# Patient Record
Sex: FEMALE | Race: BLACK OR AFRICAN AMERICAN | NOT HISPANIC OR LATINO | Employment: OTHER | URBAN - METROPOLITAN AREA
[De-identification: names, ages, dates, MRNs, and addresses within clinical notes are randomized per-mention and may not be internally consistent; named-entity substitution may affect disease eponyms.]

---

## 2017-05-04 ENCOUNTER — HISTORICAL (OUTPATIENT)
Dept: ADMINISTRATIVE | Facility: HOSPITAL | Age: 62
End: 2017-05-04

## 2017-05-04 LAB
ALBUMIN SERPL-MCNC: 3.5 GM/DL (ref 3.4–5)
ALBUMIN/GLOB SERPL: 1 {RATIO}
ALP SERPL-CCNC: 253 UNIT/L (ref 38–126)
ALT SERPL-CCNC: 489 UNIT/L (ref 12–78)
AST SERPL-CCNC: 189 UNIT/L (ref 15–37)
BILIRUB SERPL-MCNC: 0.7 MG/DL (ref 0.2–1)
BILIRUBIN DIRECT+TOT PNL SERPL-MCNC: 0.2 MG/DL (ref 0–0.2)
BILIRUBIN DIRECT+TOT PNL SERPL-MCNC: 0.5 MG/DL (ref 0–0.8)
BUN SERPL-MCNC: 18 MG/DL (ref 7–18)
CALCIUM SERPL-MCNC: 8.5 MG/DL (ref 8.5–10.1)
CHLORIDE SERPL-SCNC: 108 MMOL/L (ref 98–107)
CO2 SERPL-SCNC: 29 MMOL/L (ref 21–32)
CREAT SERPL-MCNC: 0.85 MG/DL (ref 0.55–1.02)
GLOBULIN SER-MCNC: 3.4 GM/DL (ref 2.4–3.5)
GLUCOSE SERPL-MCNC: 124 MG/DL (ref 74–106)
INR PPP: 1.01 (ref 0–1.27)
POTASSIUM SERPL-SCNC: 4.1 MMOL/L (ref 3.5–5.1)
PROT SERPL-MCNC: 6.9 GM/DL (ref 6.4–8.2)
PROTHROMBIN TIME: 13.1 SECOND(S) (ref 12.1–14.2)
SODIUM SERPL-SCNC: 145 MMOL/L (ref 136–145)

## 2017-09-25 ENCOUNTER — HISTORICAL (OUTPATIENT)
Dept: BARIATRICS | Facility: HOSPITAL | Age: 62
End: 2017-09-25

## 2018-06-21 ENCOUNTER — HISTORICAL (OUTPATIENT)
Dept: INTENSIVE CARE | Facility: HOSPITAL | Age: 63
End: 2018-06-21

## 2020-09-23 LAB — CRC RECOMMENDATION EXT: NORMAL

## 2021-02-23 LAB — BCS RECOMMENDATION EXT: NORMAL

## 2021-04-14 ENCOUNTER — HISTORICAL (OUTPATIENT)
Dept: ADMINISTRATIVE | Facility: HOSPITAL | Age: 66
End: 2021-04-14

## 2021-04-14 LAB
ABS NEUT (OLG): 3 X10(3)/MCL (ref 2.1–9.2)
ALBUMIN SERPL-MCNC: 4.2 GM/DL (ref 3.4–4.8)
ALBUMIN/GLOB SERPL: 1.4 RATIO (ref 1.1–2)
ALP SERPL-CCNC: 142 UNIT/L (ref 40–150)
ALT SERPL-CCNC: 199 UNIT/L (ref 0–55)
APPEARANCE, UA: CLEAR
AST SERPL-CCNC: 219 UNIT/L (ref 5–34)
BACTERIA SPEC CULT: ABNORMAL /HPF
BASOPHILS # BLD AUTO: 0 X10(3)/MCL (ref 0–0.2)
BASOPHILS NFR BLD AUTO: 0 %
BILIRUB SERPL-MCNC: 0.8 MG/DL
BILIRUB UR QL STRIP: NEGATIVE
BILIRUBIN DIRECT+TOT PNL SERPL-MCNC: 0.4 MG/DL (ref 0–0.5)
BILIRUBIN DIRECT+TOT PNL SERPL-MCNC: 0.4 MG/DL (ref 0–0.8)
BUN SERPL-MCNC: 17.3 MG/DL (ref 9.8–20.1)
CALCIUM SERPL-MCNC: 9.7 MG/DL (ref 8.4–10.2)
CHLORIDE SERPL-SCNC: 106 MMOL/L (ref 98–107)
CHOLEST SERPL-MCNC: 202 MG/DL
CHOLEST/HDLC SERPL: 2 {RATIO} (ref 0–5)
CK SERPL-CCNC: 152 U/L (ref 29–168)
CO2 SERPL-SCNC: 32 MMOL/L (ref 23–31)
COLOR UR: ABNORMAL
CREAT SERPL-MCNC: 0.97 MG/DL (ref 0.55–1.02)
EOSINOPHIL # BLD AUTO: 0.1 X10(3)/MCL (ref 0–0.9)
EOSINOPHIL NFR BLD AUTO: 2 %
ERYTHROCYTE [DISTWIDTH] IN BLOOD BY AUTOMATED COUNT: 15.1 % (ref 11.5–17)
EST. AVERAGE GLUCOSE BLD GHB EST-MCNC: 116.9 MG/DL
GLOBULIN SER-MCNC: 3.1 GM/DL (ref 2.4–3.5)
GLUCOSE (UA): NEGATIVE
GLUCOSE SERPL-MCNC: 101 MG/DL (ref 82–115)
HAV IGM SERPL QL IA: NONREACTIVE
HBA1C MFR BLD: 5.7 %
HBV CORE IGM SERPL QL IA: REACTIVE
HBV SURFACE AG SERPL QL IA: NONREACTIVE
HCT VFR BLD AUTO: 45.5 % (ref 37–47)
HCV AB SERPL QL IA: NONREACTIVE
HDLC SERPL-MCNC: 96 MG/DL (ref 35–60)
HEPATITIS PANEL INTERP: ABNORMAL
HGB BLD-MCNC: 14.3 GM/DL (ref 12–16)
HGB UR QL STRIP: NEGATIVE
KETONES UR QL STRIP: ABNORMAL
LDLC SERPL CALC-MCNC: 96 MG/DL (ref 50–140)
LEUKOCYTE ESTERASE UR QL STRIP: NEGATIVE
LYMPHOCYTES # BLD AUTO: 2.8 X10(3)/MCL (ref 0.6–4.6)
LYMPHOCYTES NFR BLD AUTO: 43 %
MCH RBC QN AUTO: 29.2 PG (ref 27–31)
MCHC RBC AUTO-ENTMCNC: 31.4 GM/DL (ref 33–36)
MCV RBC AUTO: 93 FL (ref 80–94)
MONOCYTES # BLD AUTO: 0.6 X10(3)/MCL (ref 0.1–1.3)
MONOCYTES NFR BLD AUTO: 9 %
NEUTROPHILS # BLD AUTO: 3 X10(3)/MCL (ref 2.1–9.2)
NEUTROPHILS NFR BLD AUTO: 46 %
NITRITE UR QL STRIP: NEGATIVE
PH UR STRIP: 5.5 [PH] (ref 5–9)
PLATELET # BLD AUTO: 249 X10(3)/MCL (ref 130–400)
PMV BLD AUTO: 10.1 FL (ref 9.4–12.4)
POTASSIUM SERPL-SCNC: 4.5 MMOL/L (ref 3.5–5.1)
PROT SERPL-MCNC: 7.3 GM/DL (ref 5.8–7.6)
PROT UR QL STRIP: NEGATIVE
RBC # BLD AUTO: 4.89 X10(6)/MCL (ref 4.2–5.4)
RBC #/AREA URNS HPF: ABNORMAL /[HPF]
SODIUM SERPL-SCNC: 145 MMOL/L (ref 136–145)
SP GR UR STRIP: 1.03 (ref 1–1.03)
SQUAMOUS EPITHELIAL, UA: ABNORMAL /HPF (ref 0–4)
TRIGL SERPL-MCNC: 52 MG/DL (ref 37–140)
TSH SERPL-ACNC: 1.03 UIU/ML (ref 0.35–4.94)
UROBILINOGEN UR STRIP-ACNC: 1
VLDLC SERPL CALC-MCNC: 10 MG/DL
WBC # SPEC AUTO: 6.5 X10(3)/MCL (ref 4.5–11.5)
WBC #/AREA URNS HPF: ABNORMAL /[HPF]

## 2021-04-21 ENCOUNTER — HISTORICAL (OUTPATIENT)
Dept: ADMINISTRATIVE | Facility: HOSPITAL | Age: 66
End: 2021-04-21

## 2021-04-30 ENCOUNTER — HISTORICAL (OUTPATIENT)
Dept: RADIOLOGY | Facility: HOSPITAL | Age: 66
End: 2021-04-30

## 2021-05-26 ENCOUNTER — NURSE TRIAGE (OUTPATIENT)
Dept: ADMINISTRATIVE | Facility: CLINIC | Age: 66
End: 2021-05-26

## 2021-06-02 ENCOUNTER — HISTORICAL (OUTPATIENT)
Dept: ADMINISTRATIVE | Facility: HOSPITAL | Age: 66
End: 2021-06-02

## 2021-06-02 LAB — SARS-COV-2 RNA RESP QL NAA+PROBE: NOT DETECTED

## 2022-04-09 ENCOUNTER — HISTORICAL (OUTPATIENT)
Dept: ADMINISTRATIVE | Facility: HOSPITAL | Age: 67
End: 2022-04-09

## 2022-04-25 VITALS
HEIGHT: 61 IN | BODY MASS INDEX: 39.63 KG/M2 | DIASTOLIC BLOOD PRESSURE: 76 MMHG | SYSTOLIC BLOOD PRESSURE: 128 MMHG | WEIGHT: 209.88 LBS | OXYGEN SATURATION: 96 %

## 2022-06-02 ENCOUNTER — TELEPHONE (OUTPATIENT)
Dept: FAMILY MEDICINE | Facility: CLINIC | Age: 67
End: 2022-06-02
Payer: MEDICARE

## 2022-06-02 NOTE — TELEPHONE ENCOUNTER
----- Message from Izzy Lu Patient Care Assistant sent at 6/2/2022 12:38 PM CDT -----  Regarding: med refill  Type:  RX Refill Request    Who Called: pt  Refill or New Rx: refill  RX Name and Strength: weight control pill not adapex it came in two strengths a 40 - 60 pt thinks  How is the patient currently taking it? (ex. 1XDay): 1 daily  Is this a 30 day or 90 day RX: 30  Preferred Pharmacy with phone number: [facility] or Mail Order: Local  Ordering Provider: Dr. Ulrich  Would the patient rather a call back or a response via [facility]? C/b  Best Call Back Number: [phone]  Additional Information: pt req refill on her weight control pill please call pt back to discuss

## 2022-06-13 ENCOUNTER — PATIENT MESSAGE (OUTPATIENT)
Dept: FAMILY MEDICINE | Facility: CLINIC | Age: 67
End: 2022-06-13

## 2022-06-23 DIAGNOSIS — F41.9 ANXIETY DISORDER, UNSPECIFIED: ICD-10-CM

## 2022-06-23 RX ORDER — BUSPIRONE HYDROCHLORIDE 10 MG/1
10 TABLET ORAL 2 TIMES DAILY
Qty: 180 TABLET | Refills: 1 | Status: SHIPPED | OUTPATIENT
Start: 2022-06-23 | End: 2022-06-28 | Stop reason: SDUPTHER

## 2022-06-23 RX ORDER — LEVOCETIRIZINE DIHYDROCHLORIDE 5 MG/1
5 TABLET, FILM COATED ORAL DAILY
Qty: 30 TABLET | Refills: 3 | Status: SHIPPED | OUTPATIENT
Start: 2022-06-23 | End: 2022-06-28 | Stop reason: SDUPTHER

## 2022-06-23 RX ORDER — ASPIRIN 325 MG
50000 TABLET, DELAYED RELEASE (ENTERIC COATED) ORAL
Qty: 4 CAPSULE | Refills: 3 | Status: SHIPPED | OUTPATIENT
Start: 2022-06-23 | End: 2022-06-28 | Stop reason: SDUPTHER

## 2022-06-28 DIAGNOSIS — F41.9 ANXIETY DISORDER, UNSPECIFIED: ICD-10-CM

## 2022-06-28 RX ORDER — LEVOCETIRIZINE DIHYDROCHLORIDE 5 MG/1
5 TABLET, FILM COATED ORAL DAILY
Qty: 90 TABLET | Refills: 3 | Status: SHIPPED | OUTPATIENT
Start: 2022-06-28 | End: 2022-10-10 | Stop reason: SDUPTHER

## 2022-06-28 RX ORDER — ASPIRIN 325 MG
50000 TABLET, DELAYED RELEASE (ENTERIC COATED) ORAL
Qty: 4 CAPSULE | Refills: 3 | Status: SHIPPED | OUTPATIENT
Start: 2022-06-28 | End: 2023-07-12 | Stop reason: SDUPTHER

## 2022-06-28 RX ORDER — BUSPIRONE HYDROCHLORIDE 10 MG/1
10 TABLET ORAL 2 TIMES DAILY
Qty: 180 TABLET | Refills: 1 | Status: SHIPPED | OUTPATIENT
Start: 2022-06-28 | End: 2022-07-05

## 2022-10-10 RX ORDER — LEVOCETIRIZINE DIHYDROCHLORIDE 5 MG/1
5 TABLET, FILM COATED ORAL DAILY
Qty: 90 TABLET | Refills: 3 | Status: SHIPPED | OUTPATIENT
Start: 2022-10-10 | End: 2023-06-22 | Stop reason: SDUPTHER

## 2022-10-26 ENCOUNTER — OFFICE VISIT (OUTPATIENT)
Dept: FAMILY MEDICINE | Facility: CLINIC | Age: 67
End: 2022-10-26
Payer: COMMERCIAL

## 2022-10-26 VITALS
DIASTOLIC BLOOD PRESSURE: 73 MMHG | OXYGEN SATURATION: 96 % | HEART RATE: 67 BPM | WEIGHT: 203.69 LBS | BODY MASS INDEX: 38.46 KG/M2 | TEMPERATURE: 98 F | HEIGHT: 61 IN | SYSTOLIC BLOOD PRESSURE: 117 MMHG | RESPIRATION RATE: 16 BRPM

## 2022-10-26 DIAGNOSIS — Z01.818 ENCOUNTER FOR OTHER PREPROCEDURAL EXAMINATION: Primary | ICD-10-CM

## 2022-10-26 PROCEDURE — 99213 OFFICE O/P EST LOW 20 MIN: CPT | Mod: ,,, | Performed by: PHYSICIAN ASSISTANT

## 2022-10-26 PROCEDURE — 99213 PR OFFICE/OUTPT VISIT, EST, LEVL III, 20-29 MIN: ICD-10-PCS | Mod: ,,, | Performed by: PHYSICIAN ASSISTANT

## 2022-10-26 RX ORDER — LIRAGLUTIDE 6 MG/ML
3 INJECTION, SOLUTION SUBCUTANEOUS
COMMUNITY
End: 2022-12-08

## 2022-10-26 NOTE — PROGRESS NOTES
SUBJECTIVE:     History of Present Illness      Chief Complaint: Surgery Clearance    HPI:  Patient is a 67 y.o. year old female who presents to clinic for surgical clearance. Patient will be having Cataracts surgery on 11/8/2022.  Patient has past medical history of generalized anxiety disorder, binge eating disorder, controlled hypertension, obesity,and insomnia.     Review of Systems:  General: Denies fever, chills, fatigue, myalgias, and change in appetite   Eyes: Denies eye redness, eye drainage, eye pain  ENT: Denies ear pain or pressure, rhinorrhea, nasal congestion, sore throat, and trouble swallowing  Resp: Denies wheezing, and shortness of breath   Cardio: Denies chest pain, palpitations, pleuritic pain, and edema   GI: Denies nausea, vomiting, diarrhea, and abdominal pain   : Denies dysuria, hematuria, and discharge   MSK: Denies trauma, joint pain, and trouble ambulating   Neuro: Denies LOC, dizziness, seizure like activity, and focal deficits   Skin: Denies rashes, open lesions and ulcers      Previous History      Review of patient's allergies indicates:  No Known Allergies    Past Medical History:   Diagnosis Date    Binge eating disorder     Generalized anxiety disorder     Hypertensive disorder     Insomnia     Obesity, unspecified     BENIGNO (obstructive sleep apnea)      Current Outpatient Medications   Medication Instructions    atorvastatin (LIPITOR) 20 mg, Oral, Daily    busPIRone (BUSPAR) 10 MG tablet TAKE 1 TABLET BY MOUTH TWICE A DAY AS NEEDED FOR ANXIETY    cholecalciferol (vitamin D3) 50,000 Units, Oral, Every 7 days    levocetirizine (XYZAL) 5 mg, Oral, Daily    liraglutide, weight loss, (SAXENDA) 3 mg/0.5 mL (18 mg/3 mL) PnIj 3 mLs, Subcutaneous    losartan-hydrochlorothiazide 100-25 mg (HYZAAR) 100-25 mg per tablet 1 tablet, Oral, Daily    meloxicam (MOBIC) 15 mg, Oral, Daily    suvorexant (BELSOMRA) 20 mg, Oral, Daily     Past Surgical History:   Procedure Laterality Date    BACK  "SURGERY      CHOLECYSTECTOMY      KNEE ARTHROSCOPY      LUMBAR FUSION      NECK SURGERY       Family History   Problem Relation Age of Onset    Hypertension Mother     Diabetes Mother     Coronary artery disease Mother     Coronary artery disease Brother      Social History     Tobacco Use    Smoking status: Never    Smokeless tobacco: Never   Substance Use Topics    Alcohol use: Never    Drug use: Never      Health Maintenance      Health Maintenance   Topic Date Due    TETANUS VACCINE  Never done    DEXA Scan  Never done    Mammogram  07/09/2016    Lipid Panel  04/14/2026    Hepatitis C Screening  Completed     OBJECTIVE:     Physical Exam      Vital Signs Reviewed   /73 (BP Location: Right arm, Patient Position: Sitting, BP Method: Large (Automatic))   Pulse 67   Temp 98 °F (36.7 °C) (Oral)   Resp 16   Ht 5' 1" (1.549 m)   Wt 92.4 kg (203 lb 11.2 oz)   SpO2 96%   BMI 38.49 kg/m²     Physical Exam:  General: Alert, well nourished, no acute distress, non-toxic appearing.   Eyes: Anicteric sclera, without conjunctival injection, normal lids, no purulent drainage, EOMs fully intact.   Ears: No tragal tenderness. Tympanic membranes intact, pearly grey, without effusion or erythema and with a positive light reflex.   Mouth: Posterior pharynx without erythema. No exudate, ulcerations, or lesion. No tonsillar swelling.   Neck: Supple, full ROM, no rigidity, no cervical adenopathy. Np carotid artery bruits.   Cardio: Normal rate and rhythm without murmurs, gallops, or rubs. No LE edema.   Resp: Respirations even and unlabored, clear to auscultation bilaterally.   Abd: No ecchymosis or distension. Normal bowel sounds in all 4 quadrants. No tenderness to palpation. No rebound tenderness or guarding. No CVA tenderness.   Skin: No rashes or open lesions noted.   MSK: No swelling. No abrasions or signs of trauma. Ambulating without assistance.   Neuro: CN1-12 intact grossly. No focal deficits noted. Facial " expressions even.      Assessment/Plan      1. Encounter for other preprocedural examination   Presents for surgical clearance. Will be having cataracts surgery on 11/8/2022 at [facility].   Compliant with medication. HTN well controlled.   No other cardiac history.   Patient has not had labs performed within the past year.   Will obtain basic labs CBC and CMP.   Patient will be cleared for surgery pending lab results.   Will fax over surgical clearance form if labs look good.   Follow up as planned for Wellness in December with Dr. Ulrich.        Orders Placed This Encounter    CBC Auto Differential    Comprehensive Metabolic Panel      Medication List with Changes/Refills   Current Medications    ATORVASTATIN (LIPITOR) 20 MG TABLET    Take 20 mg by mouth Daily.    BUSPIRONE (BUSPAR) 10 MG TABLET    TAKE 1 TABLET BY MOUTH TWICE A DAY AS NEEDED FOR ANXIETY    CHOLECALCIFEROL, VITAMIN D3, 1,250 MCG (50,000 UNIT) CAPSULE    Take 1 capsule (50,000 Units total) by mouth every 7 days.    LEVOCETIRIZINE (XYZAL) 5 MG TABLET    Take 1 tablet (5 mg total) by mouth Daily.    LIRAGLUTIDE, WEIGHT LOSS, (SAXENDA) 3 MG/0.5 ML (18 MG/3 ML) PNIJ    Inject 3 mLs into the skin.    LOSARTAN-HYDROCHLOROTHIAZIDE 100-25 MG (HYZAAR) 100-25 MG PER TABLET    Take 1 tablet by mouth Daily.    MELOXICAM (MOBIC) 15 MG TABLET    Take 15 mg by mouth Daily.    SUVOREXANT (BELSOMRA) 20 MG TAB    Take 20 mg by mouth Daily.     Follow up As planned with Dr. Ulrich 12/8/2022..    Rayna Riddle PA-C    Future Appointments   Date Time Provider Department Center   12/8/2022  1:30 PM Marianela Ulrich MD [facility]

## 2022-10-26 NOTE — PATIENT INSTRUCTIONS
Please get labs done. Once labs completed will call with results and [facility]     Follow up as planned in December with Dr. Ulrich. Please have labs done proir.

## 2022-11-02 ENCOUNTER — TELEPHONE (OUTPATIENT)
Dept: FAMILY MEDICINE | Facility: CLINIC | Age: 67
End: 2022-11-02

## 2022-11-02 ENCOUNTER — OFFICE VISIT (OUTPATIENT)
Dept: FAMILY MEDICINE | Facility: CLINIC | Age: 67
End: 2022-11-02
Payer: COMMERCIAL

## 2022-11-02 VITALS
TEMPERATURE: 98 F | OXYGEN SATURATION: 97 % | SYSTOLIC BLOOD PRESSURE: 124 MMHG | BODY MASS INDEX: 38.54 KG/M2 | WEIGHT: 204.13 LBS | HEART RATE: 66 BPM | RESPIRATION RATE: 17 BRPM | HEIGHT: 61 IN | DIASTOLIC BLOOD PRESSURE: 81 MMHG

## 2022-11-02 DIAGNOSIS — M76.62 ACHILLES TENDINITIS OF BOTH LOWER EXTREMITIES: Primary | ICD-10-CM

## 2022-11-02 DIAGNOSIS — H26.9 CATARACT OF BOTH EYES, UNSPECIFIED CATARACT TYPE: ICD-10-CM

## 2022-11-02 DIAGNOSIS — E66.9 OBESITY (BMI 35.0-39.9 WITHOUT COMORBIDITY): ICD-10-CM

## 2022-11-02 DIAGNOSIS — M76.61 ACHILLES TENDINITIS OF BOTH LOWER EXTREMITIES: Primary | ICD-10-CM

## 2022-11-02 PROCEDURE — 99214 PR OFFICE/OUTPT VISIT, EST, LEVL IV, 30-39 MIN: ICD-10-PCS | Mod: ,,, | Performed by: PHYSICIAN ASSISTANT

## 2022-11-02 PROCEDURE — 99214 OFFICE O/P EST MOD 30 MIN: CPT | Mod: ,,, | Performed by: PHYSICIAN ASSISTANT

## 2022-11-02 RX ORDER — LIRAGLUTIDE 6 MG/ML
INJECTION, SOLUTION SUBCUTANEOUS
Qty: 1 EACH | Refills: 3 | Status: SHIPPED | OUTPATIENT
Start: 2022-11-02 | End: 2022-12-08

## 2022-11-02 NOTE — TELEPHONE ENCOUNTER
----- Message from Robin Cox sent at 11/2/2022  1:28 PM CDT -----  Regarding: call back  .Type:  Needs Medical Advice    Who Called: galileo  Would the patient rather a call back or a response via [email or web address]? lawrence  Best Call Back Number: [phone]  Additional Information: Pt is requesting a call back from the nurse.

## 2022-11-02 NOTE — PROGRESS NOTES
SUBJECTIVE:     History of Present Illness      Chief Complaint: Ankle Pain (Both)    HPI:  Patient is a 67 year old female with complaints of bilateral pain. Patient has past medical history of generalized anxiety disorder, binge eating disorder, controlled hypertension, obesity, and insomnia.      Ankle pain has been present for months. Denies particular injury. Wearing certain shoes will make symptoms worse. Pain is present in the achilles tendon region bilaterally. Symptoms worse when she starts walking and then get better after a few steps. Symptoms seem worse when she wakes up in the morning. Denies joint swelling, other joint pain, numbness, tingling, and weakness of the feet.     Patient used to be on Saxenda for obesity. Last prescribed 1/2022. Patient states she stopped the medication and just recently restarted it. Asks for new prescription. Reports mild nausea with the medication. Denies abdominal pain.     Patient will be having Cataracts surgery on 11/8/2022.     Review of Systems:  A comprehensive review of systems was performed and was negative except as noted in HPI     Previous History      Review of patient's allergies indicates:  No Known Allergies    Past Medical History:   Diagnosis Date    Binge eating disorder     Generalized anxiety disorder     Hypertensive disorder     Insomnia     Obesity, unspecified     BENIGNO (obstructive sleep apnea)      Current Outpatient Medications   Medication Instructions    atorvastatin (LIPITOR) 20 mg, Oral, Daily    busPIRone (BUSPAR) 10 MG tablet TAKE 1 TABLET BY MOUTH TWICE A DAY AS NEEDED FOR ANXIETY    cholecalciferol (vitamin D3) 50,000 Units, Oral, Every 7 days    levocetirizine (XYZAL) 5 mg, Oral, Daily    liraglutide, weight loss, (SAXENDA) 3 mg/0.5 mL (18 mg/3 mL) PnIj 3 mLs, Subcutaneous    liraglutide, weight loss, (SAXENDA) 3 mg/0.5 mL (18 mg/3 mL) PnIj Start 0.6 mg per day for one week; then increase dose by 0.6 mg in weekly intervals until a max  "dose of 3 mg is reached.    losartan-hydrochlorothiazide 100-25 mg (HYZAAR) 100-25 mg per tablet 1 tablet, Oral, Daily    suvorexant (BELSOMRA) 20 mg, Oral, Daily     Past Surgical History:   Procedure Laterality Date    BACK SURGERY      CHOLECYSTECTOMY      KNEE ARTHROSCOPY      LUMBAR FUSION      NECK SURGERY       Family History   Problem Relation Age of Onset    Hypertension Mother     Diabetes Mother     Coronary artery disease Mother     Coronary artery disease Brother      Social History     Tobacco Use    Smoking status: Former     Types: Cigarettes     Passive exposure: Past    Smokeless tobacco: Never   Substance Use Topics    Alcohol use: Never    Drug use: Never      Health Maintenance      Health Maintenance   Topic Date Due    TETANUS VACCINE  Never done    DEXA Scan  Never done    Mammogram  07/09/2016    Lipid Panel  04/14/2026    Hepatitis C Screening  Completed       OBJECTIVE:     Physical Exam      Vital Signs Reviewed   /81 (BP Location: Right arm, Patient Position: Sitting, BP Method: Large (Automatic))   Pulse 66   Temp 97.8 °F (36.6 °C) (Oral)   Resp 17   Ht 5' 1" (1.549 m)   Wt 92.6 kg (204 lb 1.6 oz)   SpO2 97%   BMI 38.56 kg/m²     Physical Exam:  General: Alert, well nourished, no acute distress, non-toxic appearing.   Eyes: Anicteric sclera, without conjunctival injection, normal lids, no purulent drainage, EOMs grossly intact.   Skin: No rashes or open lesions noted.   MSK: No swelling. No abrasions or signs of trauma. Ambulating without assistance. Tenderness to palpation at the insertion of the achilles tendon. No bony tenderness of the foot/ankle. Full ROM of the ankle. Full strength upon plantar and dorsiflexion of the feet bilaterally. Dorsalis pedis pulses full. No swelling of the foot or ankle. Cap refill immediate.   Neuro: CN1-12 intact grossly. No focal deficits noted. Facial expressions even.      Assessment/Plan      1. Achilles tendinitis of both lower " extremities   Tender to palpation at the insertion of achilles tendon on exam.   Discussed RICE therapy.   Recommended OTC antiinflammatories such as ibuprofen for pain relief.   Discussed steroids with patient although she would like to hold off as she just recently received steroid injection in her back.   Will do trial of PT and NSAID. If symptoms do not resolve will consider steroids and ortho referral.      2. Cataract of both eyes, unspecified cataract type   CBC and CMP reviewed and within normal limits.   Surgery clearance form filled out and faxed to [facility].   Patient to have cataract surgery 11/8/2022.      3. Obesity (BMI 35.0-39.9 without comorbidity)   Restart liraglutide (SAXENDA) 3 mg/0.5 mL (18 mg/3 mL) PnIj  New prescription sent.  Discussed with patient in detail on how she should take the medication starting at 0.6 mg daily and titrating up to 3 mg as tolerated. Discussed if she has severe nausea of vomiting she should call the office for further instruction.        Orders Placed This Encounter    Ambulatory referral/consult to Physical/Occupational Therapy    liraglutide, weight loss, (SAXENDA) 3 mg/0.5 mL (18 mg/3 mL) PnIj      Medication List with Changes/Refills   New Medications    LIRAGLUTIDE, WEIGHT LOSS, (SAXENDA) 3 MG/0.5 ML (18 MG/3 ML) PNIJ    Start 0.6 mg per day for one week; then increase dose by 0.6 mg in weekly intervals until a max dose of 3 mg is reached.   Current Medications    ATORVASTATIN (LIPITOR) 20 MG TABLET    Take 20 mg by mouth Daily.    BUSPIRONE (BUSPAR) 10 MG TABLET    TAKE 1 TABLET BY MOUTH TWICE A DAY AS NEEDED FOR ANXIETY    CHOLECALCIFEROL, VITAMIN D3, 1,250 MCG (50,000 UNIT) CAPSULE    Take 1 capsule (50,000 Units total) by mouth every 7 days.    LEVOCETIRIZINE (XYZAL) 5 MG TABLET    Take 1 tablet (5 mg total) by mouth Daily.    LIRAGLUTIDE, WEIGHT LOSS, (SAXENDA) 3 MG/0.5 ML (18 MG/3 ML) PNIJ    Inject 3 mLs into the skin.     LOSARTAN-HYDROCHLOROTHIAZIDE 100-25 MG (HYZAAR) 100-25 MG PER TABLET    Take 1 tablet by mouth Daily.    SUVOREXANT (BELSOMRA) 20 MG TAB    Take 20 mg by mouth Daily.         Follow up As planned with Dr. Ulrich.    Rayna Riddle PA-C    Future Appointments   Date Time Provider Department Center   12/8/2022  1:30 PM Marianela Ulrich MD [facility]      Addendum 11/14/2022  Patient cataracts surgery rescheduled for 11/29/2022 patient cleared for surgery.

## 2022-11-03 ENCOUNTER — DOCUMENTATION ONLY (OUTPATIENT)
Dept: INTERNAL MEDICINE | Facility: CLINIC | Age: 67
End: 2022-11-03
Payer: COMMERCIAL

## 2022-11-14 ENCOUNTER — TELEPHONE (OUTPATIENT)
Dept: FAMILY MEDICINE | Facility: CLINIC | Age: 67
End: 2022-11-14
Payer: COMMERCIAL

## 2022-11-14 NOTE — TELEPHONE ENCOUNTER
We can extend surgical clearance until then. Let me know if there is anything I need to do further.

## 2022-11-14 NOTE — TELEPHONE ENCOUNTER
----- Message from Lorraine Rodriguez MA sent at 11/11/2022 10:16 AM CST -----    ----- Message -----  From: Dario Laguerre  Sent: 11/10/2022   3:09 PM CST  To: Moj[facility] NICKERSON Staff    .Type:  Needs Medical Advice    Who Called: pt  Would the patient rather a call back or a response via [facility]? Call back  Best Call Back Number: [phone]  Additional Information: pt is calling to see about extending her surgical clearance for an extra week due to having to reschedule her surgery pt surgery got moved to the 29th

## 2022-11-15 ENCOUNTER — TELEPHONE (OUTPATIENT)
Dept: FAMILY MEDICINE | Facility: CLINIC | Age: 67
End: 2022-11-15
Payer: COMMERCIAL

## 2022-11-15 NOTE — TELEPHONE ENCOUNTER
----- Message from FIDE Sanchez sent at 11/14/2022  3:41 PM CST -----  Addendum made.    ----- Message -----  From: Madelaine Regan  Sent: 11/14/2022  10:18 AM CST  To: FIDE Sanchez    Perfect can a addemdum be made to the note stating this? I can fax this over to [facility] for the pt. once complete. Thank you.

## 2022-12-08 ENCOUNTER — OFFICE VISIT (OUTPATIENT)
Dept: FAMILY MEDICINE | Facility: CLINIC | Age: 67
End: 2022-12-08
Payer: COMMERCIAL

## 2022-12-08 VITALS
RESPIRATION RATE: 18 BRPM | SYSTOLIC BLOOD PRESSURE: 139 MMHG | HEART RATE: 73 BPM | BODY MASS INDEX: 37.82 KG/M2 | OXYGEN SATURATION: 95 % | WEIGHT: 200.31 LBS | DIASTOLIC BLOOD PRESSURE: 82 MMHG | TEMPERATURE: 98 F | HEIGHT: 61 IN

## 2022-12-08 DIAGNOSIS — Z78.0 POSTMENOPAUSAL: ICD-10-CM

## 2022-12-08 DIAGNOSIS — M76.62 ACHILLES TENDINITIS OF BOTH LOWER EXTREMITIES: Primary | ICD-10-CM

## 2022-12-08 DIAGNOSIS — E66.01 CLASS 2 SEVERE OBESITY DUE TO EXCESS CALORIES WITH SERIOUS COMORBIDITY AND BODY MASS INDEX (BMI) OF 37.0 TO 37.9 IN ADULT: ICD-10-CM

## 2022-12-08 DIAGNOSIS — Z12.31 VISIT FOR SCREENING MAMMOGRAM: ICD-10-CM

## 2022-12-08 DIAGNOSIS — Z13.820 SCREENING FOR OSTEOPOROSIS: ICD-10-CM

## 2022-12-08 DIAGNOSIS — M76.61 ACHILLES TENDINITIS OF BOTH LOWER EXTREMITIES: Primary | ICD-10-CM

## 2022-12-08 PROCEDURE — 99214 OFFICE O/P EST MOD 30 MIN: CPT | Mod: ,,, | Performed by: FAMILY MEDICINE

## 2022-12-08 PROCEDURE — 99214 PR OFFICE/OUTPT VISIT, EST, LEVL IV, 30-39 MIN: ICD-10-PCS | Mod: ,,, | Performed by: FAMILY MEDICINE

## 2022-12-08 RX ORDER — DILTIAZEM HYDROCHLORIDE 240 MG/1
240 CAPSULE, EXTENDED RELEASE ORAL
COMMUNITY
Start: 2022-11-21 | End: 2023-10-09 | Stop reason: SDUPTHER

## 2022-12-08 NOTE — PROGRESS NOTES
Patient ID: [id number]     Chief Complaint: Annual Exam        HPI:     Wen Otoole is a 67 y.o. female here today for a Medicare Wellness. No other complaints today.         A separate E/M code has been provided to evaluate additional complaints that the patient would like addressed during the dedicated Medicare Wellness Exam.        ----------------------------  Binge eating disorder  Generalized anxiety disorder  Hypertensive disorder  Insomnia  Obesity, unspecified  BENIGNO (obstructive sleep apnea)  Personal history of colonic polyps     Past Surgical History:   Procedure Laterality Date    BACK SURGERY      CHOLECYSTECTOMY      COLONOSCOPY  09/23/2020    KNEE ARTHROSCOPY      LUMBAR FUSION      NECK SURGERY         Review of patient's allergies indicates:  No Known Allergies    Outpatient Medications Marked as Taking for the 12/8/22 encounter (Office Visit) with Marianela Ulrich MD   Medication Sig Dispense Refill    atorvastatin (LIPITOR) 20 MG tablet Take 20 mg by mouth Daily.      busPIRone (BUSPAR) 10 MG tablet TAKE 1 TABLET BY MOUTH TWICE A DAY AS NEEDED FOR ANXIETY 180 tablet 1    cholecalciferol, vitamin D3, 1,250 mcg (50,000 unit) capsule Take 1 capsule (50,000 Units total) by mouth every 7 days. 4 capsule 3    diltiaZEM (TIAZAC) 240 MG Cs24 Take 240 mg by mouth.      levocetirizine (XYZAL) 5 MG tablet Take 1 tablet (5 mg total) by mouth Daily. 90 tablet 3    liraglutide, weight loss, (SAXENDA) 3 mg/0.5 mL (18 mg/3 mL) PnIj Inject 3 mLs into the skin.      liraglutide, weight loss, (SAXENDA) 3 mg/0.5 mL (18 mg/3 mL) PnIj Start 0.6 mg per day for one week; then increase dose by 0.6 mg in weekly intervals until a max dose of 3 mg is reached. 1 each 3    losartan-hydrochlorothiazide 100-25 mg (HYZAAR) 100-25 mg per tablet Take 1 tablet by mouth Daily.      suvorexant (BELSOMRA) 20 mg Tab Take 20 mg by mouth Daily.         Social History     Socioeconomic History    Marital status: Single   Tobacco  "Use    Smoking status: Former     Types: Cigarettes     Passive exposure: Past    Smokeless tobacco: Never   Substance and Sexual Activity    Alcohol use: Never    Drug use: Never    Sexual activity: Not Currently     Partners: Female     Birth control/protection: None        Family History   Problem Relation Age of Onset    Hypertension Mother     Diabetes Mother     Coronary artery disease Mother     Coronary artery disease Brother         Subjective:     ROS      See HPI for details    Constitutional: Denies Change in appetite. Denies Chills. Denies Fever. Denies Night sweats.  Eye: Denies Blurred vision. Denies Discharge. Denies Eye pain.  ENT: Denies Decreased hearing. Denies Sore throat. Denies Swollen glands.  Respiratory: Denies Cough. Denies Shortness of breath. Denies Shortness of breath with exertion. Denies Wheezing.  Cardiovascular: Denies Chest pain at rest. Denies Chest pain with exertion. Denies Irregular heartbeat. Denies Palpitations.  Gastrointestinal: Denies Abdominal pain. Denies Diarrhea. Denies Nausea. Denies Vomiting. Denies Hematemesis or Hematochezia.  Genitourinary: Denies Dysuria. Denies Urinary frequency. Denies Urinary urgency. Denies Blood in urine.  Endocrine: Denies Cold intolerance. Denies Excessive thirst. Denies Heat intolerance. Denies Weight loss. Denies Weight gain.  Musculoskeletal: Denies Painful joints. Denies Weakness.  Integumentary: Denies Rash. Denies Itching. Denies Dry skin.  Neurologic: Denies Dizziness. Denies Fainting. Denies Headache.  Psychiatric: Denies Depression. Denies Anxiety. Denies Suicidal/Homicidal ideations.    All Other ROS: Negative except as stated in HPI.       Objective:     /82 (BP Location: Left arm, Patient Position: Sitting, BP Method: Medium (Automatic))   Pulse 73   Temp 97.7 °F (36.5 °C)   Resp 18   Ht 5' 1" (1.549 m)   Wt 90.9 kg (200 lb 4.8 oz)   SpO2 95%   BMI 37.85 kg/m²     Physical Exam    General: Alert and oriented, No " acute distress.  Head: Normocephalic, Atraumatic.  Eye: Pupils are equal, round and reactive to light, Extraocular movements are intact, Sclera non-icteric.  Ears/Nose/Throat: Normal, Mucosa moist,Clear.  Neck/Thyroid: Supple, Non-tender, No carotid bruit, No palpable thyromegaly or thyroid nodule, No lymphadenopathy, No JVD, Full range of motion.  Respiratory: Clear to auscultation bilaterally; No wheezes, rales or rhonchi,Non-labored respirations, Symmetrical chest wall expansion.  Cardiovascular: Regular rate and rhythm, S1/S2 normal, No murmurs, rubs or gallops.  Gastrointestinal: Soft, Non-tender, Non-distended, Normal bowel sounds, No palpable organomegaly.  Musculoskeletal: Normal range of motion.  Integumentary: Warm, Dry, Intact, No suspicious lesions or rashes.  Extremities: No clubbing, cyanosis or edema  Neurologic: No focal deficits, Cranial Nerves II-XII are grossly intact, Motor strength normal upper and lower extremities, Sensory exam intact.  Psychiatric: Normal interaction, Coherent speech, Euthymic mood, Appropriate affect       Assessment:     No diagnosis found.     Plan:       Medicare Annual Wellness and Personalized Prevention Plan:     Fall Risk + Home Safety + Hearing Impairment + Depression Screen + Cognitive Impairment Screen + Health Risk Assessment all reviewed.     No flowsheet data found.  Depression Screeening PHQ2 12/8/2022 11/2/2022 10/26/2022   Over the last two weeks how often have you been bothered by little interest or pleasure in doing things 0 0 0   Over the last two weeks how often have you been bothered by feeling down, depressed or hopeless 0 0 0   PHQ-2 Total Score 0 0 0     Fall Risk Assessment - Outpatient 12/8/2022 11/2/2022 10/26/2022   Mobility Status Ambulatory Ambulatory Ambulatory   Number of falls 0 0 0   Identified as fall risk 0 0 0                               Alcohol/Tobacco Use - Stressed importance of smoking cessation and limiting alcohol intake.  CVD  Risk Factors - Reviewed  Obesity/Physical Activity - Normal BMI. Encouraged daily 30 minute physical activity x 5 days per week.      Health Maintenance Topics with due status: Not Due       Topic Last Completion Date    Colorectal Cancer Screening 09/23/2020    Lipid Panel 04/14/2021        AAA Screening - No prior screening. Will order US AAA for Welcome to Medicare.  Prostate Cancer Screening - No prior screening. Will order PSA today. 1/2018. Follow up annually.  Cervical Cancer Screening - Last Pap on - 1/2018. NIL. Follow up with GYN Clinic for Pap/Pelvic.   Breast Cancer Screening - Last Mammogram on 1/2018. Normal. Follow up in 1 year    Colon Cancer Screening - Colonoscopy on 1/2018. Follow up in 1 year . FIT Negative on 1/2018.  Osteoporosis Screening - Last DEXA on 1/2018. Results show Normal Bone Density. Follow up in 1 year.  Eye Exam - Last Eye Exam - 1/2018.  Dental Exam - Recommend biannually  Vaccinations -   Immunization History   Administered Date(s) Administered    COVID-19, MRNA, LN-S, PF (Pfizer) (Purple Cap) 01/20/2022    COVID-19, vector-nr, rS-Ad26, PF ([employer]) 03/05/2021          There are no diagnoses linked to this encounter.      Medication List with Changes/Refills   Current Medications    ATORVASTATIN (LIPITOR) 20 MG TABLET    Take 20 mg by mouth Daily.       Start Date: 12/15/2021End Date: --    BUSPIRONE (BUSPAR) 10 MG TABLET    TAKE 1 TABLET BY MOUTH TWICE A DAY AS NEEDED FOR ANXIETY       Start Date: 7/5/2022  End Date: --    CHOLECALCIFEROL, VITAMIN D3, 1,250 MCG (50,000 UNIT) CAPSULE    Take 1 capsule (50,000 Units total) by mouth every 7 days.       Start Date: 6/28/2022 End Date: --    DILTIAZEM (TIAZAC) 240 MG CS24    Take 240 mg by mouth.       Start Date: 11/21/2022End Date: --    LEVOCETIRIZINE (XYZAL) 5 MG TABLET    Take 1 tablet (5 mg total) by mouth Daily.       Start Date: 10/10/2022End Date: 10/10/2023    LIRAGLUTIDE, WEIGHT LOSS, (SAXENDA) 3 MG/0.5 ML (18 MG/3 ML)  PNIJ    Inject 3 mLs into the skin.       Start Date: --        End Date: --    LIRAGLUTIDE, WEIGHT LOSS, (SAXENDA) 3 MG/0.5 ML (18 MG/3 ML) PNIJ    Start 0.6 mg per day for one week; then increase dose by 0.6 mg in weekly intervals until a max dose of 3 mg is reached.       Start Date: 11/2/2022 End Date: --    LOSARTAN-HYDROCHLOROTHIAZIDE 100-25 MG (HYZAAR) 100-25 MG PER TABLET    Take 1 tablet by mouth Daily.       Start Date: 8/19/2021 End Date: --    SUVOREXANT (BELSOMRA) 20 MG TAB    Take 20 mg by mouth Daily.       Start Date: 1/11/2022 End Date: --          The patient's Health Maintenance was reviewed and the following appears to be due at this time:   Health Maintenance Due   Topic Date Due    TETANUS VACCINE  Never done    DEXA Scan  Never done    Shingles Vaccine (1 of 2) Never done    Pneumococcal Vaccines (Age 65+) (1 - PCV) Never done    Mammogram  02/23/2022    COVID-19 Vaccine (3 - Booster for Ana Maria series) 03/17/2022         No follow-ups on file.

## 2022-12-08 NOTE — PROGRESS NOTES
Patient ID: [id number]     Chief Complaint: Obesity followup        HPI:     Wen Otoole is a 67 y.o. female here today for a follow up obesity.  - She has lost 3 pounds since last visit with Saxenda, no side effects, she needs Rx refill today.   - She needs MMG and DEXA scan ordered at [facility].  - HTN and HLD are controlled and followed by her Cardiology (Dr. Vidal).   - She was seen by PA for achilles tendinitis in both ankles, cannot afford PT, needs Ortho referral because pain is still present.   - Patient is without any other complaints today.     ----------------------------  Binge eating disorder  Generalized anxiety disorder  Hypertensive disorder  Insomnia  Obesity, unspecified  BENIGNO (obstructive sleep apnea)  Personal history of colonic polyps     Past Surgical History:   Procedure Laterality Date    BACK SURGERY      CHOLECYSTECTOMY      COLONOSCOPY  09/23/2020    KNEE ARTHROSCOPY      LUMBAR FUSION      NECK SURGERY         Review of patient's allergies indicates:  No Known Allergies    Outpatient Medications Marked as Taking for the 12/8/22 encounter (Office Visit) with Marianela Ulrich MD   Medication Sig Dispense Refill    atorvastatin (LIPITOR) 20 MG tablet Take 20 mg by mouth Daily.      busPIRone (BUSPAR) 10 MG tablet TAKE 1 TABLET BY MOUTH TWICE A DAY AS NEEDED FOR ANXIETY 180 tablet 1    cholecalciferol, vitamin D3, 1,250 mcg (50,000 unit) capsule Take 1 capsule (50,000 Units total) by mouth every 7 days. 4 capsule 3    diltiaZEM (TIAZAC) 240 MG Cs24 Take 240 mg by mouth.      levocetirizine (XYZAL) 5 MG tablet Take 1 tablet (5 mg total) by mouth Daily. 90 tablet 3    losartan-hydrochlorothiazide 100-25 mg (HYZAAR) 100-25 mg per tablet Take 1 tablet by mouth Daily.      suvorexant (BELSOMRA) 20 mg Tab Take 20 mg by mouth Daily.      [DISCONTINUED] liraglutide, weight loss, (SAXENDA) 3 mg/0.5 mL (18 mg/3 mL) PnIj Inject 3 mLs into the skin.      [DISCONTINUED]  liraglutide, weight loss, (SAXENDA) 3 mg/0.5 mL (18 mg/3 mL) PnIj Start 0.6 mg per day for one week; then increase dose by 0.6 mg in weekly intervals until a max dose of 3 mg is reached. 1 each 3       Social History     Socioeconomic History    Marital status: Single   Tobacco Use    Smoking status: Former     Types: Cigarettes     Passive exposure: Past    Smokeless tobacco: Never   Substance and Sexual Activity    Alcohol use: Never    Drug use: Never    Sexual activity: Not Currently     Partners: Female     Birth control/protection: None        Family History   Problem Relation Age of Onset    Hypertension Mother     Diabetes Mother     Coronary artery disease Mother     Coronary artery disease Brother         Subjective:       Review of Systems:    See HPI for details    Constitutional: Denies Change in appetite. Denies Chills. Denies Fever. Denies Night sweats.  Eye: Denies Blurred vision. Denies Discharge. Denies Eye pain.  ENT: Denies Decreased hearing. Denies Sore throat. Denies Swollen glands.  Respiratory: Denies Cough. Denies Shortness of breath. Denies Shortness of breath with exertion. Denies Wheezing.  Cardiovascular: Denies Chest pain at rest. Denies Chest pain with exertion. Denies Irregular heartbeat. Denies Palpitations.  Gastrointestinal: Denies Abdominal pain. Denies Diarrhea. Denies Nausea. Denies Vomiting. Denies Hematemesis or Hematochezia.  Genitourinary: Denies Dysuria. Denies Urinary frequency. Denies Urinary urgency. Denies Blood in urine.  Endocrine: Denies Cold intolerance. Denies Excessive thirst. Denies Heat intolerance. Denies Weight loss. Denies Weight gain.  Musculoskeletal: Painful joints. Denies Weakness.  Integumentary: Denies Rash. Denies Itching. Denies Dry skin.  Neurologic: Denies Dizziness. Denies Fainting. Denies Headache.  Psychiatric: Denies Depression. Denies Anxiety. Denies Suicidal/Homicidal ideations.    All Other ROS: Negative except as stated in HPI.  "      Objective:     /82 (BP Location: Left arm, Patient Position: Sitting, BP Method: Medium (Automatic))   Pulse 73   Temp 97.7 °F (36.5 °C)   Resp 18   Ht 5' 1" (1.549 m)   Wt 90.9 kg (200 lb 4.8 oz)   SpO2 95%   BMI 37.85 kg/m²     Physical Exam    General: Alert and oriented, No acute distress. Obese.   Head: Normocephalic, Atraumatic.  Eye: Pupils are equal, round and reactive to light, Extraocular movements are intact, Sclera non-icteric.  Ears/Nose/Throat: Normal, Mucosa moist,Clear.  Neck/Thyroid: Supple, Non-tender, No carotid bruit, No palpable thyromegaly or thyroid nodule, No lymphadenopathy, No JVD, Full range of motion.  Respiratory: Clear to auscultation bilaterally; No wheezes, rales or rhonchi,Non-labored respirations, Symmetrical chest wall expansion.  Cardiovascular: Regular rate and rhythm, S1/S2 normal, No murmurs, rubs or gallops.  Gastrointestinal: Soft, Non-tender, Non-distended, Normal bowel sounds, No palpable organomegaly.  Musculoskeletal: Normal range of motion.  Integumentary: Warm, Dry, Intact, No suspicious lesions or rashes.  Extremities: No clubbing, cyanosis or edema  Neurologic: No focal deficits, Cranial Nerves II-XII are grossly intact, Motor strength normal upper and lower extremities, Sensory exam intact.  Psychiatric: Normal interaction, Coherent speech, Euthymic mood, Appropriate affect         Assessment:       ICD-10-CM ICD-9-CM   1. Achilles tendinitis of both lower extremities  M76.61 726.71    M76.62    2. Class 2 severe obesity due to excess calories with serious comorbidity and body mass index (BMI) of 37.0 to 37.9 in adult  E66.01 278.01    Z68.37 V85.37   3. Visit for screening mammogram  Z12.31 V76.12   4. Postmenopausal  Z78.0 V49.81   5. Screening for osteoporosis  Z13.820 V82.81        Plan:     Problem List Items Addressed This Visit    None  Visit Diagnoses       Achilles tendinitis of both lower extremities    -  Primary    Relevant Orders    " Ambulatory referral/consult to Orthopedics    Class 2 severe obesity due to excess calories with serious comorbidity and body mass index (BMI) of 37.0 to 37.9 in adult        Relevant Medications    liraglutide, weight loss, (SAXENDA) 3 mg/0.5 mL (18 mg/3 mL) PnIj    Visit for screening mammogram        Relevant Orders    Mammo Digital Screening Bilat w/ Dileep    Postmenopausal        Relevant Orders    DXA Bone Density Spine And Hip    Screening for osteoporosis        Relevant Orders    DXA Bone Density Spine And Hip         1. Achilles tendinitis of both lower extremities  - Ambulatory referral/consult to Orthopedics; Future for evaluation and treatment.    2. Class 2 severe obesity due to excess calories with serious comorbidity and body mass index (BMI) of 37.0 to 37.9 in adult  - Rx liraglutide, weight loss, (SAXENDA) 3 mg/0.5 mL (18 mg/3 mL) PnIj; Inject 3 mg into the skin once daily.  Dispense: 15 mL; Refill: 11 refilled today.   Body mass index is 37.85 kg/m².  Goal BMI <30.  Exercise 5 times a week for 30 minutes per day.  Avoid soda, simple sugars, excessive rice, potatoes or bread. Limit fast foods and fried foods.  Choose complex carbs in moderation (example: green vegetables, beans, oatmeal). Eat plenty of fresh fruits and vegetables with lean meats daily.  Do not skip meals. Eat a balanced portion size.  Avoid fad diets. Consider permanent healthy life style changes.      3. Visit for screening mammogram  - Mammo Digital Screening Bilat w/ Dileep; Future  - Mammo Digital Screening Bilat w/ Dileep    4. Postmenopausal  - DXA Bone Density Spine And Hip; Future  - DXA Bone Density Spine And Hip    5. Screening for osteoporosis  - DXA Bone Density Spine And Hip; Future  - DXA Bone Density Spine And Hip      Wen was seen today for obesity followup.    Diagnoses and all orders for this visit:    Achilles tendinitis of both lower extremities  -     Ambulatory referral/consult to Orthopedics; Future    Class 2  severe obesity due to excess calories with serious comorbidity and body mass index (BMI) of 37.0 to 37.9 in adult  -     liraglutide, weight loss, (SAXENDA) 3 mg/0.5 mL (18 mg/3 mL) PnIj; Inject 3 mg into the skin once daily.    Visit for screening mammogram  -     Mammo Digital Screening Bilat w/ Dileep; Future  -     Mammo Digital Screening Bilat w/ Dileep    Postmenopausal  -     DXA Bone Density Spine And Hip; Future  -     DXA Bone Density Spine And Hip    Screening for osteoporosis  -     DXA Bone Density Spine And Hip; Future  -     DXA Bone Density Spine And Hip        Medication List with Changes/Refills   New Medications    LIRAGLUTIDE, WEIGHT LOSS, (SAXENDA) 3 MG/0.5 ML (18 MG/3 ML) PNIJ    Inject 3 mg into the skin once daily.       Start Date: 12/8/2022 End Date: 12/8/2023   Current Medications    ATORVASTATIN (LIPITOR) 20 MG TABLET    Take 20 mg by mouth Daily.       Start Date: 12/15/2021End Date: --    BUSPIRONE (BUSPAR) 10 MG TABLET    TAKE 1 TABLET BY MOUTH TWICE A DAY AS NEEDED FOR ANXIETY       Start Date: 7/5/2022  End Date: --    CHOLECALCIFEROL, VITAMIN D3, 1,250 MCG (50,000 UNIT) CAPSULE    Take 1 capsule (50,000 Units total) by mouth every 7 days.       Start Date: 6/28/2022 End Date: --    DILTIAZEM (TIAZAC) 240 MG CS24    Take 240 mg by mouth.       Start Date: 11/21/2022End Date: --    LEVOCETIRIZINE (XYZAL) 5 MG TABLET    Take 1 tablet (5 mg total) by mouth Daily.       Start Date: 10/10/2022End Date: 10/10/2023    LOSARTAN-HYDROCHLOROTHIAZIDE 100-25 MG (HYZAAR) 100-25 MG PER TABLET    Take 1 tablet by mouth Daily.       Start Date: 8/19/2021 End Date: --    SUVOREXANT (BELSOMRA) 20 MG TAB    Take 20 mg by mouth Daily.       Start Date: 1/11/2022 End Date: --   Discontinued Medications    LIRAGLUTIDE, WEIGHT LOSS, (SAXENDA) 3 MG/0.5 ML (18 MG/3 ML) PNIJ    Inject 3 mLs into the skin.       Start Date: --        End Date: 12/8/2022    LIRAGLUTIDE, WEIGHT LOSS, (SAXENDA) 3 MG/0.5 ML (18 MG/3  ML) PNIJ    Start 0.6 mg per day for one week; then increase dose by 0.6 mg in weekly intervals until a max dose of 3 mg is reached.       Start Date: 11/2/2022 End Date: 12/8/2022          Follow up in about 3 months (around 3/8/2023) for Wellness.

## 2022-12-12 ENCOUNTER — TELEPHONE (OUTPATIENT)
Dept: FAMILY MEDICINE | Facility: CLINIC | Age: 67
End: 2022-12-12
Payer: COMMERCIAL

## 2022-12-12 NOTE — TELEPHONE ENCOUNTER
Called [facility] in [place]. Spoke to [facility] whom had called. Told her PA was in Covermymeds, but not completed yet. Told her will try to get PA done today. Voiced understanding.

## 2022-12-12 NOTE — TELEPHONE ENCOUNTER
----- Message from Theresa Nagel sent at 12/12/2022  9:59 AM CST -----  Regarding: PA for script  Type:  Pharmacy Calling to Clarify an RX    Name of Caller:Ana  Pharmacy Name:[id number] - [place], [place] - [address]  Prescription Name:liraglutide, weight loss, (SAXENDA) 3 mg/0.5 mL (18 mg/3   What do they need to clarify?:Call about a PA for script.  Best Call Back Number:[phone]  Additional Information: Ana call from the pharmacy to find out if a PA was done for this script.  Please call with a response.

## 2022-12-28 ENCOUNTER — TELEPHONE (OUTPATIENT)
Dept: PRIMARY CARE CLINIC | Facility: CLINIC | Age: 67
End: 2022-12-28
Payer: COMMERCIAL

## 2022-12-28 NOTE — TELEPHONE ENCOUNTER
----- Message from Molly Grider sent at 12/28/2022  3:46 PM CST -----  Regarding: advice  Pt called aboutliraglutide, weight loss, (SAXENDA) 3 mg/0.5 mL (18 mg/3 mL) PnIj not being covered through her insurance now but she did get mor insurance and it will cover it after the 1st of month call pt for claification  [phone]

## 2023-01-03 ENCOUNTER — TELEPHONE (OUTPATIENT)
Dept: FAMILY MEDICINE | Facility: CLINIC | Age: 68
End: 2023-01-03
Payer: MEDICARE

## 2023-01-03 NOTE — TELEPHONE ENCOUNTER
----- Message from Alise Montana sent at 1/3/2023 10:18 AM CST -----  Regarding: Med Advice  .Type:  Needs Medical Advice    Who Called: pt  Symptoms (please be specific): weight loss    Pharmacy name and phone #:  [id number] - [place], [place] - [address]  Would the patient rather a call back or a response via [facility]? Call back   Best Call Back Number: [phone]  Additional Information: pt is requesting an coupon for liraglutide, weight loss, (SAXENDA) 3 mg/0.5 mL (18 mg/3 mL) PnIj, she stated with her insurance the coupons that are available is private insurance and would like to know if the office has any coupons that could help, because she can't afford to pay the co-pay. Please advise and give patient a call.

## 2023-01-04 ENCOUNTER — TELEPHONE (OUTPATIENT)
Dept: FAMILY MEDICINE | Facility: CLINIC | Age: 68
End: 2023-01-04
Payer: MEDICARE

## 2023-01-04 NOTE — TELEPHONE ENCOUNTER
----- Message from Rosa Bills MA sent at 1/4/2023 11:01 AM CST -----  Regarding: pen for weight loss  .Type:  Needs Medical Advice    Who Called: pt     Pharm[facility] and phone #:  [facility] [place]    [place] Call Back Number:  [phone]    Additional Information:  pt found pen for weight loss she would like to see if we can call her back now because her insurance covers this type of pen it is called Tremaine 18mm 3 pen she is going out of town

## 2023-01-06 DIAGNOSIS — F41.9 ANXIETY DISORDER, UNSPECIFIED: ICD-10-CM

## 2023-01-06 RX ORDER — BUSPIRONE HYDROCHLORIDE 10 MG/1
TABLET ORAL
Qty: 180 TABLET | Refills: 1 | Status: SHIPPED | OUTPATIENT
Start: 2023-01-06 | End: 2023-04-18

## 2023-01-19 ENCOUNTER — TELEPHONE (OUTPATIENT)
Dept: FAMILY MEDICINE | Facility: CLINIC | Age: 68
End: 2023-01-19
Payer: MEDICARE

## 2023-01-19 NOTE — TELEPHONE ENCOUNTER
----- Message from Ayde Hood sent at 1/19/2023  4:09 PM CST -----  .Type:  RX Refill Request    Who Called: pt  Refill or New Rx:new    Preferred Pharmacy with phone number:[id number] - [address]  Local or Mail Order:local  Ordering Provider:Mojgan  Would the patient rather a call back or a response via [facility]? Call back  Best Call Back Number:[phone]  Additional Information: pt is requesting cough syrup she's been coughing for about  4 or 5 days , it's a dry cough and worst at night ,

## 2023-01-19 NOTE — TELEPHONE ENCOUNTER
Call pt and informed her PCP is out until 01/24/2023. If she needed to get cough meds sooner she could be seen at the urgent care

## 2023-02-08 ENCOUNTER — PATIENT MESSAGE (OUTPATIENT)
Dept: RESEARCH | Facility: HOSPITAL | Age: 68
End: 2023-02-08
Payer: MEDICARE

## 2023-03-08 ENCOUNTER — TELEPHONE (OUTPATIENT)
Dept: FAMILY MEDICINE | Facility: CLINIC | Age: 68
End: 2023-03-08
Payer: MEDICARE

## 2023-03-08 ENCOUNTER — LAB VISIT (OUTPATIENT)
Dept: LAB | Facility: HOSPITAL | Age: 68
End: 2023-03-08
Attending: FAMILY MEDICINE
Payer: MEDICARE

## 2023-03-08 DIAGNOSIS — R79.9 ABNORMAL FINDING OF BLOOD CHEMISTRY, UNSPECIFIED: ICD-10-CM

## 2023-03-08 DIAGNOSIS — I10 HYPERTENSION, UNSPECIFIED TYPE: ICD-10-CM

## 2023-03-08 DIAGNOSIS — E66.9 OBESITY (BMI 35.0-39.9 WITHOUT COMORBIDITY): ICD-10-CM

## 2023-03-08 DIAGNOSIS — Z00.00 WELL ADULT EXAM: Primary | ICD-10-CM

## 2023-03-08 DIAGNOSIS — R94.120 ABNORMAL AUDITORY FUNCTION STUDY: ICD-10-CM

## 2023-03-08 DIAGNOSIS — Z00.00 WELL ADULT EXAM: ICD-10-CM

## 2023-03-08 DIAGNOSIS — E66.9 OBESITY, UNSPECIFIED CLASSIFICATION, UNSPECIFIED OBESITY TYPE, UNSPECIFIED WHETHER SERIOUS COMORBIDITY PRESENT: ICD-10-CM

## 2023-03-08 LAB
ALBUMIN SERPL-MCNC: 3.9 G/DL (ref 3.4–4.8)
ALBUMIN/GLOB SERPL: 1.1 RATIO (ref 1.1–2)
ALP SERPL-CCNC: 82 UNIT/L (ref 40–150)
ALT SERPL-CCNC: 174 UNIT/L (ref 0–55)
AST SERPL-CCNC: 20 UNIT/L (ref 5–34)
BASOPHILS # BLD AUTO: 0.03 X10(3)/MCL (ref 0–0.2)
BASOPHILS NFR BLD AUTO: 0.5 %
BILIRUBIN DIRECT+TOT PNL SERPL-MCNC: 0.8 MG/DL
BUN SERPL-MCNC: 26.9 MG/DL (ref 9.8–20.1)
CALCIUM SERPL-MCNC: 9.8 MG/DL (ref 8.4–10.2)
CHLORIDE SERPL-SCNC: 106 MMOL/L (ref 98–107)
CHOLEST SERPL-MCNC: 250 MG/DL
CHOLEST/HDLC SERPL: 3 {RATIO} (ref 0–5)
CO2 SERPL-SCNC: 25 MMOL/L (ref 23–31)
CREAT SERPL-MCNC: 1.02 MG/DL (ref 0.55–1.02)
EOSINOPHIL # BLD AUTO: 0.08 X10(3)/MCL (ref 0–0.9)
EOSINOPHIL NFR BLD AUTO: 1.2 %
ERYTHROCYTE [DISTWIDTH] IN BLOOD BY AUTOMATED COUNT: 16.1 % (ref 11.5–17)
EST. AVERAGE GLUCOSE BLD GHB EST-MCNC: 116.9 MG/DL
GFR SERPLBLD CREATININE-BSD FMLA CKD-EPI: 60 MLS/MIN/1.73/M2
GLOBULIN SER-MCNC: 3.7 GM/DL (ref 2.4–3.5)
GLUCOSE SERPL-MCNC: 100 MG/DL (ref 82–115)
HAV IGM SERPL QL IA: NONREACTIVE
HBA1C MFR BLD: 5.7 %
HBV CORE IGM SERPL QL IA: REACTIVE
HBV SURFACE AG SERPL QL IA: NONREACTIVE
HCT VFR BLD AUTO: 44.6 % (ref 37–47)
HCV AB SERPL QL IA: NONREACTIVE
HDLC SERPL-MCNC: 94 MG/DL (ref 35–60)
HGB BLD-MCNC: 14.5 G/DL (ref 12–16)
IMM GRANULOCYTES # BLD AUTO: 0.01 X10(3)/MCL (ref 0–0.04)
IMM GRANULOCYTES NFR BLD AUTO: 0.2 %
LDLC SERPL CALC-MCNC: 140 MG/DL (ref 50–140)
LYMPHOCYTES # BLD AUTO: 3.15 X10(3)/MCL (ref 0.6–4.6)
LYMPHOCYTES NFR BLD AUTO: 48.2 %
MCH RBC QN AUTO: 29.7 PG
MCHC RBC AUTO-ENTMCNC: 32.5 G/DL (ref 33–36)
MCV RBC AUTO: 91.4 FL (ref 80–94)
MONOCYTES # BLD AUTO: 0.49 X10(3)/MCL (ref 0.1–1.3)
MONOCYTES NFR BLD AUTO: 7.5 %
NEUTROPHILS # BLD AUTO: 2.78 X10(3)/MCL (ref 2.1–9.2)
NEUTROPHILS NFR BLD AUTO: 42.4 %
NRBC BLD AUTO-RTO: 0 %
PLATELET # BLD AUTO: 205 X10(3)/MCL (ref 130–400)
PMV BLD AUTO: 9.2 FL (ref 7.4–10.4)
POTASSIUM SERPL-SCNC: 3.7 MMOL/L (ref 3.5–5.1)
PROT SERPL-MCNC: 7.6 GM/DL (ref 5.8–7.6)
RBC # BLD AUTO: 4.88 X10(6)/MCL (ref 4.2–5.4)
SODIUM SERPL-SCNC: 141 MMOL/L (ref 136–145)
TRIGL SERPL-MCNC: 80 MG/DL (ref 37–140)
TSH SERPL-ACNC: 1.89 UIU/ML (ref 0.35–4.94)
VLDLC SERPL CALC-MCNC: 16 MG/DL
WBC # SPEC AUTO: 6.5 X10(3)/MCL (ref 4.5–11.5)

## 2023-03-08 PROCEDURE — 84443 ASSAY THYROID STIM HORMONE: CPT

## 2023-03-08 PROCEDURE — 80074 ACUTE HEPATITIS PANEL: CPT

## 2023-03-08 PROCEDURE — 80061 LIPID PANEL: CPT

## 2023-03-08 PROCEDURE — 36415 COLL VENOUS BLD VENIPUNCTURE: CPT

## 2023-03-08 PROCEDURE — 83036 HEMOGLOBIN GLYCOSYLATED A1C: CPT

## 2023-03-08 PROCEDURE — 85025 COMPLETE CBC W/AUTO DIFF WBC: CPT

## 2023-03-08 PROCEDURE — 80053 COMPREHEN METABOLIC PANEL: CPT

## 2023-03-09 LAB
BCS RECOMMENDATION EXT: NORMAL
BMD RECOMMENDATION EXT: NORMAL
PATH REV: NORMAL

## 2023-03-10 ENCOUNTER — DOCUMENTATION ONLY (OUTPATIENT)
Dept: FAMILY MEDICINE | Facility: CLINIC | Age: 68
End: 2023-03-10
Payer: MEDICARE

## 2023-03-14 ENCOUNTER — OFFICE VISIT (OUTPATIENT)
Dept: FAMILY MEDICINE | Facility: CLINIC | Age: 68
End: 2023-03-14
Payer: MEDICARE

## 2023-03-14 VITALS
RESPIRATION RATE: 16 BRPM | DIASTOLIC BLOOD PRESSURE: 81 MMHG | OXYGEN SATURATION: 97 % | BODY MASS INDEX: 37.43 KG/M2 | HEART RATE: 69 BPM | SYSTOLIC BLOOD PRESSURE: 134 MMHG | WEIGHT: 198.13 LBS

## 2023-03-14 DIAGNOSIS — Z00.00 MEDICARE ANNUAL WELLNESS VISIT, SUBSEQUENT: Primary | ICD-10-CM

## 2023-03-14 DIAGNOSIS — R73.03 PREDIABETES: ICD-10-CM

## 2023-03-14 DIAGNOSIS — E78.49 ESSENTIAL FAMILIAL HYPERLIPIDEMIA: ICD-10-CM

## 2023-03-14 DIAGNOSIS — K76.0 FATTY LIVER: ICD-10-CM

## 2023-03-14 DIAGNOSIS — R74.01 ELEVATED ALT MEASUREMENT: ICD-10-CM

## 2023-03-14 DIAGNOSIS — E66.01 CLASS 2 SEVERE OBESITY DUE TO EXCESS CALORIES WITH SERIOUS COMORBIDITY AND BODY MASS INDEX (BMI) OF 37.0 TO 37.9 IN ADULT: ICD-10-CM

## 2023-03-14 DIAGNOSIS — R76.8 HEPATITIS B ANTIBODY POSITIVE: ICD-10-CM

## 2023-03-14 DIAGNOSIS — I10 HYPERTENSION, UNSPECIFIED TYPE: ICD-10-CM

## 2023-03-14 PROCEDURE — G0439 PR MEDICARE ANNUAL WELLNESS SUBSEQUENT VISIT: ICD-10-PCS | Mod: ,,, | Performed by: FAMILY MEDICINE

## 2023-03-14 PROCEDURE — G0439 PPPS, SUBSEQ VISIT: HCPCS | Mod: ,,, | Performed by: FAMILY MEDICINE

## 2023-03-14 PROCEDURE — 99213 OFFICE O/P EST LOW 20 MIN: CPT | Mod: 25,,, | Performed by: FAMILY MEDICINE

## 2023-03-14 PROCEDURE — 99213 PR OFFICE/OUTPT VISIT, EST, LEVL III, 20-29 MIN: ICD-10-PCS | Mod: 25,,, | Performed by: FAMILY MEDICINE

## 2023-03-14 NOTE — PROGRESS NOTES
Patient ID: [id number]     Chief Complaint: Medicare AWV        HPI:     Wen Otoole is a 67 y.o. female here today for a Medicare Wellness.   Well Adult History   The patient presents for well adult exam. The patient's general health status is described as good. The patient's diet is described as balanced. Exercise: occasional. Associated symptoms consist of denies weight loss, denies weight gain, denies fatigue, denies headache, denies hearing loss and denies vision changes. Last menstrual period: Postmenopausal, she is UTD on DEXA scan at [facility] in 2023. followed by GYN. She is UTD on MMG in 03/2023 with GYN at [facility] or [facility], WNL. Additional pertinent history: last dental exam: 03/2023, last eye exam: 01/2023 (wears contacts), last pap smear : 12/23/2021 (WNL with GYN- Dr. Mckenzie), last Colonoscopy: 2020 with [facility], seat belt use, daily caffeine use (coffee), tobacco use none, quit 16 years ago, occasional alcohol use (wine) and She had labs done on 03/08/2023, here to discuss the results. She would not like STD screening. She refuses Prevnar 13, Pneumovax, Tdap, or Shingrix today, she will consider vaccines at this time. HTN/cholesterol is stable and asymptomatic with current Rx and followed by Cardiology (Dr. Vidal). Insomnia is controlled with Rx Belsomra, no side effects. She has a history of fatty liver, asymptomatic, non-compliant with diet, plans to get  back on track.  She is obese, lost 2 pounds since last visit, but would like lose more weight. She has tried diet and exercise without long term success. She has tried Saxenda, but it was too expensive. She has pre-diabetes, would like to try Mounjaro. She denies family history of medullary thyroid cancer, or MEN II, or personal history of pancreatitis. She is not interested in surgical weight loss or seeing a dietician.   - Patient is without any other complaints today.    denies Urinary leakage.  denies  Recent falls or balance difficulty.   admits to Daily exercise or physical activity.  denies Depression, stress, anxiety, or emotional lability.   denies The need for healthcare treatment including a cane/walker, blood pressure monitoring, or regular vision/hearing tests.     A separate E/M code has been provided to evaluate additional complaints that the patient would like addressed during the dedicated Medicare Wellness Exam.    Patient Care Team:  Marianela Ulrich MD as PCP - General (Family Medicine)  Cesar Tariq MD as Consulting Physician (Gastroenterology)  [facility]     Opioid Screening: Patient medication list reviewed, patient is not taking prescription opioids. Patient is not using additional opioids than prescribed. Patient is at low risk of substance abuse based on this opioid use history.      Advance Care Planning     Date: 03/14/2023  Patient did not wish or was not able to name a surrogate decision maker or provide an Advance Care Plan.        ----------------------------  Binge eating disorder  Generalized anxiety disorder  Hypertensive disorder  Insomnia  Obesity, unspecified  BENIGNO (obstructive sleep apnea)  Personal history of colonic polyps     Past Surgical History:   Procedure Laterality Date    BACK SURGERY      CHOLECYSTECTOMY      COLONOSCOPY  09/23/2020    KNEE ARTHROSCOPY      LUMBAR FUSION      NECK SURGERY         Review of patient's allergies indicates:  No Known Allergies    Outpatient Medications Marked as Taking for the 3/14/23 encounter (Office Visit) with Marianela Ulrich MD   Medication Sig Dispense Refill    atorvastatin (LIPITOR) 20 MG tablet Take 20 mg by mouth Daily.      busPIRone (BUSPAR) 10 MG tablet TAKE 1 TABLET BY MOUTH TWICE A DAY AS NEEDED FOR ANXIETY 180 tablet 1    cholecalciferol, vitamin D3, 1,250 mcg (50,000 unit) capsule Take 1 capsule (50,000 Units total) by mouth every 7 days. 4 capsule 3    diltiaZEM (TIAZAC) 240  MG Cs24 Take 240 mg by mouth.      levocetirizine (XYZAL) 5 MG tablet Take 1 tablet (5 mg total) by mouth Daily. 90 tablet 3    losartan-hydrochlorothiazide 100-25 mg (HYZAAR) 100-25 mg per tablet Take 1 tablet by mouth Daily.      suvorexant (BELSOMRA) 20 mg Tab Take 20 mg by mouth Daily.      [DISCONTINUED] liraglutide, weight loss, (SAXENDA) 3 mg/0.5 mL (18 mg/3 mL) PnIj Inject 3 mg into the skin once daily. 15 mL 11       Social History     Socioeconomic History    Marital status: Single   Tobacco Use    Smoking status: Former     Types: Cigarettes     Passive exposure: Past    Smokeless tobacco: Never   Substance and Sexual Activity    Alcohol use: Never    Drug use: Never    Sexual activity: Not Currently     Partners: Female     Birth control/protection: None        Family History   Problem Relation Age of Onset    Hypertension Mother     Diabetes Mother     Coronary artery disease Mother     Coronary artery disease Brother         Subjective:     ROS      See HPI for details    Constitutional: Weight loss, No fever, No chills, No fatigue.   Eye: No blurring, No visual disturbances.   Ear/Nose/Mouth/Throat: No decreased hearing, No ear pain, No nasal congestion, No sore throat.   Respiratory: No shortness of breath, No cough, No wheezing.   Cardiovascular: No chest pain, No palpitations, No peripheral edema.   Breast: Both breasts, No lump/ mass, No pain.   Nipple discharge: None.   Gastrointestinal: No nausea, No vomiting, No diarrhea, No constipation, No abdominal pain.   Genitourinary: No dysuria, No hematuria.   Gynecologic: Negative except as documented in history of present illness.   Hematology/Lymphatics: No bruising tendency, No bleeding tendency, No swollen lymph glands.   Endocrine: No excessive thirst, No polyuria, No excessive hunger.   Musculoskeletal: No joint pain, No muscle pain, No decreased range of motion.   Integumentary: No rash, No pruritus.   Neurologic: No abnormal balance, No  confusion, No headache.   Psychiatric: No anxiety, No depression, Not suicidal, No hallucinations.     All Other ROS: Negative except as stated in HPI.       Objective:     /81 (BP Location: Left arm, Patient Position: Sitting, BP Method: Large (Automatic))   Pulse 69   Resp 16   Wt 89.9 kg (198 lb 1.6 oz)   SpO2 97%   BMI 37.43 kg/m²     Physical Exam    General: Alert and oriented, No acute distress, Obese.   Eye: Pupils are equal, round and reactive to light, Extraocular movements are intact, Normal conjunctiva.   HENT: Normocephalic, Tympanic membranes are clear, Normal hearing, Oral mucosa is moist, No pharyngeal erythema.   Throat: Pharynx ( Not edematous, No exudate ).   Neck: Supple, Non-tender, No carotid bruit, No lymphadenopathy, No thyromegaly.   Respiratory: Lungs are clear to auscultation, Respirations are non-labored, Breath sounds are equal, Symmetrical chest wall expansion, No chest wall tenderness.   Cardiovascular: Normal rate, Regular rhythm, No murmur, Good pulses equal in all extremities, No edema.   Gastrointestinal: Soft, Non-tender, Non-distended, Normal bowel sounds, No organomegaly.   Genitourinary: No costovertebral angle tenderness.   Musculoskeletal: Normal range of motion, No tenderness, Normal gait.   Neurologic: No focal deficits, Cranial Nerves II-XII are grossly intact.   Psychiatric: Cooperative, Appropriate mood & affect, Normal judgment, Non-suicidal.   Mood and affect: Calm.   Behavior: Relaxed.     *Lab results from 03/08/2023 were reviewed and discussed with patient and patient voices understanding.*  FIB-4 =   Age (years)  67   x   AST Level (U/L)  20  Platelet Count (109/L)  205   x ?   ALT (U/L)  174  = 0.50    Assessment:       ICD-10-CM ICD-9-CM   1. Medicare annual wellness visit, subsequent  Z00.00 V70.0   2. Hypertension, unspecified type  I10 401.9   3. Essential familial hyperlipidemia  E78.49 272.2   4. Hepatitis B antibody positive  R76.8 795.79   5.  Fatty liver  K76.0 571.8   6. Elevated ALT measurement  R74.01 790.4   7. Prediabetes  R73.03 790.29   8. Class 2 severe obesity due to excess calories with serious comorbidity and body mass index (BMI) of 37.0 to 37.9 in adult  E66.01 278.01    Z68.37 V85.37        Plan:       Medicare Annual Wellness and Personalized Prevention Plan:     Fall Risk + Home Safety + Hearing Impairment + Depression Screen + Cognitive Impairment Screen + Health Risk Assessment all reviewed.     No flowsheet data found.  Depression Screeening PHQ2 3/14/2023 12/8/2022 11/2/2022 10/26/2022   Over the last two weeks how often have you been bothered by little interest or pleasure in doing things 0 0 0 0   Over the last two weeks how often have you been bothered by feeling down, depressed or hopeless 0 0 0 0   PHQ-2 Total Score 0 0 0 0     Fall Risk Assessment - Outpatient 3/14/2023 12/8/2022 11/2/2022 10/26/2022   Mobility Status Ambulatory Ambulatory Ambulatory Ambulatory   Number of falls 0 0 0 0   Identified as fall risk 0 0 0 0             What [age] your [age] 65-69  Are you male or female?: Female  During the past four weeks, how much have you been bothered by emotional problems such as feeling anxious, depressed, irritable, sad, or downhearted and blue?: Not at all  During the past five weeks, has your physical and/or emotional health limited your social activities with family, friends, neighbors, or groups?: Not at all  During the past four weeks, how much bodily pain have you generally had?: No pain  During the past four weeks, was someone available to help if you needed and wanted help?: Yes, some  During the past four weeks, what was the hardest physical activity you could do for at least two minutes?: Moderate  Can you get to places out of walking distance without help?  (For example, can you travel alone on buses or taxis, or drive your own car?): Yes  Can you go shopping for groceries or clothes without someone's help?: Yes  Can  you prepare your own meals?: Yes  Can you do your own housework without help?: Yes  Because of any health problems, do you need the help of another person with your personal care needs such as eating, bathing, dressing, or getting around the house?: No  Can you handle your own money without help?: Yes  During the past four weeks, how would you rate your health in general?: Very good  How have things been going for you during the past four weeks?: Pretty well  Are you having difficulties driving your car?: No  Do you always fasten your seat belt when you are in a car?: Yes, usually  How often in the past four weeks have you been bothered by falling or dizzy when standing up?: Seldom  How often in the past four weeks have you been bothered by sexual problems?: Never  How often in the past four weeks have you been bothered by trouble eating well?: Never  How often in the past four weeks have you been bothered by teeth or denture problems?: Never  How often in the past four weeks have you been bothered with problems using the telephone?: Never  How often in the past four weeks have you been bothered by tiredness or fatigue?: Seldom  Have you fallen two or more times in the past year?: No  Are you afraid of falling?: Yes  Are you a smoker?: No  During the past four weeks, how many drinks of wine, beer, or other alcoholic beverages did you have?: No alcohol at all  Do you exercise for about 20 minutes three or more days a week?: Yes, some of the time  Have you been given any information to help you with hazards in your house that might hurt you?: Yes  Have you been given any information to help you with keeping track of your medications?: Yes  How often do you have trouble taking medicines the way you've been told to take them?: I always take them as prescribed  How confident are you that you can control and manage most of your health problems?: Very confident  What is your race? (Check all that apply.):                   Alcohol/Tobacco Use - Stressed importance of smoking cessation and limiting alcohol intake.  CVD Risk Factors - Reviewed  Obesity/Physical Activity - Normal BMI. Encouraged daily 30 minute physical activity x 5 days per week.      Health Maintenance Topics with due status: Not Due       Topic Last Completion Date    Colorectal Cancer Screening 09/23/2020    Hemoglobin A1c (Prediabetes) 03/08/2023    Lipid Panel 03/08/2023          Vaccinations -   Immunization History   Administered Date(s) Administered    COVID-19, MRNA, LN-S, PF (Pfizer) (Purple Cap) 01/20/2022    COVID-19, vector-nr, rS-Ad26, PF ([employer]) 03/05/2021          1. Medicare annual wellness visit, subsequent  - Monthly breast self exam encouraged. Diet, exercise, and 10% weight loss encouraged. Keep appointment for dental exams x q6 months as scheduled. Keep appointment for annual eye exam as scheduled. Keep appointment with specialists as scheduled. Notify M.D. or ER if temp greater than 100.4, or any acute illness.      2. Hypertension, unspecified type  - BP is well controlled. Continue BP Rx as prescribed. Continue followup with Cardiology as scheduled. Keep daily BP log. Notify M.D. or ER if BP >170/100 or <90/60, chest pain, palpitations, headache, SOB, temp greater than 100.4, or any acute illness.   Continue  Low Sodium Diet (DASH Diet - Less than 2 grams of sodium per day).  Monitor blood pressure daily and log. Report consistent numbers greater than 140/90.  Smoking cessation encouraged to aid in BP reduction.  Maintain healthy weight with goal BMI <30. Exercise 30 minutes per day, 5 days per week.      3. Essential familial hyperlipidemia  - Cholesterol is well controlled, continue cholesterol Rx as prescribed, recheck CMP, FLP, CPK in 09/2023.  Continue  Stressed importance of dietary modifications. Follow a low cholesterol, low saturated fat diet with less that 200mg of cholesterol a day.  Avoid fried foods and high  saturated fats (high saturated fats less than 7% of calories).  Add Flax Seed/Fish Oil supplements to diet. Increase dietary fiber.  Regular exercise can reduce LDL and raise HDL. Stressed importance of physical activity 5 times per week for 30 minutes per day.      4. Hepatitis B antibody positive  - Hepatitis B DNA, Quant; Future  - Asymptomatic. Will followup results of Hep B viral load to determine if patient has active infection and will proceed with GI referral if she has active infection for evaluation and treatment.     5. Fatty liver  - Asymptomatic, fib-4 score: 0.5, needs low fat diet, and OTC Vitamin E as directed.     6. Elevated ALT measurement  - Asymptomatic. Will followup results of Hep B viral load for further recommendations. Needs to follow low fat diet, and avoid alcohol/Tylenol use if applicable. Notify M.D. or ER if symptoms persist or worsen, abdominal pain, vomiting, jaundice, temp >100.4, or any acute illness.      7. Prediabetes  - tirzepatide 2.5 mg/0.5 mL PnIj; Inject 2.5 mg into the skin every 7 days.  Dispense: 4 pen; Refill: 1  - Diet. exercise, and 10% weight loss encouraged. Rx trial of Mounjaro with close monitoring to help with pre-diabetes/insulin resistance and obesity. Will titrate Rx Mounjaro as needed/tolerated until max dose achieved. Recheck CMP, HgA1C in 09/2023. Notify M.D. or ER if symptoms persist or worsen, adverse Rx side effects, temp greater than 100.4, or any acute illness.    Lab Results   Component Value Date    HGBA1C 5.7 03/08/2023      Continue   Follow ADA Diet. Avoid soda, simple sweets, and limit rice/pasta/breads/starches.  Maintain healthy weight with goal BMI <30.  Exercise 5 times per week for 30 minutes per day.    8. Class 2 severe obesity due to excess calories with serious comorbidity and body mass index (BMI) of 37.0 to 37.9 in adult  - Same #7.  Body mass index is 37.43 kg/m².  Goal BMI <30.  Exercise 5 times a week for 30 minutes per day.  Avoid  soda, simple sugars, excessive rice, potatoes or bread. Limit fast foods and fried foods.  Choose complex carbs in moderation (example: green vegetables, beans, oatmeal). Eat plenty of fresh fruits and vegetables with lean meats daily.  Do not skip meals. Eat a balanced portion size.  Avoid fad diets. Consider permanent healthy life style changes.        Medication List with Changes/Refills   New Medications    TIRZEPATIDE 2.5 MG/0.5 ML PNIJ    Inject 2.5 mg into the skin every 7 days.       Start Date: 3/14/2023 End Date: 5/13/2023   Current Medications    ATORVASTATIN (LIPITOR) 20 MG TABLET    Take 20 mg by mouth Daily.       Start Date: 12/15/2021End Date: --    BUSPIRONE (BUSPAR) 10 MG TABLET    TAKE 1 TABLET BY MOUTH TWICE A DAY AS NEEDED FOR ANXIETY       Start Date: 1/6/2023  End Date: --    CHOLECALCIFEROL, VITAMIN D3, 1,250 MCG (50,000 UNIT) CAPSULE    Take 1 capsule (50,000 Units total) by mouth every 7 days.       Start Date: 6/28/2022 End Date: --    DILTIAZEM (TIAZAC) 240 MG CS24    Take 240 mg by mouth.       Start Date: 11/21/2022End Date: --    LEVOCETIRIZINE (XYZAL) 5 MG TABLET    Take 1 tablet (5 mg total) by mouth Daily.       Start Date: 10/10/2022End Date: 10/10/2023    LOSARTAN-HYDROCHLOROTHIAZIDE 100-25 MG (HYZAAR) 100-25 MG PER TABLET    Take 1 tablet by mouth Daily.       Start Date: 8/19/2021 End Date: --    SUVOREXANT (BELSOMRA) 20 MG TAB    Take 20 mg by mouth Daily.       Start Date: 1/11/2022 End Date: --   Discontinued Medications    LIRAGLUTIDE, WEIGHT LOSS, (SAXENDA) 3 MG/0.5 ML (18 MG/3 ML) PNIJ    Inject 3 mg into the skin once daily.       Start Date: 12/8/2022 End Date: 3/14/2023          The patient's Health Maintenance was reviewed and the following appears to be due at this time:   Health Maintenance Due   Topic Date Due    DEXA Scan  Never done    Mammogram  02/23/2022         Follow up in about 4 weeks (around 4/11/2023) for Prediabetes Followup, Obesity Followup; *[facility]A  [facility].

## 2023-03-17 ENCOUNTER — LAB VISIT (OUTPATIENT)
Dept: LAB | Facility: HOSPITAL | Age: 68
End: 2023-03-17
Attending: FAMILY MEDICINE
Payer: MEDICARE

## 2023-03-17 DIAGNOSIS — E66.9 OBESITY (BMI 35.0-39.9 WITHOUT COMORBIDITY): ICD-10-CM

## 2023-03-17 DIAGNOSIS — Z00.00 WELL ADULT EXAM: ICD-10-CM

## 2023-03-17 DIAGNOSIS — R79.9 ABNORMAL FINDING OF BLOOD CHEMISTRY, UNSPECIFIED: ICD-10-CM

## 2023-03-17 DIAGNOSIS — R76.8 HEPATITIS B ANTIBODY POSITIVE: ICD-10-CM

## 2023-03-17 LAB
ALBUMIN SERPL-MCNC: 4.2 G/DL (ref 3.4–4.8)
ALBUMIN/GLOB SERPL: 1.2 RATIO (ref 1.1–2)
ALP SERPL-CCNC: 70 UNIT/L (ref 40–150)
ALT SERPL-CCNC: 31 UNIT/L (ref 0–55)
AST SERPL-CCNC: 16 UNIT/L (ref 5–34)
BASOPHILS # BLD AUTO: 0.03 X10(3)/MCL (ref 0–0.2)
BASOPHILS NFR BLD AUTO: 0.4 %
BILIRUBIN DIRECT+TOT PNL SERPL-MCNC: 1 MG/DL
BUN SERPL-MCNC: 15.5 MG/DL (ref 9.8–20.1)
CALCIUM SERPL-MCNC: 10.7 MG/DL (ref 8.4–10.2)
CHLORIDE SERPL-SCNC: 105 MMOL/L (ref 98–107)
CHOLEST SERPL-MCNC: 223 MG/DL
CHOLEST/HDLC SERPL: 3 {RATIO} (ref 0–5)
CO2 SERPL-SCNC: 27 MMOL/L (ref 23–31)
CREAT SERPL-MCNC: 0.94 MG/DL (ref 0.55–1.02)
DEPRECATED CALCIDIOL+CALCIFEROL SERPL-MC: 48.1 NG/ML (ref 30–80)
EOSINOPHIL # BLD AUTO: 0.08 X10(3)/MCL (ref 0–0.9)
EOSINOPHIL NFR BLD AUTO: 1.1 %
ERYTHROCYTE [DISTWIDTH] IN BLOOD BY AUTOMATED COUNT: 15.5 % (ref 11.5–17)
EST. AVERAGE GLUCOSE BLD GHB EST-MCNC: 111.2 MG/DL
GFR SERPLBLD CREATININE-BSD FMLA CKD-EPI: >60 MLS/MIN/1.73/M2
GLOBULIN SER-MCNC: 3.6 GM/DL (ref 2.4–3.5)
GLUCOSE SERPL-MCNC: 93 MG/DL (ref 82–115)
HBA1C MFR BLD: 5.5 %
HCT VFR BLD AUTO: 43.6 % (ref 37–47)
HDLC SERPL-MCNC: 87 MG/DL (ref 35–60)
HGB BLD-MCNC: 14.2 G/DL (ref 12–16)
IMM GRANULOCYTES # BLD AUTO: 0.03 X10(3)/MCL (ref 0–0.04)
IMM GRANULOCYTES NFR BLD AUTO: 0.4 %
LDLC SERPL CALC-MCNC: 122 MG/DL (ref 50–140)
LYMPHOCYTES # BLD AUTO: 2.66 X10(3)/MCL (ref 0.6–4.6)
LYMPHOCYTES NFR BLD AUTO: 37.2 %
MCH RBC QN AUTO: 30 PG
MCHC RBC AUTO-ENTMCNC: 32.6 G/DL (ref 33–36)
MCV RBC AUTO: 92.2 FL (ref 80–94)
MONOCYTES # BLD AUTO: 0.66 X10(3)/MCL (ref 0.1–1.3)
MONOCYTES NFR BLD AUTO: 9.2 %
NEUTROPHILS # BLD AUTO: 3.7 X10(3)/MCL (ref 2.1–9.2)
NEUTROPHILS NFR BLD AUTO: 51.7 %
NRBC BLD AUTO-RTO: 0 %
PLATELET # BLD AUTO: 226 X10(3)/MCL (ref 130–400)
PMV BLD AUTO: 9.5 FL (ref 7.4–10.4)
POTASSIUM SERPL-SCNC: 3.5 MMOL/L (ref 3.5–5.1)
PROT SERPL-MCNC: 7.8 GM/DL (ref 5.8–7.6)
RBC # BLD AUTO: 4.73 X10(6)/MCL (ref 4.2–5.4)
SODIUM SERPL-SCNC: 144 MMOL/L (ref 136–145)
TRIGL SERPL-MCNC: 71 MG/DL (ref 37–140)
TSH SERPL-ACNC: 1.44 UIU/ML (ref 0.35–4.94)
VLDLC SERPL CALC-MCNC: 14 MG/DL
WBC # SPEC AUTO: 7.2 X10(3)/MCL (ref 4.5–11.5)

## 2023-03-17 PROCEDURE — 87517 HEPATITIS B DNA QUANT: CPT | Mod: 90

## 2023-03-17 PROCEDURE — 80053 COMPREHEN METABOLIC PANEL: CPT

## 2023-03-17 PROCEDURE — 84443 ASSAY THYROID STIM HORMONE: CPT

## 2023-03-17 PROCEDURE — 36415 COLL VENOUS BLD VENIPUNCTURE: CPT

## 2023-03-17 PROCEDURE — 83036 HEMOGLOBIN GLYCOSYLATED A1C: CPT

## 2023-03-17 PROCEDURE — 80061 LIPID PANEL: CPT

## 2023-03-17 PROCEDURE — 85025 COMPLETE CBC W/AUTO DIFF WBC: CPT

## 2023-03-17 PROCEDURE — 82306 VITAMIN D 25 HYDROXY: CPT

## 2023-03-18 LAB — HBV DNA SERPL NAA+PROBE-ACNC: NORMAL IU/ML

## 2023-03-20 DIAGNOSIS — E83.52 HYPERCALCEMIA: Primary | ICD-10-CM

## 2023-03-20 DIAGNOSIS — R77.1 ELEVATED SERUM GLOBULIN LEVEL: ICD-10-CM

## 2023-03-20 DIAGNOSIS — R77.8 ELEVATED TOTAL PROTEIN: ICD-10-CM

## 2023-03-20 DIAGNOSIS — R82.71 BACTERIA IN URINE: Primary | ICD-10-CM

## 2023-03-21 ENCOUNTER — TELEPHONE (OUTPATIENT)
Dept: FAMILY MEDICINE | Facility: CLINIC | Age: 68
End: 2023-03-21
Payer: MEDICARE

## 2023-03-21 NOTE — TELEPHONE ENCOUNTER
----- Message from Marianela Ulrich MD sent at 3/20/2023 12:52 PM CDT -----  UA shows trace blood and bacteria in urine, but also many squamous epithelial cells, suggestive of contaminated specimen, needs to repeat clean catch UA next available, order is in file.

## 2023-03-22 ENCOUNTER — LAB VISIT (OUTPATIENT)
Dept: LAB | Facility: HOSPITAL | Age: 68
End: 2023-03-22
Attending: FAMILY MEDICINE
Payer: MEDICARE

## 2023-03-22 DIAGNOSIS — R77.8 ELEVATED TOTAL PROTEIN: ICD-10-CM

## 2023-03-22 DIAGNOSIS — E21.3 HYPERPARATHYROIDISM: Primary | ICD-10-CM

## 2023-03-22 DIAGNOSIS — E83.52 HYPERCALCEMIA: ICD-10-CM

## 2023-03-22 DIAGNOSIS — R82.71 BACTERIA IN URINE: Primary | ICD-10-CM

## 2023-03-22 DIAGNOSIS — R77.1 ELEVATED SERUM GLOBULIN LEVEL: ICD-10-CM

## 2023-03-22 LAB — PTH-INTACT SERPL-MCNC: 84.2 PG/ML (ref 8.7–77)

## 2023-03-22 PROCEDURE — 84165 PROTEIN E-PHORESIS SERUM: CPT

## 2023-03-22 PROCEDURE — 86334 IMMUNOFIX E-PHORESIS SERUM: CPT

## 2023-03-22 PROCEDURE — 36415 COLL VENOUS BLD VENIPUNCTURE: CPT

## 2023-03-22 PROCEDURE — 83970 ASSAY OF PARATHORMONE: CPT

## 2023-03-23 ENCOUNTER — TELEPHONE (OUTPATIENT)
Dept: FAMILY MEDICINE | Facility: CLINIC | Age: 68
End: 2023-03-23
Payer: MEDICARE

## 2023-03-23 NOTE — TELEPHONE ENCOUNTER
----- Message from [facility] sent at 3/23/2023 11:03 AM CDT -----  Regarding: test results  .Type:  Test Results    Who Called:  pt    Name of Test (Lab/Mammo/Etc):  urine    Date of Test:  3-22-23    Ordering Provider:  Mojgan    Where the test was performed:  another site    Would the patient rather a call back? Yes    Best Call Back Number:  [phone]    Additional Information:   pt asking for results

## 2023-03-23 NOTE — TELEPHONE ENCOUNTER
----- Message from Simone Bucio sent at 3/22/2023  1:56 PM CDT -----  .Type:  Needs Medical Advice    Who Called: Wen   Symptoms (please be specific):    How long has patient had these symptoms:    Pharmacy name and phone #:    Would the patient rather a call back or a response via [facility]?   Best Call Back Number:   Additional Information: She is looking for lab results she hung up before I could get her information.

## 2023-03-24 LAB
ALBUMIN % SPEP (OHS): 51.45 (ref 48.1–59.5)
ALBUMIN SERPL BCP-MCNC: 3.7 G/DL
ALBUMIN/GLOB SERPL: 1.1 RATIO
ALPHA 1 GLOB (OHS): 0.3 GM/DL (ref 0–0.4)
ALPHA 1 GLOB% (OHS): 4.16 (ref 2.3–4.9)
ALPHA 2 GLOB % (OHS): 12.14 (ref 6.9–13)
ALPHA 2 GLOB (OHS): 0.86 GM/DL (ref 0.4–1)
BETA GLOB (OHS): 1.2 GM/DL (ref 0.7–1.3)
BETA GLOB% (OHS): 16.85 (ref 13.8–19.7)
GAMMA GLOBULIN % (OHS): 15.4 (ref 10.1–21.9)
GAMMA GLOBULIN (OHS): 1.09 GM/DL (ref 0.4–1.8)
GLOBULIN SER-MCNC: 3.4 GM/DL
M SPIKE % (OHS): NORMAL
M SPIKE (OHS): NORMAL
PATH REV: NORMAL
PROT SERPL-MCNC: 7.1 GM/DL (ref 5.8–7.6)

## 2023-03-27 ENCOUNTER — PATIENT MESSAGE (OUTPATIENT)
Dept: FAMILY MEDICINE | Facility: CLINIC | Age: 68
End: 2023-03-27
Payer: MEDICARE

## 2023-03-27 ENCOUNTER — TELEPHONE (OUTPATIENT)
Dept: FAMILY MEDICINE | Facility: CLINIC | Age: 68
End: 2023-03-27
Payer: MEDICARE

## 2023-03-29 ENCOUNTER — TELEPHONE (OUTPATIENT)
Dept: FAMILY MEDICINE | Facility: CLINIC | Age: 68
End: 2023-03-29
Payer: MEDICARE

## 2023-03-29 DIAGNOSIS — R39.9 UTI SYMPTOMS: Primary | ICD-10-CM

## 2023-03-29 NOTE — TELEPHONE ENCOUNTER
----- Message from Kelly Ortiz sent at 3/29/2023  2:31 PM CDT -----  .Type:  Patient Requesting Referral    Who Called:pt  Does the patient already have the specialty appointment scheduled?:no  If yes, what is the date of that appointment?:  Referral to What Specialty:ENDOCRINOLOGY  Reason for Referral:  Does the patient want the referral with a specific physician?:  Is the specialist an [facility]?:  Patient Requesting a Response?:yes  Would the patient rather a call back or a response via [facility]?   Best Call Back Number:[phone]  Additional Information: please call when appt is set

## 2023-03-29 NOTE — TELEPHONE ENCOUNTER
Pt has an appointment for endo.  Pt will bring a urine sample tomorrow to drop off and test if she has a UTI.

## 2023-03-31 ENCOUNTER — DOCUMENTATION ONLY (OUTPATIENT)
Dept: FAMILY MEDICINE | Facility: CLINIC | Age: 68
End: 2023-03-31
Payer: MEDICARE

## 2023-03-31 ENCOUNTER — TELEPHONE (OUTPATIENT)
Dept: FAMILY MEDICINE | Facility: CLINIC | Age: 68
End: 2023-03-31
Payer: MEDICARE

## 2023-03-31 ENCOUNTER — PATIENT MESSAGE (OUTPATIENT)
Dept: FAMILY MEDICINE | Facility: CLINIC | Age: 68
End: 2023-03-31
Payer: MEDICARE

## 2023-03-31 NOTE — TELEPHONE ENCOUNTER
----- Message from Marianela Ulrich MD sent at 3/31/2023  8:15 AM CDT -----  Urinalysis is clear.

## 2023-03-31 NOTE — TELEPHONE ENCOUNTER
----- Message from Marianela Ulrich MD sent at 3/31/2023 10:22 AM CDT -----  US thyroid done by her Endocrinologist shows plaques in her carotid arteries, please fax result to her Cardiologist (Dr. Vidal) for review/treatment.

## 2023-04-01 LAB
ALP SERPL-CCNC: 137 U/L
ALT SERPL-CCNC: 126 U/L
AST SERPL-CCNC: 45 U/L
CALCIUM SERPL-MCNC: 10.5 MG/DL
CO2 SERPL-SCNC: 33 MMOL/L

## 2023-04-03 ENCOUNTER — PATIENT MESSAGE (OUTPATIENT)
Dept: ADMINISTRATIVE | Facility: HOSPITAL | Age: 68
End: 2023-04-03
Payer: MEDICARE

## 2023-04-03 ENCOUNTER — TELEPHONE (OUTPATIENT)
Dept: FAMILY MEDICINE | Facility: CLINIC | Age: 68
End: 2023-04-03
Payer: MEDICARE

## 2023-04-03 NOTE — TELEPHONE ENCOUNTER
----- Message from Robin Cox sent at 4/3/2023 10:39 AM CDT -----  Regarding: call back  .Type:  Needs Medical Advice    Who Called: elena  Would the patient rather a call back or a response via [facility]? lawrence  Best Call Back Number: [phone]  Additional Information: pt said it was her first time using  tirzepatide 2.5 mg/0.5 mL and she broke one of her pens now she is a pen short for the month she said the insurance isn't going to pay caus eit's early pls call back pt .

## 2023-04-06 ENCOUNTER — DOCUMENTATION ONLY (OUTPATIENT)
Dept: FAMILY MEDICINE | Facility: CLINIC | Age: 68
End: 2023-04-06
Payer: MEDICARE

## 2023-04-08 DIAGNOSIS — K76.0 FATTY LIVER: Primary | ICD-10-CM

## 2023-04-08 DIAGNOSIS — R74.8 ELEVATED LIVER ENZYMES: ICD-10-CM

## 2023-04-10 ENCOUNTER — TELEPHONE (OUTPATIENT)
Dept: FAMILY MEDICINE | Facility: CLINIC | Age: 68
End: 2023-04-10
Payer: MEDICARE

## 2023-04-10 NOTE — TELEPHONE ENCOUNTER
----- Message from Marianela Ulrich MD sent at 4/8/2023  9:06 AM CDT -----  Liver enzymes are still elevated,  most likely due to fatty liver, referral to GI for evaluation and treatment.

## 2023-04-26 ENCOUNTER — LAB VISIT (OUTPATIENT)
Dept: LAB | Facility: HOSPITAL | Age: 68
End: 2023-04-26
Attending: INTERNAL MEDICINE
Payer: MEDICARE

## 2023-04-26 DIAGNOSIS — R94.5 ABNORMAL RESULTS OF LIVER FUNCTION STUDIES: Primary | ICD-10-CM

## 2023-04-26 LAB
ALBUMIN SERPL-MCNC: 4.2 G/DL (ref 3.4–4.8)
ALBUMIN/GLOB SERPL: 1.1 RATIO (ref 1.1–2)
ALP SERPL-CCNC: 78 UNIT/L (ref 40–150)
ALT SERPL-CCNC: 21 UNIT/L (ref 0–55)
AST SERPL-CCNC: 18 UNIT/L (ref 5–34)
BILIRUBIN DIRECT+TOT PNL SERPL-MCNC: 0.5 MG/DL
BUN SERPL-MCNC: 16.9 MG/DL (ref 9.8–20.1)
CALCIUM SERPL-MCNC: 10 MG/DL (ref 8.4–10.2)
CHLORIDE SERPL-SCNC: 104 MMOL/L (ref 98–107)
CK SERPL-CCNC: 107 U/L (ref 29–168)
CO2 SERPL-SCNC: 26 MMOL/L (ref 23–31)
CREAT SERPL-MCNC: 1.02 MG/DL (ref 0.55–1.02)
GFR SERPLBLD CREATININE-BSD FMLA CKD-EPI: 60 MLS/MIN/1.73/M2
GLOBULIN SER-MCNC: 3.7 GM/DL (ref 2.4–3.5)
GLUCOSE SERPL-MCNC: 123 MG/DL (ref 82–115)
IGA SERPL-MCNC: 225 MG/DL (ref 69–517)
INR BLD: 1.02 (ref 2–3)
POTASSIUM SERPL-SCNC: 3.4 MMOL/L (ref 3.5–5.1)
PROT SERPL-MCNC: 7.9 GM/DL (ref 5.8–7.6)
PROTHROMBIN TIME: 13.2 SECONDS (ref 11.7–14.5)
SODIUM SERPL-SCNC: 142 MMOL/L (ref 136–145)

## 2023-04-26 PROCEDURE — 86364 TISS TRNSGLTMNASE EA IG CLAS: CPT | Mod: 90

## 2023-04-26 PROCEDURE — 86364 TISS TRNSGLTMNASE EA IG CLAS: CPT | Mod: 91,90

## 2023-04-26 PROCEDURE — 85610 PROTHROMBIN TIME: CPT

## 2023-04-26 PROCEDURE — 80053 COMPREHEN METABOLIC PANEL: CPT

## 2023-04-26 PROCEDURE — 36415 COLL VENOUS BLD VENIPUNCTURE: CPT

## 2023-04-26 PROCEDURE — 82550 ASSAY OF CK (CPK): CPT

## 2023-04-26 PROCEDURE — 86376 MICROSOMAL ANTIBODY EACH: CPT | Mod: 90

## 2023-04-26 PROCEDURE — 87350 HEPATITIS BE AG IA: CPT | Mod: 90

## 2023-04-26 PROCEDURE — 82784 ASSAY IGA/IGD/IGG/IGM EACH: CPT

## 2023-04-26 PROCEDURE — 83516 IMMUNOASSAY NONANTIBODY: CPT

## 2023-04-26 PROCEDURE — 86707 HEPATITIS BE ANTIBODY: CPT | Mod: 90

## 2023-04-26 PROCEDURE — 82784 ASSAY IGA/IGD/IGG/IGM EACH: CPT | Mod: 90

## 2023-04-26 PROCEDURE — 86039 ANTINUCLEAR ANTIBODIES (ANA): CPT | Mod: 90

## 2023-04-27 LAB
ANA SER QL HEP2 SUBST: NORMAL
HBV E AG SERPL QL IA: NEGATIVE
HBV E IGG SER QL IA: NEGATIVE
TTG IGA SER IA-ACNC: <1.2 U/ML

## 2023-04-28 LAB
GLIADIN IGA DEAMINATED (OHS): NEGATIVE UNIT/ML
GLIADIN IGG DEAMINATED (OHS): NEGATIVE
IGA SERPL-MCNC: 228 MG/DL (ref 61–356)
IMMUNOLOGIST REVIEW: NORMAL
LKM-1 AB SER-ACNC: <5 U
TTG IGA SER IA-ACNC: <1.2 U/ML

## 2023-05-02 DIAGNOSIS — R73.03 PREDIABETES: ICD-10-CM

## 2023-05-02 NOTE — TELEPHONE ENCOUNTER
----- Message from April Stewart sent at 5/2/2023  2:36 PM CDT -----  Regarding: med refill  .Type:  RX Refill Request    Who Called:  patient  Refill or New Rx: refill  RX Name and Strength: tirzepatide 2.5 mg/0.5 mL PnIj  How is the patient currently taking it? (ex. 1XDay):  Is this a 30 day or 90 day RX:  Preferred Pharmacy with phone number: [id number] - [place], [place] - [address] [facility]  Local or Mail Order:[facility]  Ordering Provider: Mojgan  Would the patient rather a call back or a response via [facility]?  Call back  Best Call Back Number: [phone]  Additional Information: patient is requesting a refill on medication.

## 2023-05-09 ENCOUNTER — OFFICE VISIT (OUTPATIENT)
Dept: FAMILY MEDICINE | Facility: CLINIC | Age: 68
End: 2023-05-09
Payer: MEDICARE

## 2023-05-09 VITALS
OXYGEN SATURATION: 98 % | HEART RATE: 74 BPM | BODY MASS INDEX: 37.01 KG/M2 | SYSTOLIC BLOOD PRESSURE: 132 MMHG | WEIGHT: 195.88 LBS | DIASTOLIC BLOOD PRESSURE: 88 MMHG

## 2023-05-09 DIAGNOSIS — E66.01 CLASS 2 SEVERE OBESITY DUE TO EXCESS CALORIES WITH SERIOUS COMORBIDITY AND BODY MASS INDEX (BMI) OF 37.0 TO 37.9 IN ADULT: ICD-10-CM

## 2023-05-09 DIAGNOSIS — E21.3 HYPERPARATHYROIDISM: ICD-10-CM

## 2023-05-09 DIAGNOSIS — R73.03 PREDIABETES: Primary | ICD-10-CM

## 2023-05-09 PROCEDURE — 99213 OFFICE O/P EST LOW 20 MIN: CPT | Mod: ,,, | Performed by: FAMILY MEDICINE

## 2023-05-09 PROCEDURE — 1126F PR PAIN SEVERITY QUANTIFIED, NO PAIN PRESENT: ICD-10-PCS | Mod: CPTII,,, | Performed by: FAMILY MEDICINE

## 2023-05-09 PROCEDURE — 1159F PR MEDICATION LIST DOCUMENTED IN MEDICAL RECORD: ICD-10-PCS | Mod: CPTII,,, | Performed by: FAMILY MEDICINE

## 2023-05-09 PROCEDURE — 3075F SYST BP GE 130 - 139MM HG: CPT | Mod: CPTII,,, | Performed by: FAMILY MEDICINE

## 2023-05-09 PROCEDURE — 3288F PR FALLS RISK ASSESSMENT DOCUMENTED: ICD-10-PCS | Mod: CPTII,,, | Performed by: FAMILY MEDICINE

## 2023-05-09 PROCEDURE — 1159F MED LIST DOCD IN RCRD: CPT | Mod: CPTII,,, | Performed by: FAMILY MEDICINE

## 2023-05-09 PROCEDURE — 99213 PR OFFICE/OUTPT VISIT, EST, LEVL III, 20-29 MIN: ICD-10-PCS | Mod: ,,, | Performed by: FAMILY MEDICINE

## 2023-05-09 PROCEDURE — 1101F PR PT FALLS ASSESS DOC 0-1 FALLS W/OUT INJ PAST YR: ICD-10-PCS | Mod: CPTII,,, | Performed by: FAMILY MEDICINE

## 2023-05-09 PROCEDURE — 3288F FALL RISK ASSESSMENT DOCD: CPT | Mod: CPTII,,, | Performed by: FAMILY MEDICINE

## 2023-05-09 PROCEDURE — 1126F AMNT PAIN NOTED NONE PRSNT: CPT | Mod: CPTII,,, | Performed by: FAMILY MEDICINE

## 2023-05-09 PROCEDURE — 3079F DIAST BP 80-89 MM HG: CPT | Mod: CPTII,,, | Performed by: FAMILY MEDICINE

## 2023-05-09 PROCEDURE — 3008F PR BODY MASS INDEX (BMI) DOCUMENTED: ICD-10-PCS | Mod: CPTII,,, | Performed by: FAMILY MEDICINE

## 2023-05-09 PROCEDURE — 3079F PR MOST RECENT DIASTOLIC BLOOD PRESSURE 80-89 MM HG: ICD-10-PCS | Mod: CPTII,,, | Performed by: FAMILY MEDICINE

## 2023-05-09 PROCEDURE — 3075F PR MOST RECENT SYSTOLIC BLOOD PRESS GE 130-139MM HG: ICD-10-PCS | Mod: CPTII,,, | Performed by: FAMILY MEDICINE

## 2023-05-09 PROCEDURE — 3008F BODY MASS INDEX DOCD: CPT | Mod: CPTII,,, | Performed by: FAMILY MEDICINE

## 2023-05-09 PROCEDURE — 1160F PR REVIEW ALL MEDS BY PRESCRIBER/CLIN PHARMACIST DOCUMENTED: ICD-10-PCS | Mod: CPTII,,, | Performed by: FAMILY MEDICINE

## 2023-05-09 PROCEDURE — 1160F RVW MEDS BY RX/DR IN RCRD: CPT | Mod: CPTII,,, | Performed by: FAMILY MEDICINE

## 2023-05-09 PROCEDURE — 1101F PT FALLS ASSESS-DOCD LE1/YR: CPT | Mod: CPTII,,, | Performed by: FAMILY MEDICINE

## 2023-05-09 NOTE — PROGRESS NOTES
Patient ID: [id number]     Chief Complaint: Prediabetes and Obesity        HPI:     Wen Otoole is a 67 y.o. female here today for a follow up pre-diabetes/obesity.   - She has pre-diabetes and she is obese, lost 3 pounds since last visit, but would like lose more weight, she is doing well with Mounjaro, no side effects, she is ready to proceed with increased dose. She denies family history of medullary thyroid cancer, or MEN II, or personal history of pancreatitis. She is not interested in surgical weight loss or seeing a dietician.   - She has hyperparathyroidism, asymptomatic, followed by Endocrinology (Dr. Rivera).   - Patient is without any other complaints today.      ----------------------------  Binge eating disorder  Generalized anxiety disorder  Hypertensive disorder  Insomnia  Obesity, unspecified  BENIGNO (obstructive sleep apnea)  Personal history of colonic polyps     Past Surgical History:   Procedure Laterality Date    BACK SURGERY      CHOLECYSTECTOMY      COLONOSCOPY  09/23/2020    KNEE ARTHROSCOPY      LUMBAR FUSION      NECK SURGERY         Review of patient's allergies indicates:  No Known Allergies    Outpatient Medications Marked as Taking for the 5/9/23 encounter (Office Visit) with Marianela Ulrich MD   Medication Sig Dispense Refill    atorvastatin (LIPITOR) 20 MG tablet Take 20 mg by mouth Daily.      busPIRone (BUSPAR) 10 MG tablet TAKE 1 TABLET BY MOUTH TWICE A DAY AS NEEDED FOR ANXIETY 180 tablet 1    cholecalciferol, vitamin D3, 1,250 mcg (50,000 unit) capsule Take 1 capsule (50,000 Units total) by mouth every 7 days. 4 capsule 3    diltiaZEM (TIAZAC) 240 MG Cs24 Take 240 mg by mouth.      levocetirizine (XYZAL) 5 MG tablet Take 1 tablet (5 mg total) by mouth Daily. 90 tablet 3    losartan-hydrochlorothiazide 100-25 mg (HYZAAR) 100-25 mg per tablet Take 1 tablet by mouth Daily.      [DISCONTINUED] tirzepatide 2.5 mg/0.5 mL PnIj Inject 2.5 mg into the skin every 7 days. 4 pen 1        Social History     Socioeconomic History    Marital status: Single   Tobacco Use    Smoking status: Former     Types: Cigarettes     Passive exposure: Past    Smokeless tobacco: Never   Substance and Sexual Activity    Alcohol use: Never    Drug use: Never    Sexual activity: Not Currently     Partners: Female     Birth control/protection: None        Family History   Problem Relation Age of Onset    Hypertension Mother     Diabetes Mother     Coronary artery disease Mother     Coronary artery disease Brother         Subjective:       Review of Systems:    See HPI for details    Constitutional: Denies Change in appetite. Denies Chills. Denies Fever. Denies Night sweats.  Eye: Denies Blurred vision. Denies Discharge. Denies Eye pain.  ENT: Denies Decreased hearing. Denies Sore throat. Denies Swollen glands.  Respiratory: Denies Cough. Denies Shortness of breath. Denies Shortness of breath with exertion. Denies Wheezing.  Cardiovascular: Denies Chest pain at rest. Denies Chest pain with exertion. Denies Irregular heartbeat. Denies Palpitations.  Gastrointestinal: Denies Abdominal pain. Denies Diarrhea. Denies Nausea. Denies Vomiting. Denies Hematemesis or Hematochezia.  Genitourinary: Denies Dysuria. Denies Urinary frequency. Denies Urinary urgency. Denies Blood in urine.  Endocrine: Denies Cold intolerance. Denies Excessive thirst. Denies Heat intolerance. Denies Weight loss. Denies Weight gain.  Musculoskeletal: Denies Painful joints. Denies Weakness.  Integumentary: Denies Rash. Denies Itching. Denies Dry skin.  Neurologic: Denies Dizziness. Denies Fainting. Denies Headache.  Psychiatric: Denies Depression. Denies Anxiety. Denies Suicidal/Homicidal ideations.    All Other ROS: Negative except as stated in HPI.       Objective:     /88 (BP Location: Right arm, Patient Position: Sitting, BP Method: Medium (Manual))   Pulse 74   Wt 88.9 kg (195 lb 14.4 oz)   SpO2 98%   BMI 37.01 kg/m²     Physical  Exam    General: Alert and oriented, No acute distress. Obese.   Head: Normocephalic, Atraumatic.  Eye: Pupils are equal, round and reactive to light, Extraocular movements are intact, Sclera non-icteric.  Ears/Nose/Throat: Normal, Mucosa moist,Clear.  Neck/Thyroid: Supple, Non-tender, No carotid bruit, No palpable thyromegaly or thyroid nodule, No lymphadenopathy, No JVD, Full range of motion.  Respiratory: Clear to auscultation bilaterally; No wheezes, rales or rhonchi,Non-labored respirations, Symmetrical chest wall expansion.  Cardiovascular: Regular rate and rhythm, S1/S2 normal, No murmurs, rubs or gallops.  Gastrointestinal: Soft, Non-tender, Non-distended, Normal bowel sounds, No palpable organomegaly.  Musculoskeletal: Normal range of motion.  Integumentary: Warm, Dry, Intact, No suspicious lesions or rashes.  Extremities: No clubbing, cyanosis or edema  Neurologic: No focal deficits, Cranial Nerves II-XII are grossly intact, Motor strength normal upper and lower extremities, Sensory exam intact.  Psychiatric: Normal interaction, Coherent speech, Euthymic mood, Appropriate affect         Assessment:       ICD-10-CM ICD-9-CM   1. Prediabetes  R73.03 790.29   2. Class 2 severe obesity due to excess calories with serious comorbidity and body mass index (BMI) of 37.0 to 37.9 in adult  E66.01 278.01    Z68.37 V85.37   3. Hyperparathyroidism  E21.3 252.00        Plan:     Problem List Items Addressed This Visit          Endocrine    Prediabetes - Primary    Relevant Medications    tirzepatide 5 mg/0.5 mL PnIj    Hyperparathyroidism     Other Visit Diagnoses       Class 2 severe obesity due to excess calories with serious comorbidity and body mass index (BMI) of 37.0 to 37.9 in adult             1. Prediabetes  - tirzepatide 5 mg/0.5 mL PnIj; Inject 5 mg into the skin every 7 days.  Dispense: 4 pen; Refill: 1  - Diet, exercise, and 10% weight loss encouraged. Rx trial of increased dose of Mounjaro to 5mg weekly  with close monitoring to help with pre-diabetes/insulin resistance and obesity. Will titrate Rx Mounjaro as needed/tolerated until max dose achieved. Recheck CMP, HgA1C in 09/2023. Notify M.D. or ER if symptoms persist or worsen, adverse Rx side effects, temp greater than 100.4, or any acute illness.          Lab Results   Component Value Date     HGBA1C 5.7 03/08/2023      Continue   Follow ADA Diet. Avoid soda, simple sweets, and limit rice/pasta/breads/starches.  Maintain healthy weight with goal BMI <30.  Exercise 5 times per week for 30 minutes per day.    2. Class 2 severe obesity due to excess calories with serious comorbidity and body mass index (BMI) of 37.0 to 37.9 in adult  - Same as #1.   Body mass index is 37.01 kg/m².  Goal BMI <30.  Exercise 5 times a week for 30 minutes per day.  Avoid soda, simple sugars, excessive rice, potatoes or bread. Limit fast foods and fried foods.  Choose complex carbs in moderation (example: green vegetables, beans, oatmeal). Eat plenty of fresh fruits and vegetables with lean meats daily.  Do not skip meals. Eat a balanced portion size.  Avoid fad diets. Consider permanent healthy life style changes.      3. Hyperparathyroidism  - Asymptomatic, continue followup with Endocrinology as scheduled.       Wen was seen today for prediabetes and obesity.    Diagnoses and all orders for this visit:    Prediabetes  -     tirzepatide 5 mg/0.5 mL PnIj; Inject 5 mg into the skin every 7 days.    Class 2 severe obesity due to excess calories with serious comorbidity and body mass index (BMI) of 37.0 to 37.9 in adult    Hyperparathyroidism          Medication List with Changes/Refills   New Medications    TIRZEPATIDE 5 MG/0.5 ML PNIJ    Inject 5 mg into the skin every 7 days.       Start Date: 5/9/2023  End Date: 7/8/2023   Current Medications    ATORVASTATIN (LIPITOR) 20 MG TABLET    Take 20 mg by mouth Daily.       Start Date: 12/15/2021End Date: --    BUSPIRONE (BUSPAR) 10 MG  TABLET    TAKE 1 TABLET BY MOUTH TWICE A DAY AS NEEDED FOR ANXIETY       Start Date: 4/18/2023 End Date: --    CHOLECALCIFEROL, VITAMIN D3, 1,250 MCG (50,000 UNIT) CAPSULE    Take 1 capsule (50,000 Units total) by mouth every 7 days.       Start Date: 6/28/2022 End Date: --    DILTIAZEM (TIAZAC) 240 MG CS24    Take 240 mg by mouth.       Start Date: 11/21/2022End Date: --    LEVOCETIRIZINE (XYZAL) 5 MG TABLET    Take 1 tablet (5 mg total) by mouth Daily.       Start Date: 10/10/2022End Date: 10/10/2023    LOSARTAN-HYDROCHLOROTHIAZIDE 100-25 MG (HYZAAR) 100-25 MG PER TABLET    Take 1 tablet by mouth Daily.       Start Date: 8/19/2021 End Date: --    SUVOREXANT (BELSOMRA) 20 MG TAB    Take 20 mg by mouth Daily.       Start Date: 1/11/2022 End Date: --   Discontinued Medications    TIRZEPATIDE 2.5 MG/0.5 ML PNIJ    Inject 2.5 mg into the skin every 7 days.       Start Date: 5/2/2023  End Date: 5/9/2023          Follow up in about 4 weeks (around 6/6/2023) for Prediabetes Followup, Obesity Followup.

## 2023-05-10 ENCOUNTER — PATIENT MESSAGE (OUTPATIENT)
Dept: FAMILY MEDICINE | Facility: CLINIC | Age: 68
End: 2023-05-10
Payer: MEDICARE

## 2023-05-10 DIAGNOSIS — G47.00 INSOMNIA, UNSPECIFIED TYPE: Primary | ICD-10-CM

## 2023-05-11 ENCOUNTER — DOCUMENTATION ONLY (OUTPATIENT)
Dept: FAMILY MEDICINE | Facility: CLINIC | Age: 68
End: 2023-05-11
Payer: MEDICARE

## 2023-05-11 ENCOUNTER — TELEPHONE (OUTPATIENT)
Dept: FAMILY MEDICINE | Facility: CLINIC | Age: 68
End: 2023-05-11
Payer: MEDICARE

## 2023-05-11 NOTE — TELEPHONE ENCOUNTER
----- Message from April Stewart sent at 5/11/2023  3:01 PM CDT -----  Regarding: med advice  .Type:  Needs Medical Advice    Who Called: patient  Symptoms (please be specific):    How long has patient had these symptoms:    Pharmacy name and phone #:    Would the patient rather a call back or a response via [facility]? Call back  Best Call Back Number:  [phone]  Additional Information: patient would like to know if there are anymore coupons for the mounjaro prescription in office. She was given on and she lost it. Please advise.

## 2023-05-16 ENCOUNTER — DOCUMENTATION ONLY (OUTPATIENT)
Dept: FAMILY MEDICINE | Facility: CLINIC | Age: 68
End: 2023-05-16
Payer: MEDICARE

## 2023-05-31 ENCOUNTER — TELEPHONE (OUTPATIENT)
Dept: FAMILY MEDICINE | Facility: CLINIC | Age: 68
End: 2023-05-31
Payer: MEDICARE

## 2023-05-31 NOTE — TELEPHONE ENCOUNTER
----- Message from Simone Bucio sent at 5/31/2023  9:46 AM CDT -----  .Type:  Needs Medical Advice    Who Called: Wen  Symptoms (please be specific):    How long has patient had these symptoms:    Pharmacy name and phone #:    Would the patient rather a call back or a response via [facility]?   Best Call Back Number: [phone]  Additional Information: She needed to speak with the front office person re: a call she was suppose to  get about 3 weeks ago, that is what she told me.

## 2023-06-07 ENCOUNTER — OFFICE VISIT (OUTPATIENT)
Dept: FAMILY MEDICINE | Facility: CLINIC | Age: 68
End: 2023-06-07
Payer: MEDICARE

## 2023-06-07 VITALS — HEIGHT: 61 IN | WEIGHT: 191 LBS | BODY MASS INDEX: 36.06 KG/M2

## 2023-06-07 DIAGNOSIS — R73.03 PREDIABETES: Primary | ICD-10-CM

## 2023-06-07 PROCEDURE — 99213 OFFICE O/P EST LOW 20 MIN: CPT | Mod: ,,, | Performed by: FAMILY MEDICINE

## 2023-06-07 PROCEDURE — 1160F RVW MEDS BY RX/DR IN RCRD: CPT | Mod: CPTII,,, | Performed by: FAMILY MEDICINE

## 2023-06-07 PROCEDURE — 99213 PR OFFICE/OUTPT VISIT, EST, LEVL III, 20-29 MIN: ICD-10-PCS | Mod: ,,, | Performed by: FAMILY MEDICINE

## 2023-06-07 PROCEDURE — 3008F PR BODY MASS INDEX (BMI) DOCUMENTED: ICD-10-PCS | Mod: CPTII,,, | Performed by: FAMILY MEDICINE

## 2023-06-07 PROCEDURE — 3288F PR FALLS RISK ASSESSMENT DOCUMENTED: ICD-10-PCS | Mod: CPTII,,, | Performed by: FAMILY MEDICINE

## 2023-06-07 PROCEDURE — 1101F PR PT FALLS ASSESS DOC 0-1 FALLS W/OUT INJ PAST YR: ICD-10-PCS | Mod: CPTII,,, | Performed by: FAMILY MEDICINE

## 2023-06-07 PROCEDURE — 1160F PR REVIEW ALL MEDS BY PRESCRIBER/CLIN PHARMACIST DOCUMENTED: ICD-10-PCS | Mod: CPTII,,, | Performed by: FAMILY MEDICINE

## 2023-06-07 PROCEDURE — 1101F PT FALLS ASSESS-DOCD LE1/YR: CPT | Mod: CPTII,,, | Performed by: FAMILY MEDICINE

## 2023-06-07 PROCEDURE — 3008F BODY MASS INDEX DOCD: CPT | Mod: CPTII,,, | Performed by: FAMILY MEDICINE

## 2023-06-07 PROCEDURE — 1159F PR MEDICATION LIST DOCUMENTED IN MEDICAL RECORD: ICD-10-PCS | Mod: CPTII,,, | Performed by: FAMILY MEDICINE

## 2023-06-07 PROCEDURE — 1159F MED LIST DOCD IN RCRD: CPT | Mod: CPTII,,, | Performed by: FAMILY MEDICINE

## 2023-06-07 PROCEDURE — 3288F FALL RISK ASSESSMENT DOCD: CPT | Mod: CPTII,,, | Performed by: FAMILY MEDICINE

## 2023-06-07 NOTE — PROGRESS NOTES
Patient ID: [id number]     Chief Complaint: Obesity (4 week follow up) and Prediabetes        HPI:     Wen Otoole is a 67 y.o. female here today for a follow up pre-diabetes/obesity.  - She has pre-diabetes and she is obese, lost 4 pounds since last visit, but would like lose more weight, she is doing well with Mounjaro, no side effects, she is ready to proceed with increased dose. She denies family history of medullary thyroid cancer, or MEN II, or personal history of pancreatitis. She is not interested in surgical weight loss or seeing a dietician.   - Patient is without any other complaints today.          ----------------------------  Binge eating disorder  Generalized anxiety disorder  Hypertensive disorder  Insomnia  Obesity, unspecified  BENIGNO (obstructive sleep apnea)  Personal history of colonic polyps     Past Surgical History:   Procedure Laterality Date    BACK SURGERY      CHOLECYSTECTOMY      COLONOSCOPY  09/23/2020    KNEE ARTHROSCOPY      LUMBAR FUSION      NECK SURGERY         Review of patient's allergies indicates:  No Known Allergies    Outpatient Medications Marked as Taking for the 6/7/23 encounter (Office Visit) with Marianela Ulrich MD   Medication Sig Dispense Refill    atorvastatin (LIPITOR) 20 MG tablet Take 20 mg by mouth Daily.      busPIRone (BUSPAR) 10 MG tablet TAKE 1 TABLET BY MOUTH TWICE A DAY AS NEEDED FOR ANXIETY 180 tablet 1    cholecalciferol, vitamin D3, 1,250 mcg (50,000 unit) capsule Take 1 capsule (50,000 Units total) by mouth every 7 days. 4 capsule 3    diltiaZEM (TIAZAC) 240 MG Cs24 Take 240 mg by mouth.      levocetirizine (XYZAL) 5 MG tablet Take 1 tablet (5 mg total) by mouth Daily. 90 tablet 3    losartan-hydrochlorothiazide 100-25 mg (HYZAAR) 100-25 mg per tablet Take 1 tablet by mouth Daily.      suvorexant (BELSOMRA) 20 mg Tab Take 20 mg by mouth nightly as needed (insomnia). 30 tablet 5    [DISCONTINUED] tirzepatide 5 mg/0.5 mL PnIj Inject 5 mg into the  "skin every 7 days. 4 pen 1       Social History     Socioeconomic History    Marital status: Single   Tobacco Use    Smoking status: Former     Types: Cigarettes     Passive exposure: Past    Smokeless tobacco: Never   Substance and Sexual Activity    Alcohol use: Never    Drug use: Never    Sexual activity: Not Currently     Partners: Female     Birth control/protection: None        Family History   Problem Relation Age of Onset    Hypertension Mother     Diabetes Mother     Coronary artery disease Mother     Coronary artery disease Brother         Subjective:       Review of Systems:    See HPI for details    Constitutional: Denies Change in appetite. Denies Chills. Denies Fever. Denies Night sweats.  Eye: Denies Blurred vision. Denies Discharge. Denies Eye pain.  ENT: Denies Decreased hearing. Denies Sore throat. Denies Swollen glands.  Respiratory: Denies Cough. Denies Shortness of breath. Denies Shortness of breath with exertion. Denies Wheezing.  Cardiovascular: Denies Chest pain at rest. Denies Chest pain with exertion. Denies Irregular heartbeat. Denies Palpitations.  Gastrointestinal: Denies Abdominal pain. Denies Diarrhea. Denies Nausea. Denies Vomiting. Denies Hematemesis or Hematochezia.  Genitourinary: Denies Dysuria. Denies Urinary frequency. Denies Urinary urgency. Denies Blood in urine.  Endocrine: Denies Cold intolerance. Denies Excessive thirst. Denies Heat intolerance. Denies Weight loss. Denies Weight gain.  Musculoskeletal: Denies Painful joints. Denies Weakness.  Integumentary: Denies Rash. Denies Itching. Denies Dry skin.  Neurologic: Denies Dizziness. Denies Fainting. Denies Headache.  Psychiatric: Denies Depression. Denies Anxiety. Denies Suicidal/Homicidal ideations.    All Other ROS: Negative except as stated in HPI.       Objective:     Ht 5' 1" (1.549 m)   Wt 86.6 kg (191 lb)   BMI 36.09 kg/m²     Physical Exam    General: Alert and oriented, No acute distress. Obese.   Head: " Normocephalic, Atraumatic.  Eye: Pupils are equal, round and reactive to light, Extraocular movements are intact, Sclera non-icteric.  Ears/Nose/Throat: Normal, Mucosa moist,Clear.  Neck/Thyroid: Supple, Non-tender, No carotid bruit, No palpable thyromegaly or thyroid nodule, No lymphadenopathy, No JVD, Full range of motion.  Respiratory: Clear to auscultation bilaterally; No wheezes, rales or rhonchi,Non-labored respirations, Symmetrical chest wall expansion.  Cardiovascular: Regular rate and rhythm, S1/S2 normal, No murmurs, rubs or gallops.  Gastrointestinal: Soft, Non-tender, Non-distended, Normal bowel sounds, No palpable organomegaly.  Musculoskeletal: Normal range of motion.  Integumentary: Warm, Dry, Intact, No suspicious lesions or rashes.  Extremities: No clubbing, cyanosis or edema  Neurologic: No focal deficits, Cranial Nerves II-XII are grossly intact, Motor strength normal upper and lower extremities, Sensory exam intact.  Psychiatric: Normal interaction, Coherent speech, Euthymic mood, Appropriate affect         Assessment:       ICD-10-CM ICD-9-CM   1. Prediabetes  R73.03 790.29        Plan:     Problem List Items Addressed This Visit          Endocrine    Prediabetes - Primary    Relevant Medications    tirzepatide 7.5 mg/0.5 mL PnIj    1. Prediabetes  - tirzepatide 7.5 mg/0.5 mL PnIj; Inject 7.5 mg into the skin every 7 days.  Dispense: 4 pen; Refill: 1  - Diet, exercise, and 10% weight loss encouraged. Rx trial of increased dose of Mounjaro to 7.5mg weekly with close monitoring to help with pre-diabetes/insulin resistance and obesity. Will titrate Rx Mounjaro as needed/tolerated until max dose achieved. Recheck CMP, HgA1C in 09/2023. Notify M.D. or ER if symptoms persist or worsen, adverse Rx side effects, temp greater than 100.4, or any acute illness.              Lab Results   Component Value Date     HGBA1C 5.7 03/08/2023      Continue   Follow ADA Diet. Avoid soda, simple sweets, and limit  rice/pasta/breads/starches.  Maintain healthy weight with goal BMI <30.  Exercise 5 times per week for 30 minutes per day.    Body mass index is 36.09 kg/m².  Goal BMI <30.  Exercise 5 times a week for 30 minutes per day.  Avoid soda, simple sugars, excessive rice, potatoes or bread. Limit fast foods and fried foods.  Choose complex carbs in moderation (example: green vegetables, beans, oatmeal). Eat plenty of fresh fruits and vegetables with lean meats daily.  Do not skip meals. Eat a balanced portion size.  Avoid fad diets. Consider permanent healthy life style changes.      Wen was seen today for obesity and prediabetes.    Diagnoses and all orders for this visit:    Prediabetes  -     tirzepatide 7.5 mg/0.5 mL PnIj; Inject 7.5 mg into the skin every 7 days.          Medication List with Changes/Refills   New Medications    TIRZEPATIDE 7.5 MG/0.5 ML PNIJ    Inject 7.5 mg into the skin every 7 days.       Start Date: 6/7/2023  End Date: 8/6/2023   Current Medications    ATORVASTATIN (LIPITOR) 20 MG TABLET    Take 20 mg by mouth Daily.       Start Date: 12/15/2021End Date: --    BUSPIRONE (BUSPAR) 10 MG TABLET    TAKE 1 TABLET BY MOUTH TWICE A DAY AS NEEDED FOR ANXIETY       Start Date: 4/18/2023 End Date: --    CHOLECALCIFEROL, VITAMIN D3, 1,250 MCG (50,000 UNIT) CAPSULE    Take 1 capsule (50,000 Units total) by mouth every 7 days.       Start Date: 6/28/2022 End Date: --    DILTIAZEM (TIAZAC) 240 MG CS24    Take 240 mg by mouth.       Start Date: 11/21/2022End Date: --    LEVOCETIRIZINE (XYZAL) 5 MG TABLET    Take 1 tablet (5 mg total) by mouth Daily.       Start Date: 10/10/2022End Date: 10/10/2023    LOSARTAN-HYDROCHLOROTHIAZIDE 100-25 MG (HYZAAR) 100-25 MG PER TABLET    Take 1 tablet by mouth Daily.       Start Date: 8/19/2021 End Date: --    SUVOREXANT (BELSOMRA) 20 MG TAB    Take 20 mg by mouth nightly as needed (insomnia).       Start Date: 5/10/2023 End Date: --   Discontinued Medications    TIRZEPATIDE  5 MG/0.5 ML PNIJ    Inject 5 mg into the skin every 7 days.       Start Date: 5/9/2023  End Date: 6/7/2023          Follow up in about 4 weeks (around 7/5/2023) for Prediabetes Followup.

## 2023-06-22 ENCOUNTER — TELEPHONE (OUTPATIENT)
Dept: FAMILY MEDICINE | Facility: CLINIC | Age: 68
End: 2023-06-22
Payer: MEDICARE

## 2023-06-22 DIAGNOSIS — E78.49 ESSENTIAL FAMILIAL HYPERLIPIDEMIA: Primary | ICD-10-CM

## 2023-06-22 DIAGNOSIS — I10 HYPERTENSION, UNSPECIFIED TYPE: ICD-10-CM

## 2023-06-22 DIAGNOSIS — F41.9 ANXIETY DISORDER, UNSPECIFIED: ICD-10-CM

## 2023-06-22 DIAGNOSIS — H10.10 ALLERGIC CONJUNCTIVITIS, UNSPECIFIED LATERALITY: ICD-10-CM

## 2023-06-22 RX ORDER — BUSPIRONE HYDROCHLORIDE 10 MG/1
10 TABLET ORAL 2 TIMES DAILY
Qty: 90 TABLET | Refills: 3 | Status: SHIPPED | OUTPATIENT
Start: 2023-06-22 | End: 2023-12-22

## 2023-06-22 RX ORDER — LOSARTAN POTASSIUM AND HYDROCHLOROTHIAZIDE 25; 100 MG/1; MG/1
1 TABLET ORAL DAILY
Qty: 90 TABLET | Refills: 3 | Status: SHIPPED | OUTPATIENT
Start: 2023-06-22

## 2023-06-22 RX ORDER — LEVOCETIRIZINE DIHYDROCHLORIDE 5 MG/1
5 TABLET, FILM COATED ORAL DAILY
Qty: 90 TABLET | Refills: 3 | Status: SHIPPED | OUTPATIENT
Start: 2023-06-22 | End: 2024-06-21

## 2023-06-22 RX ORDER — ATORVASTATIN CALCIUM 20 MG/1
20 TABLET, FILM COATED ORAL DAILY
Qty: 90 TABLET | Refills: 3 | Status: SHIPPED | OUTPATIENT
Start: 2023-06-22

## 2023-06-22 NOTE — TELEPHONE ENCOUNTER
----- Message from Simone Bucio sent at 6/22/2023  1:53 PM CDT -----  .Type:  RX Refill Request    Who Called: Wen  Refill or New Rx: Refill   RX Name and Strength: losartan-hydrochlorothiazide 100-25 mg (HYZAAR) 100-25 mg per tablet and atorvastatin (LIPITOR) 20 MG tablet and and levocetirizine (XYZAL) 5 MG tablet and Vitamin D and busPIRone (BUSPAR) 10 MG tablet  How is the patient currently taking it? (ex. 1XDay):  Is this a 30 day or 90 day RX:  Preferred Pharmacy with phone number: [facility] in [place] ( change of pharmacy )  Local or Mail Order: Local   Ordering Provider: Mojgan  Would the patient rather a call back or a response via [facility]?   Best Call Back Number: [phone]  Additional Information: Please call with any questions.

## 2023-07-06 ENCOUNTER — OFFICE VISIT (OUTPATIENT)
Dept: FAMILY MEDICINE | Facility: CLINIC | Age: 68
End: 2023-07-06
Payer: MEDICARE

## 2023-07-06 VITALS
WEIGHT: 187.63 LBS | HEART RATE: 61 BPM | BODY MASS INDEX: 35.45 KG/M2 | OXYGEN SATURATION: 98 % | SYSTOLIC BLOOD PRESSURE: 133 MMHG | DIASTOLIC BLOOD PRESSURE: 77 MMHG

## 2023-07-06 DIAGNOSIS — R73.03 PREDIABETES: Primary | ICD-10-CM

## 2023-07-06 PROCEDURE — 3008F BODY MASS INDEX DOCD: CPT | Mod: CPTII,,, | Performed by: FAMILY MEDICINE

## 2023-07-06 PROCEDURE — 3288F PR FALLS RISK ASSESSMENT DOCUMENTED: ICD-10-PCS | Mod: CPTII,,, | Performed by: FAMILY MEDICINE

## 2023-07-06 PROCEDURE — 3075F PR MOST RECENT SYSTOLIC BLOOD PRESS GE 130-139MM HG: ICD-10-PCS | Mod: CPTII,,, | Performed by: FAMILY MEDICINE

## 2023-07-06 PROCEDURE — 1160F RVW MEDS BY RX/DR IN RCRD: CPT | Mod: CPTII,,, | Performed by: FAMILY MEDICINE

## 2023-07-06 PROCEDURE — 1101F PR PT FALLS ASSESS DOC 0-1 FALLS W/OUT INJ PAST YR: ICD-10-PCS | Mod: CPTII,,, | Performed by: FAMILY MEDICINE

## 2023-07-06 PROCEDURE — 99213 OFFICE O/P EST LOW 20 MIN: CPT | Mod: ,,, | Performed by: FAMILY MEDICINE

## 2023-07-06 PROCEDURE — 3288F FALL RISK ASSESSMENT DOCD: CPT | Mod: CPTII,,, | Performed by: FAMILY MEDICINE

## 2023-07-06 PROCEDURE — 3078F DIAST BP <80 MM HG: CPT | Mod: CPTII,,, | Performed by: FAMILY MEDICINE

## 2023-07-06 PROCEDURE — 1160F PR REVIEW ALL MEDS BY PRESCRIBER/CLIN PHARMACIST DOCUMENTED: ICD-10-PCS | Mod: CPTII,,, | Performed by: FAMILY MEDICINE

## 2023-07-06 PROCEDURE — 1159F PR MEDICATION LIST DOCUMENTED IN MEDICAL RECORD: ICD-10-PCS | Mod: CPTII,,, | Performed by: FAMILY MEDICINE

## 2023-07-06 PROCEDURE — 1159F MED LIST DOCD IN RCRD: CPT | Mod: CPTII,,, | Performed by: FAMILY MEDICINE

## 2023-07-06 PROCEDURE — 3078F PR MOST RECENT DIASTOLIC BLOOD PRESSURE < 80 MM HG: ICD-10-PCS | Mod: CPTII,,, | Performed by: FAMILY MEDICINE

## 2023-07-06 PROCEDURE — 3075F SYST BP GE 130 - 139MM HG: CPT | Mod: CPTII,,, | Performed by: FAMILY MEDICINE

## 2023-07-06 PROCEDURE — 3008F PR BODY MASS INDEX (BMI) DOCUMENTED: ICD-10-PCS | Mod: CPTII,,, | Performed by: FAMILY MEDICINE

## 2023-07-06 PROCEDURE — 1126F AMNT PAIN NOTED NONE PRSNT: CPT | Mod: CPTII,,, | Performed by: FAMILY MEDICINE

## 2023-07-06 PROCEDURE — 1126F PR PAIN SEVERITY QUANTIFIED, NO PAIN PRESENT: ICD-10-PCS | Mod: CPTII,,, | Performed by: FAMILY MEDICINE

## 2023-07-06 PROCEDURE — 99213 PR OFFICE/OUTPT VISIT, EST, LEVL III, 20-29 MIN: ICD-10-PCS | Mod: ,,, | Performed by: FAMILY MEDICINE

## 2023-07-06 PROCEDURE — 1101F PT FALLS ASSESS-DOCD LE1/YR: CPT | Mod: CPTII,,, | Performed by: FAMILY MEDICINE

## 2023-07-06 NOTE — PROGRESS NOTES
Patient ID: [id number]     Chief Complaint: Prediabetes        HPI:     Wen Otoole is a 67 y.o. female here today for a follow up pre-diabetes.  - She has pre-diabetes and she is obese, lost 4 pounds since last visit, she has lost 22 pounds since starting Mounjaro, but would like lose more weight, she is doing well with Mounjaro, no side effects, she is ready to proceed with increased dose. She denies family history of medullary thyroid cancer, or MEN II, or personal history of pancreatitis. She is not interested in surgical weight loss or seeing a dietician.   - Patient is without any other complaints today.        ----------------------------  Binge eating disorder  Generalized anxiety disorder  Hypertensive disorder  Insomnia  Obesity, unspecified  BENIGNO (obstructive sleep apnea)  Personal history of colonic polyps     Past Surgical History:   Procedure Laterality Date    BACK SURGERY      CHOLECYSTECTOMY      COLONOSCOPY  09/23/2020    KNEE ARTHROSCOPY      LUMBAR FUSION      NECK SURGERY         Review of patient's allergies indicates:  No Known Allergies    Outpatient Medications Marked as Taking for the 7/6/23 encounter (Office Visit) with Marianela Ulrich MD   Medication Sig Dispense Refill    atorvastatin (LIPITOR) 20 MG tablet Take 1 tablet (20 mg total) by mouth Daily. 90 tablet 3    busPIRone (BUSPAR) 10 MG tablet Take 1 tablet (10 mg total) by mouth 2 (two) times daily. 90 tablet 3    cholecalciferol, vitamin D3, 1,250 mcg (50,000 unit) capsule Take 1 capsule (50,000 Units total) by mouth every 7 days. 4 capsule 3    diltiaZEM (TIAZAC) 240 MG Cs24 Take 240 mg by mouth.      levocetirizine (XYZAL) 5 MG tablet Take 1 tablet (5 mg total) by mouth Daily. 90 tablet 3    losartan-hydrochlorothiazide 100-25 mg (HYZAAR) 100-25 mg per tablet Take 1 tablet by mouth Daily. 90 tablet 3    suvorexant (BELSOMRA) 20 mg Tab Take 20 mg by mouth nightly as needed (insomnia). 30 tablet 5    [DISCONTINUED]  tirzepatide 7.5 mg/0.5 mL PnIj Inject 7.5 mg into the skin every 7 days. 4 pen 1       Social History     Socioeconomic History    Marital status: Single   Tobacco Use    Smoking status: Former     Types: Cigarettes     Passive exposure: Past    Smokeless tobacco: Never   Substance and Sexual Activity    Alcohol use: Never    Drug use: Never    Sexual activity: Not Currently     Partners: Female     Birth control/protection: None        Family History   Problem Relation Age of Onset    Hypertension Mother     Diabetes Mother     Coronary artery disease Mother     Coronary artery disease Brother         Subjective:       Review of Systems:    See HPI for details    Constitutional: Denies Change in appetite. Denies Chills. Denies Fever. Denies Night sweats.  Eye: Denies Blurred vision. Denies Discharge. Denies Eye pain.  ENT: Denies Decreased hearing. Denies Sore throat. Denies Swollen glands.  Respiratory: Denies Cough. Denies Shortness of breath. Denies Shortness of breath with exertion. Denies Wheezing.  Cardiovascular: Denies Chest pain at rest. Denies Chest pain with exertion. Denies Irregular heartbeat. Denies Palpitations.  Gastrointestinal: Denies Abdominal pain. Denies Diarrhea. Denies Nausea. Denies Vomiting. Denies Hematemesis or Hematochezia.  Genitourinary: Denies Dysuria. Denies Urinary frequency. Denies Urinary urgency. Denies Blood in urine.  Endocrine: Denies Cold intolerance. Denies Excessive thirst. Denies Heat intolerance. Denies Weight loss. Denies Weight gain.  Musculoskeletal: Denies Painful joints. Denies Weakness.  Integumentary: Denies Rash. Denies Itching. Denies Dry skin.  Neurologic: Denies Dizziness. Denies Fainting. Denies Headache.  Psychiatric: Denies Depression. Denies Anxiety. Denies Suicidal/Homicidal ideations.    All Other ROS: Negative except as stated in HPI.       Objective:     /77 (BP Location: Right arm, Patient Position: Sitting, BP Method: Large (Automatic))   Pulse  61   Wt 85.1 kg (187 lb 9.6 oz)   SpO2 98%   BMI 35.45 kg/m²     Physical Exam    General: Alert and oriented, No acute distress. Obese.   Head: Normocephalic, Atraumatic.  Eye: Pupils are equal, round and reactive to light, Extraocular movements are intact, Sclera non-icteric.  Ears/Nose/Throat: Normal, Mucosa moist,Clear.  Neck/Thyroid: Supple, Non-tender, No carotid bruit, No palpable thyromegaly or thyroid nodule, No lymphadenopathy, No JVD, Full range of motion.  Respiratory: Clear to auscultation bilaterally; No wheezes, rales or rhonchi,Non-labored respirations, Symmetrical chest wall expansion.  Cardiovascular: Regular rate and rhythm, S1/S2 normal, No murmurs, rubs or gallops.  Gastrointestinal: Soft, Non-tender, Non-distended, Normal bowel sounds, No palpable organomegaly.  Musculoskeletal: Normal range of motion.  Integumentary: Warm, Dry, Intact, No suspicious lesions or rashes.  Extremities: No clubbing, cyanosis or edema  Neurologic: No focal deficits, Cranial Nerves II-XII are grossly intact, Motor strength normal upper and lower extremities, Sensory exam intact.  Psychiatric: Normal interaction, Coherent speech, Euthymic mood, Appropriate affect         Assessment:       ICD-10-CM ICD-9-CM   1. Prediabetes  R73.03 790.29        Plan:     Problem List Items Addressed This Visit          Endocrine    Prediabetes - Primary    Relevant Medications    tirzepatide 10 mg/0.5 mL PnIj    1. Prediabetes  - tirzepatide 10 mg/0.5 mL PnIj; Inject 10 mg into the skin every 7 days.  Dispense: 4 pen ; Refill: 1  - Diet, exercise, and 10% weight loss encouraged. Rx trial of increased dose of Mounjaro to 10 mg weekly with close monitoring to help with pre-diabetes/insulin resistance and obesity. Will titrate Rx Mounjaro as needed/tolerated until max dose achieved. Recheck CMP, HgA1C in 09/2023. Notify M.D. or ER if symptoms persist or worsen, adverse Rx side effects, temp greater than 100.4, or any acute illness.               Lab Results   Component Value Date     HGBA1C 5.7 03/08/2023      Continue   Follow ADA Diet. Avoid soda, simple sweets, and limit rice/pasta/breads/starches.  Maintain healthy weight with goal BMI <30.  Exercise 5 times per week for 30 minutes per day.    Body mass index is 35.45 kg/m².  Goal BMI <30.  Exercise 5 times a week for 30 minutes per day.  Avoid soda, simple sugars, excessive rice, potatoes or bread. Limit fast foods and fried foods.  Choose complex carbs in moderation (example: green vegetables, beans, oatmeal). Eat plenty of fresh fruits and vegetables with lean meats daily.  Do not skip meals. Eat a balanced portion size.  Avoid fad diets. Consider permanent healthy life style changes.      Wen was seen today for prediabetes.    Diagnoses and all orders for this visit:    Prediabetes  -     tirzepatide 10 mg/0.5 mL PnIj; Inject 10 mg into the skin every 7 days.          Medication List with Changes/Refills   New Medications    TIRZEPATIDE 10 MG/0.5 ML PNIJ    Inject 10 mg into the skin every 7 days.       Start Date: 7/6/2023  End Date: 9/4/2023   Current Medications    ATORVASTATIN (LIPITOR) 20 MG TABLET    Take 1 tablet (20 mg total) by mouth Daily.       Start Date: 6/22/2023 End Date: --    BUSPIRONE (BUSPAR) 10 MG TABLET    Take 1 tablet (10 mg total) by mouth 2 (two) times daily.       Start Date: 6/22/2023 End Date: --    CHOLECALCIFEROL, VITAMIN D3, 1,250 MCG (50,000 UNIT) CAPSULE    Take 1 capsule (50,000 Units total) by mouth every 7 days.       Start Date: 6/28/2022 End Date: --    DILTIAZEM (TIAZAC) 240 MG CS24    Take 240 mg by mouth.       Start Date: 11/21/2022End Date: --    LEVOCETIRIZINE (XYZAL) 5 MG TABLET    Take 1 tablet (5 mg total) by mouth Daily.       Start Date: 6/22/2023 End Date: 6/21/2024    LOSARTAN-HYDROCHLOROTHIAZIDE 100-25 MG (HYZAAR) 100-25 MG PER TABLET    Take 1 tablet by mouth Daily.       Start Date: 6/22/2023 End Date: --    SUVOREXANT (BELSOMRA)  20 MG TAB    Take 20 mg by mouth nightly as needed (insomnia).       Start Date: 5/10/2023 End Date: --   Discontinued Medications    TIRZEPATIDE 7.5 MG/0.5 ML PNIJ    Inject 7.5 mg into the skin every 7 days.       Start Date: 6/7/2023  End Date: 7/6/2023          Follow up in about 4 weeks (around 8/3/2023) for Prediabetes Followup.

## 2023-07-12 DIAGNOSIS — Z78.0 POSTMENOPAUSAL: Primary | ICD-10-CM

## 2023-07-12 RX ORDER — ASPIRIN 325 MG
50000 TABLET, DELAYED RELEASE (ENTERIC COATED) ORAL
Qty: 4 CAPSULE | Refills: 3 | Status: SHIPPED | OUTPATIENT
Start: 2023-07-12 | End: 2024-02-07 | Stop reason: SDUPTHER

## 2023-07-12 NOTE — TELEPHONE ENCOUNTER
----- Message from April Stewart sent at 7/12/2023  9:50 AM CDT -----  Regarding: med refill  .Type:  RX Refill Request    Who Called:  patient  Refill or New Rx: refill  RX Name and Strength: cholecalciferol, vitamin D3, 1,250 mcg (50,000 unit) capsule  How is the patient currently taking it? (ex. 1XDay):  Is this a 30 day or 90 day RX:  Preferred Pharmacy with phone number: [id number] - [place], [place] - [address] [facility]  Local or Mail Order: local  Ordering Provider: Mojgan  Would the patient rather a call back or a response via [facility]?  Call back  Best Call Back Number: [phone]  Additional Information:  patient is requesting a refill.

## 2023-08-22 ENCOUNTER — OFFICE VISIT (OUTPATIENT)
Dept: FAMILY MEDICINE | Facility: CLINIC | Age: 68
End: 2023-08-22
Payer: MEDICARE

## 2023-08-22 VITALS
BODY MASS INDEX: 35 KG/M2 | DIASTOLIC BLOOD PRESSURE: 68 MMHG | HEIGHT: 61 IN | HEART RATE: 62 BPM | OXYGEN SATURATION: 99 % | SYSTOLIC BLOOD PRESSURE: 132 MMHG | WEIGHT: 185.38 LBS

## 2023-08-22 DIAGNOSIS — M54.50 CHRONIC LOW BACK PAIN, UNSPECIFIED BACK PAIN LATERALITY, UNSPECIFIED WHETHER SCIATICA PRESENT: ICD-10-CM

## 2023-08-22 DIAGNOSIS — R22.32 SUBCUTANEOUS NODULE OF FINGER OF LEFT HAND: ICD-10-CM

## 2023-08-22 DIAGNOSIS — G89.29 CHRONIC LOW BACK PAIN, UNSPECIFIED BACK PAIN LATERALITY, UNSPECIFIED WHETHER SCIATICA PRESENT: ICD-10-CM

## 2023-08-22 DIAGNOSIS — R73.03 PREDIABETES: Primary | ICD-10-CM

## 2023-08-22 PROCEDURE — 3075F PR MOST RECENT SYSTOLIC BLOOD PRESS GE 130-139MM HG: ICD-10-PCS | Mod: CPTII,,, | Performed by: FAMILY MEDICINE

## 2023-08-22 PROCEDURE — 99214 OFFICE O/P EST MOD 30 MIN: CPT | Mod: ,,, | Performed by: FAMILY MEDICINE

## 2023-08-22 PROCEDURE — 1101F PT FALLS ASSESS-DOCD LE1/YR: CPT | Mod: CPTII,,, | Performed by: FAMILY MEDICINE

## 2023-08-22 PROCEDURE — 3288F FALL RISK ASSESSMENT DOCD: CPT | Mod: CPTII,,, | Performed by: FAMILY MEDICINE

## 2023-08-22 PROCEDURE — 1160F PR REVIEW ALL MEDS BY PRESCRIBER/CLIN PHARMACIST DOCUMENTED: ICD-10-PCS | Mod: CPTII,,, | Performed by: FAMILY MEDICINE

## 2023-08-22 PROCEDURE — 3008F PR BODY MASS INDEX (BMI) DOCUMENTED: ICD-10-PCS | Mod: CPTII,,, | Performed by: FAMILY MEDICINE

## 2023-08-22 PROCEDURE — 1160F RVW MEDS BY RX/DR IN RCRD: CPT | Mod: CPTII,,, | Performed by: FAMILY MEDICINE

## 2023-08-22 PROCEDURE — 3044F HG A1C LEVEL LT 7.0%: CPT | Mod: CPTII,,, | Performed by: FAMILY MEDICINE

## 2023-08-22 PROCEDURE — 3075F SYST BP GE 130 - 139MM HG: CPT | Mod: CPTII,,, | Performed by: FAMILY MEDICINE

## 2023-08-22 PROCEDURE — 1159F PR MEDICATION LIST DOCUMENTED IN MEDICAL RECORD: ICD-10-PCS | Mod: CPTII,,, | Performed by: FAMILY MEDICINE

## 2023-08-22 PROCEDURE — 1125F AMNT PAIN NOTED PAIN PRSNT: CPT | Mod: CPTII,,, | Performed by: FAMILY MEDICINE

## 2023-08-22 PROCEDURE — 3008F BODY MASS INDEX DOCD: CPT | Mod: CPTII,,, | Performed by: FAMILY MEDICINE

## 2023-08-22 PROCEDURE — 3044F PR MOST RECENT HEMOGLOBIN A1C LEVEL <7.0%: ICD-10-PCS | Mod: CPTII,,, | Performed by: FAMILY MEDICINE

## 2023-08-22 PROCEDURE — 99214 PR OFFICE/OUTPT VISIT, EST, LEVL IV, 30-39 MIN: ICD-10-PCS | Mod: ,,, | Performed by: FAMILY MEDICINE

## 2023-08-22 PROCEDURE — 1125F PR PAIN SEVERITY QUANTIFIED, PAIN PRESENT: ICD-10-PCS | Mod: CPTII,,, | Performed by: FAMILY MEDICINE

## 2023-08-22 PROCEDURE — 1101F PR PT FALLS ASSESS DOC 0-1 FALLS W/OUT INJ PAST YR: ICD-10-PCS | Mod: CPTII,,, | Performed by: FAMILY MEDICINE

## 2023-08-22 PROCEDURE — 3078F PR MOST RECENT DIASTOLIC BLOOD PRESSURE < 80 MM HG: ICD-10-PCS | Mod: CPTII,,, | Performed by: FAMILY MEDICINE

## 2023-08-22 PROCEDURE — 3078F DIAST BP <80 MM HG: CPT | Mod: CPTII,,, | Performed by: FAMILY MEDICINE

## 2023-08-22 PROCEDURE — 1159F MED LIST DOCD IN RCRD: CPT | Mod: CPTII,,, | Performed by: FAMILY MEDICINE

## 2023-08-22 PROCEDURE — 3288F PR FALLS RISK ASSESSMENT DOCUMENTED: ICD-10-PCS | Mod: CPTII,,, | Performed by: FAMILY MEDICINE

## 2023-08-22 NOTE — PROGRESS NOTES
Patient ID: [id number]     Chief Complaint: Pre-diabetes and Obesity        HPI:     Wen Otoole is a 67 y.o. female here today for a follow up pre-diabetes/obesity.   - She has pre-diabetes and she is obese, lost 2 pounds since last visit, she has lost 24 pounds since starting Mounjaro, but would like lose more weight, she is doing well with Mounjaro, no side effects, she is ready to proceed with increased dose. She denies family history of medullary thyroid cancer, or MEN II, or personal history of pancreatitis. She is not interested in surgical weight loss or seeing a dietician.   - She has nodule on middle finger of left hand x 2 months, hasn't grown, she would like referral for excision.   - She has chronic LBP followed by pain management (Dr. Denise Gutierrez), she has issues with mobility and would like handicap decal.   - Patient is without any other complaints today.         ----------------------------  Binge eating disorder  Generalized anxiety disorder  Hypertensive disorder  Insomnia  Obesity, unspecified  BENIGNO (obstructive sleep apnea)  Personal history of colonic polyps     Past Surgical History:   Procedure Laterality Date    BACK SURGERY      CHOLECYSTECTOMY      COLONOSCOPY  09/23/2020    KNEE ARTHROSCOPY      LUMBAR FUSION      NECK SURGERY         Review of patient's allergies indicates:  No Known Allergies    Outpatient Medications Marked as Taking for the 8/22/23 encounter (Office Visit) with Marianela Ulrich MD   Medication Sig Dispense Refill    atorvastatin (LIPITOR) 20 MG tablet Take 1 tablet (20 mg total) by mouth Daily. 90 tablet 3    busPIRone (BUSPAR) 10 MG tablet Take 1 tablet (10 mg total) by mouth 2 (two) times daily. 90 tablet 3    cholecalciferol, vitamin D3, 1,250 mcg (50,000 unit) capsule Take 1 capsule (50,000 Units total) by mouth every 7 days. 4 capsule 3    diltiaZEM (TIAZAC) 240 MG Cs24 Take 240 mg by mouth.      levocetirizine (XYZAL) 5 MG tablet Take 1 tablet (5 mg  total) by mouth Daily. 90 tablet 3    losartan-hydrochlorothiazide 100-25 mg (HYZAAR) 100-25 mg per tablet Take 1 tablet by mouth Daily. 90 tablet 3    suvorexant (BELSOMRA) 20 mg Tab Take 20 mg by mouth nightly as needed (insomnia). 30 tablet 5    [DISCONTINUED] tirzepatide 10 mg/0.5 mL PnIj Inject 10 mg into the skin every 7 days. 4 pen 1       Social History     Socioeconomic History    Marital status: Single   Tobacco Use    Smoking status: Former     Current packs/day: 0.00     Types: Cigarettes     Passive exposure: Past    Smokeless tobacco: Never   Substance and Sexual Activity    Alcohol use: Never    Drug use: Never    Sexual activity: Not Currently     Partners: Female     Birth control/protection: None        Family History   Problem Relation Age of Onset    Hypertension Mother     Diabetes Mother     Coronary artery disease Mother     Coronary artery disease Brother         Subjective:       Review of Systems:    See HPI for details    Constitutional: Denies Change in appetite. Denies Chills. Denies Fever. Denies Night sweats.  Eye: Denies Blurred vision. Denies Discharge. Denies Eye pain.  ENT: Denies Decreased hearing. Denies Sore throat. Denies Swollen glands.  Respiratory: Denies Cough. Denies Shortness of breath. Denies Shortness of breath with exertion. Denies Wheezing.  Cardiovascular: Denies Chest pain at rest. Denies Chest pain with exertion. Denies Irregular heartbeat. Denies Palpitations.  Gastrointestinal: Denies Abdominal pain. Denies Diarrhea. Denies Nausea. Denies Vomiting. Denies Hematemesis or Hematochezia.  Genitourinary: Denies Dysuria. Denies Urinary frequency. Denies Urinary urgency. Denies Blood in urine.  Endocrine: Denies Cold intolerance. Denies Excessive thirst. Denies Heat intolerance. Denies Weight loss. Denies Weight gain.  Musculoskeletal: Reports Painful joints. Denies Weakness.  Integumentary: As per HPI. Denies Rash. Denies Itching. Denies Dry skin.  Neurologic: Denies  "Dizziness. Denies Fainting. Denies Headache.  Psychiatric: Denies Depression. Denies Anxiety. Denies Suicidal/Homicidal ideations.    All Other ROS: Negative except as stated in HPI.       Objective:     /68   Pulse 62   Ht 5' 1" (1.549 m)   Wt 84.1 kg (185 lb 6.4 oz)   SpO2 99%   BMI 35.03 kg/m²     Physical Exam    General: Alert and oriented, No acute distress. Obese.   Head: Normocephalic, Atraumatic.  Eye: Pupils are equal, round and reactive to light, Extraocular movements are intact, Sclera non-icteric.  Ears/Nose/Throat: Normal, Mucosa moist,Clear.  Neck/Thyroid: Supple, Non-tender, No carotid bruit, No palpable thyromegaly or thyroid nodule, No lymphadenopathy, No JVD, Full range of motion.  Respiratory: Clear to auscultation bilaterally; No wheezes, rales or rhonchi,Non-labored respirations, Symmetrical chest wall expansion.  Cardiovascular: Regular rate and rhythm, S1/S2 normal, No murmurs, rubs or gallops.  Gastrointestinal: Soft, Non-tender, Non-distended, Normal bowel sounds, No palpable organomegaly.  Musculoskeletal: Normal range of motion. Low back with FROM, mild TTP, no erythema, no effusion, no deformity.   Integumentary: Warm, Dry, Intact, No suspicious lesions or rashes. Left middle finger with subcutaneous nodule on PIP joint, no erythema, no warmth, no effusion.   Extremities: No clubbing, cyanosis or edema.  Neurologic: No focal deficits, Cranial Nerves II-XII are grossly intact, Motor strength normal upper and lower extremities, Sensory exam intact.  Psychiatric: Normal interaction, Coherent speech, Euthymic mood, Appropriate affect         Assessment:       ICD-10-CM ICD-9-CM   1. Prediabetes  R73.03 790.29   2. Subcutaneous nodule of finger of left hand  R22.32 782.2   3. Chronic low back pain, unspecified back pain laterality, unspecified whether sciatica present  M54.50 724.2    G89.29 338.29        Plan:     Problem List Items Addressed This Visit          Endocrine    " Prediabetes - Primary    Relevant Medications    tirzepatide 12.5 mg/0.5 mL PnIj     Other Visit Diagnoses       Subcutaneous nodule of finger of left hand        Relevant Orders    Ambulatory referral/consult to Orthopedics    Chronic low back pain, unspecified back pain laterality, unspecified whether sciatica present             1. Prediabetes  - tirzepatide 12.5 mg/0.5 mL PnIj; Inject 12.5 mg into the skin every 7 days.  Dispense: 4 pen ; Refill: 1  - Diet, exercise, and 10% weight loss encouraged. Rx trial of increased dose of Mounjaro to 12.5 mg weekly with close monitoring to help with pre-diabetes/insulin resistance and obesity. Will titrate Rx Mounjaro as needed/tolerated until max dose achieved. Recheck CMP, HgA1C in 09/2023. Notify M.D. or ER if symptoms persist or worsen, adverse Rx side effects, temp greater than 100.4, or any acute illness.              Lab Results   Component Value Date     HGBA1C 5.7 03/08/2023      Continue   Follow ADA Diet. Avoid soda, simple sweets, and limit rice/pasta/breads/starches.  Maintain healthy weight with goal BMI <30.  Exercise 5 times per week for 30 minutes per day.    2. Subcutaneous nodule of finger of left hand  - Ambulatory referral/consult to Orthopedics; Future for evaluation and treatment    3. Chronic low back pain, unspecified back pain laterality, unspecified whether sciatica present  - Handicap decal filled out for patient to bring to [facility]. Continue followup with pain management as scheduled. Notify M.D. or ER if symptoms persist or worsen, temp >100.4, or any acute illness.        Wen was seen today for pre-diabetes and obesity.    Diagnoses and all orders for this visit:    Prediabetes  -     tirzepatide 12.5 mg/0.5 mL PnIj; Inject 12.5 mg into the skin every 7 days.    Subcutaneous nodule of finger of left hand  -     Ambulatory referral/consult to Orthopedics; Future    Chronic low back pain, unspecified back pain laterality, unspecified whether  sciatica present          Medication List with Changes/Refills   New Medications    TIRZEPATIDE 12.5 MG/0.5 ML PNIJ    Inject 12.5 mg into the skin every 7 days.       Start Date: 8/22/2023 End Date: 10/21/2023   Current Medications    ATORVASTATIN (LIPITOR) 20 MG TABLET    Take 1 tablet (20 mg total) by mouth Daily.       Start Date: 6/22/2023 End Date: --    BUSPIRONE (BUSPAR) 10 MG TABLET    Take 1 tablet (10 mg total) by mouth 2 (two) times daily.       Start Date: 6/22/2023 End Date: --    CHOLECALCIFEROL, VITAMIN D3, 1,250 MCG (50,000 UNIT) CAPSULE    Take 1 capsule (50,000 Units total) by mouth every 7 days.       Start Date: 7/12/2023 End Date: --    DILTIAZEM (TIAZAC) 240 MG CS24    Take 240 mg by mouth.       Start Date: 11/21/2022End Date: --    LEVOCETIRIZINE (XYZAL) 5 MG TABLET    Take 1 tablet (5 mg total) by mouth Daily.       Start Date: 6/22/2023 End Date: 6/21/2024    LOSARTAN-HYDROCHLOROTHIAZIDE 100-25 MG (HYZAAR) 100-25 MG PER TABLET    Take 1 tablet by mouth Daily.       Start Date: 6/22/2023 End Date: --    SUVOREXANT (BELSOMRA) 20 MG TAB    Take 20 mg by mouth nightly as needed (insomnia).       Start Date: 5/10/2023 End Date: --   Discontinued Medications    TIRZEPATIDE 10 MG/0.5 ML PNIJ    Inject 10 mg into the skin every 7 days.       Start Date: 7/6/2023  End Date: 8/22/2023          Follow up in about 4 weeks (around 9/19/2023) for Cholesterol Followup, HTN Followup, Prediabetes Followup- 30 minute appointment.

## 2023-09-07 ENCOUNTER — PATIENT MESSAGE (OUTPATIENT)
Dept: RESEARCH | Facility: HOSPITAL | Age: 68
End: 2023-09-07
Payer: MEDICARE

## 2023-09-27 ENCOUNTER — OFFICE VISIT (OUTPATIENT)
Dept: FAMILY MEDICINE | Facility: CLINIC | Age: 68
End: 2023-09-27
Payer: MEDICARE

## 2023-09-27 VITALS
WEIGHT: 183.5 LBS | SYSTOLIC BLOOD PRESSURE: 122 MMHG | HEIGHT: 61 IN | HEART RATE: 69 BPM | DIASTOLIC BLOOD PRESSURE: 72 MMHG | BODY MASS INDEX: 34.64 KG/M2 | OXYGEN SATURATION: 98 %

## 2023-09-27 DIAGNOSIS — R73.03 PREDIABETES: Primary | ICD-10-CM

## 2023-09-27 DIAGNOSIS — E78.49 ESSENTIAL FAMILIAL HYPERLIPIDEMIA: ICD-10-CM

## 2023-09-27 PROCEDURE — 3078F PR MOST RECENT DIASTOLIC BLOOD PRESSURE < 80 MM HG: ICD-10-PCS | Mod: CPTII,,, | Performed by: FAMILY MEDICINE

## 2023-09-27 PROCEDURE — 1101F PR PT FALLS ASSESS DOC 0-1 FALLS W/OUT INJ PAST YR: ICD-10-PCS | Mod: CPTII,,, | Performed by: FAMILY MEDICINE

## 2023-09-27 PROCEDURE — 99214 PR OFFICE/OUTPT VISIT, EST, LEVL IV, 30-39 MIN: ICD-10-PCS | Mod: ,,, | Performed by: FAMILY MEDICINE

## 2023-09-27 PROCEDURE — 3008F BODY MASS INDEX DOCD: CPT | Mod: CPTII,,, | Performed by: FAMILY MEDICINE

## 2023-09-27 PROCEDURE — 3288F FALL RISK ASSESSMENT DOCD: CPT | Mod: CPTII,,, | Performed by: FAMILY MEDICINE

## 2023-09-27 PROCEDURE — 1126F PR PAIN SEVERITY QUANTIFIED, NO PAIN PRESENT: ICD-10-PCS | Mod: CPTII,,, | Performed by: FAMILY MEDICINE

## 2023-09-27 PROCEDURE — 1101F PT FALLS ASSESS-DOCD LE1/YR: CPT | Mod: CPTII,,, | Performed by: FAMILY MEDICINE

## 2023-09-27 PROCEDURE — 3008F PR BODY MASS INDEX (BMI) DOCUMENTED: ICD-10-PCS | Mod: CPTII,,, | Performed by: FAMILY MEDICINE

## 2023-09-27 PROCEDURE — 99214 OFFICE O/P EST MOD 30 MIN: CPT | Mod: ,,, | Performed by: FAMILY MEDICINE

## 2023-09-27 PROCEDURE — 1159F MED LIST DOCD IN RCRD: CPT | Mod: CPTII,,, | Performed by: FAMILY MEDICINE

## 2023-09-27 PROCEDURE — 1160F RVW MEDS BY RX/DR IN RCRD: CPT | Mod: CPTII,,, | Performed by: FAMILY MEDICINE

## 2023-09-27 PROCEDURE — 3044F PR MOST RECENT HEMOGLOBIN A1C LEVEL <7.0%: ICD-10-PCS | Mod: CPTII,,, | Performed by: FAMILY MEDICINE

## 2023-09-27 PROCEDURE — 3288F PR FALLS RISK ASSESSMENT DOCUMENTED: ICD-10-PCS | Mod: CPTII,,, | Performed by: FAMILY MEDICINE

## 2023-09-27 PROCEDURE — 1160F PR REVIEW ALL MEDS BY PRESCRIBER/CLIN PHARMACIST DOCUMENTED: ICD-10-PCS | Mod: CPTII,,, | Performed by: FAMILY MEDICINE

## 2023-09-27 PROCEDURE — 3074F PR MOST RECENT SYSTOLIC BLOOD PRESSURE < 130 MM HG: ICD-10-PCS | Mod: CPTII,,, | Performed by: FAMILY MEDICINE

## 2023-09-27 PROCEDURE — 1159F PR MEDICATION LIST DOCUMENTED IN MEDICAL RECORD: ICD-10-PCS | Mod: CPTII,,, | Performed by: FAMILY MEDICINE

## 2023-09-27 PROCEDURE — 3044F HG A1C LEVEL LT 7.0%: CPT | Mod: CPTII,,, | Performed by: FAMILY MEDICINE

## 2023-09-27 PROCEDURE — 3074F SYST BP LT 130 MM HG: CPT | Mod: CPTII,,, | Performed by: FAMILY MEDICINE

## 2023-09-27 PROCEDURE — 3078F DIAST BP <80 MM HG: CPT | Mod: CPTII,,, | Performed by: FAMILY MEDICINE

## 2023-09-27 PROCEDURE — 1126F AMNT PAIN NOTED NONE PRSNT: CPT | Mod: CPTII,,, | Performed by: FAMILY MEDICINE

## 2023-09-27 NOTE — PATIENT INSTRUCTIONS
Khari Carver,     If you are due for any health screening(s) below please notify me so we can arrange them to be ordered and scheduled. Most healthy patients at your age complete them, but you are free to accept or refuse.     If you can't do it, I'll definitely understand. If you can, I'd certainly appreciate it!    All of your core healthy metrics are met.

## 2023-09-27 NOTE — PROGRESS NOTES
Patient ID: [id number]     Chief Complaint: Hyperlipidemia and pre diabetes        HPI:     Wen Otoole is a 67 y.o. female here today for a follow up.  - She has pre-diabetes and she is obese, lost 2 pounds since last visit, she has lost 26 pounds since starting Mounjaro, but would like lose more weight, she is doing well with Mounjaro, no side effects, she is ready to proceed with increased dose. She denies family history of medullary thyroid cancer, or MEN II, or personal history of pancreatitis. She is not interested in surgical weight loss or seeing a dietician.   - She has HLD, controlled with Rx, no side effects, due for repeat labs.   - She refuses flu vaccine today.   - Patient is without any other complaints today.         ----------------------------  Binge eating disorder  Generalized anxiety disorder  Hypertensive disorder  Insomnia  Obesity, unspecified  BENIGNO (obstructive sleep apnea)  Personal history of colonic polyps     Past Surgical History:   Procedure Laterality Date    BACK SURGERY      CHOLECYSTECTOMY      COLONOSCOPY  09/23/2020    KNEE ARTHROSCOPY      LUMBAR FUSION      NECK SURGERY         Review of patient's allergies indicates:  No Known Allergies    Outpatient Medications Marked as Taking for the 9/27/23 encounter (Office Visit) with Marianela Ulrich MD   Medication Sig Dispense Refill    atorvastatin (LIPITOR) 20 MG tablet Take 1 tablet (20 mg total) by mouth Daily. 90 tablet 3    busPIRone (BUSPAR) 10 MG tablet Take 1 tablet (10 mg total) by mouth 2 (two) times daily. 90 tablet 3    cholecalciferol, vitamin D3, 1,250 mcg (50,000 unit) capsule Take 1 capsule (50,000 Units total) by mouth every 7 days. 4 capsule 3    diltiaZEM (TIAZAC) 240 MG Cs24 Take 240 mg by mouth.      levocetirizine (XYZAL) 5 MG tablet Take 1 tablet (5 mg total) by mouth Daily. 90 tablet 3    losartan-hydrochlorothiazide 100-25 mg (HYZAAR) 100-25 mg per tablet Take 1 tablet by mouth Daily. 90 tablet 3     suvorexant (BELSOMRA) 20 mg Tab Take 20 mg by mouth nightly as needed (insomnia). 30 tablet 5    [DISCONTINUED] tirzepatide 12.5 mg/0.5 mL PnIj Inject 12.5 mg into the skin every 7 days. 4 pen 1       Social History     Socioeconomic History    Marital status: Single   Tobacco Use    Smoking status: Former     Types: Cigarettes     Passive exposure: Past    Smokeless tobacco: Never   Substance and Sexual Activity    Alcohol use: Never    Drug use: Never    Sexual activity: Not Currently     Partners: Female     Birth control/protection: None        Family History   Problem Relation Age of Onset    Hypertension Mother     Diabetes Mother     Coronary artery disease Mother     Coronary artery disease Brother         Subjective:       Review of Systems:    See HPI for details    Constitutional: Denies Change in appetite. Denies Chills. Denies Fever. Denies Night sweats.  Eye: Denies Blurred vision. Denies Discharge. Denies Eye pain.  ENT: Denies Decreased hearing. Denies Sore throat. Denies Swollen glands.  Respiratory: Denies Cough. Denies Shortness of breath. Denies Shortness of breath with exertion. Denies Wheezing.  Cardiovascular: Denies Chest pain at rest. Denies Chest pain with exertion. Denies Irregular heartbeat. Denies Palpitations.  Gastrointestinal: Denies Abdominal pain. Denies Diarrhea. Denies Nausea. Denies Vomiting. Denies Hematemesis or Hematochezia.  Genitourinary: Denies Dysuria. Denies Urinary frequency. Denies Urinary urgency. Denies Blood in urine.  Endocrine: Denies Cold intolerance. Denies Excessive thirst. Denies Heat intolerance. Denies Weight loss. Denies Weight gain.  Musculoskeletal: Denies Painful joints. Denies Weakness.  Integumentary: Denies Rash. Denies Itching. Denies Dry skin.  Neurologic: Denies Dizziness. Denies Fainting. Denies Headache.  Psychiatric: Denies Depression. Denies Anxiety. Denies Suicidal/Homicidal ideations.    All Other ROS: Negative except as stated in HPI.  "      Objective:     /72 (BP Location: Right arm, Patient Position: Sitting, BP Method: Large (Automatic))   Pulse 69   Ht 5' 1" (1.549 m)   Wt 83.2 kg (183 lb 8 oz)   SpO2 98%   BMI 34.67 kg/m²     Physical Exam    General: Alert and oriented, No acute distress. Obese.   Head: Normocephalic, Atraumatic.  Eye: Pupils are equal, round and reactive to light, Extraocular movements are intact, Sclera non-icteric.  Ears/Nose/Throat: Normal, Mucosa moist,Clear.  Neck/Thyroid: Supple, Non-tender, No carotid bruit, No palpable thyromegaly or thyroid nodule, No lymphadenopathy, No JVD, Full range of motion.  Respiratory: Clear to auscultation bilaterally; No wheezes, rales or rhonchi,Non-labored respirations, Symmetrical chest wall expansion.  Cardiovascular: Regular rate and rhythm, S1/S2 normal, No murmurs, rubs or gallops.  Gastrointestinal: Soft, Non-tender, Non-distended, Normal bowel sounds, No palpable organomegaly.  Musculoskeletal: Normal range of motion.  Integumentary: Warm, Dry, Intact, No suspicious lesions or rashes.  Extremities: No clubbing, cyanosis or edema  Neurologic: No focal deficits, Cranial Nerves II-XII are grossly intact, Motor strength normal upper and lower extremities, Sensory exam intact.  Psychiatric: Normal interaction, Coherent speech, Euthymic mood, Appropriate affect         Assessment:       ICD-10-CM ICD-9-CM   1. Prediabetes  R73.03 790.29   2. Essential familial hyperlipidemia  E78.49 272.2        Plan:     Problem List Items Addressed This Visit          Cardiac/Vascular    Essential familial hyperlipidemia    Relevant Orders    CK    Comprehensive Metabolic Panel    Lipid Panel       Endocrine    Prediabetes - Primary    Relevant Medications    tirzepatide 15 mg/0.5 mL PnIj    Other Relevant Orders    Hemoglobin A1C    1. Prediabetes  - Hemoglobin A1C; Future  - tirzepatide 15 mg/0.5 mL PnIj; Inject 15 mg into the skin every 7 days.  Dispense: 4 pen ; Refill: 11  - Diet, " exercise, and 10% weight loss encouraged. Rx trial of increased dose of Mounjaro to 15 mg weekly with close monitoring to help with pre-diabetes/insulin resistance and obesity. Will titrate Rx Mounjaro as needed/tolerated until max dose achieved. Recheck CMP, HgA1C in 09/2023. Notify M.D. or ER if symptoms persist or worsen, adverse Rx side effects, temp greater than 100.4, or any acute illness.              Lab Results   Component Value Date     HGBA1C 5.7 03/08/2023      Continue   Follow ADA Diet. Avoid soda, simple sweets, and limit rice/pasta/breads/starches.  Maintain healthy weight with goal BMI <30.  Exercise 5 times per week for 30 minutes per day.     2. Essential familial hyperlipidemia  - CK; Future  - Comprehensive Metabolic Panel; Future  - Lipid Panel; Future  - Continue current treatment plan, will titrate Rx pending results.   Continue  Stressed importance of dietary modifications. Follow a low cholesterol, low saturated fat diet with less that 200mg of cholesterol a day.  Avoid fried foods and high saturated fats (high saturated fats less than 7% of calories).  Add Flax Seed/Fish Oil supplements to diet. Increase dietary fiber.  Regular exercise can reduce LDL and raise HDL. Stressed importance of physical activity 5 times per week for 30 minutes per day.        Wen was seen today for hyperlipidemia and pre diabetes.    Diagnoses and all orders for this visit:    Prediabetes  -     Hemoglobin A1C; Future  -     tirzepatide 15 mg/0.5 mL PnIj; Inject 15 mg into the skin every 7 days.    Essential familial hyperlipidemia  -     CK; Future  -     Comprehensive Metabolic Panel; Future  -     Lipid Panel; Future          Medication List with Changes/Refills   New Medications    TIRZEPATIDE 15 MG/0.5 ML PNIJ    Inject 15 mg into the skin every 7 days.       Start Date: 9/27/2023 End Date: 9/26/2024   Current Medications    ATORVASTATIN (LIPITOR) 20 MG TABLET    Take 1 tablet (20 mg total) by mouth  Daily.       Start Date: 6/22/2023 End Date: --    BUSPIRONE (BUSPAR) 10 MG TABLET    Take 1 tablet (10 mg total) by mouth 2 (two) times daily.       Start Date: 6/22/2023 End Date: --    CHOLECALCIFEROL, VITAMIN D3, 1,250 MCG (50,000 UNIT) CAPSULE    Take 1 capsule (50,000 Units total) by mouth every 7 days.       Start Date: 7/12/2023 End Date: --    DILTIAZEM (TIAZAC) 240 MG CS24    Take 240 mg by mouth.       Start Date: 11/21/2022End Date: --    LEVOCETIRIZINE (XYZAL) 5 MG TABLET    Take 1 tablet (5 mg total) by mouth Daily.       Start Date: 6/22/2023 End Date: 6/21/2024    LOSARTAN-HYDROCHLOROTHIAZIDE 100-25 MG (HYZAAR) 100-25 MG PER TABLET    Take 1 tablet by mouth Daily.       Start Date: 6/22/2023 End Date: --    SUVOREXANT (BELSOMRA) 20 MG TAB    Take 20 mg by mouth nightly as needed (insomnia).       Start Date: 5/10/2023 End Date: --   Discontinued Medications    TIRZEPATIDE 12.5 MG/0.5 ML PNIJ    Inject 12.5 mg into the skin every 7 days.       Start Date: 8/22/2023 End Date: 9/27/2023          Follow up in about 4 weeks (around 10/25/2023) for Prediabetes Followup.

## 2023-10-09 ENCOUNTER — TELEPHONE (OUTPATIENT)
Dept: FAMILY MEDICINE | Facility: CLINIC | Age: 68
End: 2023-10-09
Payer: MEDICARE

## 2023-10-09 DIAGNOSIS — I10 HYPERTENSION, UNSPECIFIED TYPE: Primary | ICD-10-CM

## 2023-10-09 RX ORDER — DILTIAZEM HYDROCHLORIDE 240 MG/1
240 CAPSULE, EXTENDED RELEASE ORAL DAILY
Qty: 30 CAPSULE | Refills: 3 | Status: SHIPPED | OUTPATIENT
Start: 2023-10-09 | End: 2023-10-31 | Stop reason: SDUPTHER

## 2023-10-09 NOTE — TELEPHONE ENCOUNTER
----- Message from Dario Shade sent at 10/9/2023 11:36 AM CDT -----  .Type:  RX Refill Request    Who Called: pt  Refill or New Rx:Refill  RX Name and Strength:diltiaZEM (TIAZAC) 240 MG Cs24  How is the patient currently taking it? (ex. 1XDay):  Is this a 30 day or 90 day RX:  Preferred Pharmacy with phone number:[id number] AT [address]  Local or Mail Order:local  Ordering Provider:  Would the patient rather a call back or a response via [facility]? Call back  Best Call Back Number: [phone]  Additional Information: pt unable to verify strength of medication or how she takes it, medication listed under historical not current medication

## 2023-10-12 ENCOUNTER — LAB VISIT (OUTPATIENT)
Dept: LAB | Facility: HOSPITAL | Age: 68
End: 2023-10-12
Attending: FAMILY MEDICINE
Payer: MEDICARE

## 2023-10-12 DIAGNOSIS — R79.89 HIGH SERUM TOTAL PROTEIN LEVEL: Primary | ICD-10-CM

## 2023-10-12 DIAGNOSIS — E78.49 ESSENTIAL FAMILIAL HYPERLIPIDEMIA: ICD-10-CM

## 2023-10-12 DIAGNOSIS — R77.1 ELEVATED SERUM GLOBULIN LEVEL: ICD-10-CM

## 2023-10-12 DIAGNOSIS — R73.03 PREDIABETES: ICD-10-CM

## 2023-10-12 LAB
ALBUMIN SERPL-MCNC: 4.2 G/DL (ref 3.4–4.8)
ALBUMIN/GLOB SERPL: 1.2 RATIO (ref 1.1–2)
ALP SERPL-CCNC: 84 UNIT/L (ref 40–150)
ALT SERPL-CCNC: 30 UNIT/L (ref 0–55)
AST SERPL-CCNC: 25 UNIT/L (ref 5–34)
BILIRUB SERPL-MCNC: 0.8 MG/DL
BUN SERPL-MCNC: 12.8 MG/DL (ref 9.8–20.1)
CALCIUM SERPL-MCNC: 10.4 MG/DL (ref 8.4–10.2)
CHLORIDE SERPL-SCNC: 104 MMOL/L (ref 98–107)
CHOLEST SERPL-MCNC: 206 MG/DL
CHOLEST/HDLC SERPL: 3 {RATIO} (ref 0–5)
CK SERPL-CCNC: 147 U/L (ref 29–168)
CO2 SERPL-SCNC: 28 MMOL/L (ref 23–31)
CREAT SERPL-MCNC: 0.83 MG/DL (ref 0.55–1.02)
EST. AVERAGE GLUCOSE BLD GHB EST-MCNC: 105.4 MG/DL
GFR SERPLBLD CREATININE-BSD FMLA CKD-EPI: >60 MLS/MIN/1.73/M2
GLOBULIN SER-MCNC: 3.6 GM/DL (ref 2.4–3.5)
GLUCOSE SERPL-MCNC: 99 MG/DL (ref 82–115)
HBA1C MFR BLD: 5.3 %
HDLC SERPL-MCNC: 74 MG/DL (ref 35–60)
LDLC SERPL CALC-MCNC: 117 MG/DL (ref 50–140)
POTASSIUM SERPL-SCNC: 4.5 MMOL/L (ref 3.5–5.1)
PROT SERPL-MCNC: 7.8 GM/DL (ref 5.8–7.6)
SODIUM SERPL-SCNC: 146 MMOL/L (ref 136–145)
TRIGL SERPL-MCNC: 75 MG/DL (ref 37–140)
VLDLC SERPL CALC-MCNC: 15 MG/DL

## 2023-10-12 PROCEDURE — 80061 LIPID PANEL: CPT

## 2023-10-12 PROCEDURE — 82550 ASSAY OF CK (CPK): CPT

## 2023-10-12 PROCEDURE — 80053 COMPREHEN METABOLIC PANEL: CPT

## 2023-10-12 PROCEDURE — 83036 HEMOGLOBIN GLYCOSYLATED A1C: CPT

## 2023-10-12 PROCEDURE — 36415 COLL VENOUS BLD VENIPUNCTURE: CPT

## 2023-10-13 ENCOUNTER — TELEPHONE (OUTPATIENT)
Dept: FAMILY MEDICINE | Facility: CLINIC | Age: 68
End: 2023-10-13
Payer: MEDICARE

## 2023-10-13 NOTE — TELEPHONE ENCOUNTER
----- Message from Marianela Ulrich MD sent at 10/12/2023  4:48 PM CDT -----  Calcium level is slightly elevated, consistent with her history of hyperparathyroidism, please forward results to her Endocrinologist (Dr. Rivera) for review/treatment. Serum total protein and globulin levels are slightly elevated, order to add serum protein electrophoresis and EMMNAUEL screen to lab is in file, my office staff will contact the lab to add. Cholesterol is well controlled, continue current treatment plan, recheck CMP, FLP, CPK in 04/2024. Pre-diabetes is well controlled, HgA1C: 5.3%, was 5.5%, normal is <5.7%, diabetes starts at 6.5% or higher, continue current treatment plan, recheck CMP, HgA1C in 04/2024. Remaining labs are essentially stable.

## 2023-10-31 ENCOUNTER — OFFICE VISIT (OUTPATIENT)
Dept: FAMILY MEDICINE | Facility: CLINIC | Age: 68
End: 2023-10-31
Payer: MEDICARE

## 2023-10-31 VITALS
WEIGHT: 181 LBS | BODY MASS INDEX: 34.17 KG/M2 | HEART RATE: 65 BPM | OXYGEN SATURATION: 97 % | SYSTOLIC BLOOD PRESSURE: 128 MMHG | DIASTOLIC BLOOD PRESSURE: 79 MMHG | HEIGHT: 61 IN

## 2023-10-31 DIAGNOSIS — I10 HYPERTENSION, UNSPECIFIED TYPE: ICD-10-CM

## 2023-10-31 DIAGNOSIS — R73.03 PREDIABETES: Primary | ICD-10-CM

## 2023-10-31 PROCEDURE — 3288F FALL RISK ASSESSMENT DOCD: CPT | Mod: CPTII,,, | Performed by: FAMILY MEDICINE

## 2023-10-31 PROCEDURE — 3074F SYST BP LT 130 MM HG: CPT | Mod: CPTII,,, | Performed by: FAMILY MEDICINE

## 2023-10-31 PROCEDURE — 3288F PR FALLS RISK ASSESSMENT DOCUMENTED: ICD-10-PCS | Mod: CPTII,,, | Performed by: FAMILY MEDICINE

## 2023-10-31 PROCEDURE — 1126F AMNT PAIN NOTED NONE PRSNT: CPT | Mod: CPTII,,, | Performed by: FAMILY MEDICINE

## 2023-10-31 PROCEDURE — 1101F PT FALLS ASSESS-DOCD LE1/YR: CPT | Mod: CPTII,,, | Performed by: FAMILY MEDICINE

## 2023-10-31 PROCEDURE — 99214 OFFICE O/P EST MOD 30 MIN: CPT | Mod: ,,, | Performed by: FAMILY MEDICINE

## 2023-10-31 PROCEDURE — 1101F PR PT FALLS ASSESS DOC 0-1 FALLS W/OUT INJ PAST YR: ICD-10-PCS | Mod: CPTII,,, | Performed by: FAMILY MEDICINE

## 2023-10-31 PROCEDURE — 3074F PR MOST RECENT SYSTOLIC BLOOD PRESSURE < 130 MM HG: ICD-10-PCS | Mod: CPTII,,, | Performed by: FAMILY MEDICINE

## 2023-10-31 PROCEDURE — 99214 PR OFFICE/OUTPT VISIT, EST, LEVL IV, 30-39 MIN: ICD-10-PCS | Mod: ,,, | Performed by: FAMILY MEDICINE

## 2023-10-31 PROCEDURE — 3044F PR MOST RECENT HEMOGLOBIN A1C LEVEL <7.0%: ICD-10-PCS | Mod: CPTII,,, | Performed by: FAMILY MEDICINE

## 2023-10-31 PROCEDURE — 1159F MED LIST DOCD IN RCRD: CPT | Mod: CPTII,,, | Performed by: FAMILY MEDICINE

## 2023-10-31 PROCEDURE — 3078F DIAST BP <80 MM HG: CPT | Mod: CPTII,,, | Performed by: FAMILY MEDICINE

## 2023-10-31 PROCEDURE — 1159F PR MEDICATION LIST DOCUMENTED IN MEDICAL RECORD: ICD-10-PCS | Mod: CPTII,,, | Performed by: FAMILY MEDICINE

## 2023-10-31 PROCEDURE — 3008F BODY MASS INDEX DOCD: CPT | Mod: CPTII,,, | Performed by: FAMILY MEDICINE

## 2023-10-31 PROCEDURE — 1160F RVW MEDS BY RX/DR IN RCRD: CPT | Mod: CPTII,,, | Performed by: FAMILY MEDICINE

## 2023-10-31 PROCEDURE — 3078F PR MOST RECENT DIASTOLIC BLOOD PRESSURE < 80 MM HG: ICD-10-PCS | Mod: CPTII,,, | Performed by: FAMILY MEDICINE

## 2023-10-31 PROCEDURE — 3008F PR BODY MASS INDEX (BMI) DOCUMENTED: ICD-10-PCS | Mod: CPTII,,, | Performed by: FAMILY MEDICINE

## 2023-10-31 PROCEDURE — 1160F PR REVIEW ALL MEDS BY PRESCRIBER/CLIN PHARMACIST DOCUMENTED: ICD-10-PCS | Mod: CPTII,,, | Performed by: FAMILY MEDICINE

## 2023-10-31 PROCEDURE — 3044F HG A1C LEVEL LT 7.0%: CPT | Mod: CPTII,,, | Performed by: FAMILY MEDICINE

## 2023-10-31 PROCEDURE — 1126F PR PAIN SEVERITY QUANTIFIED, NO PAIN PRESENT: ICD-10-PCS | Mod: CPTII,,, | Performed by: FAMILY MEDICINE

## 2023-10-31 RX ORDER — DILTIAZEM HYDROCHLORIDE 240 MG/1
240 CAPSULE, EXTENDED RELEASE ORAL DAILY
Qty: 90 CAPSULE | Refills: 3 | Status: SHIPPED | OUTPATIENT
Start: 2023-10-31 | End: 2024-10-30

## 2023-10-31 NOTE — PROGRESS NOTES
Patient ID: [id number]     Chief Complaint: Pre-diabetes        HPI:     Wen Otoole is a 68 y.o. female here today for a follow up pre-diabetes.   - She has pre-diabetes and she is obese, lost 2 pounds since last visit, she has lost 28 pounds since starting Mounjaro, but would like lose more weight, she is doing well with Mounjaro, no side effects, she is ready to proceed with decreased dose. She denies family history of medullary thyroid cancer, or MEN II, or personal history of pancreatitis. She is not interested in surgical weight loss or seeing a dietician.   - HTN is controlled with Rx, no side effects, asymptomatic, she needs a refill of Diltiazem today.   - She refuses flu vaccine, COVID-19 vaccine, and RSV vaccine today.   - Patient is without any other complaints today.          ----------------------------  Binge eating disorder  Generalized anxiety disorder  Hypertensive disorder  Insomnia  Obesity, unspecified  BENIGNO (obstructive sleep apnea)  Personal history of colonic polyps     Past Surgical History:   Procedure Laterality Date    BACK SURGERY      CHOLECYSTECTOMY      COLONOSCOPY  09/23/2020    KNEE ARTHROSCOPY      LUMBAR FUSION      NECK SURGERY         Review of patient's allergies indicates:  No Known Allergies    Outpatient Medications Marked as Taking for the 10/31/23 encounter (Office Visit) with Marianela Ulrich MD   Medication Sig Dispense Refill    atorvastatin (LIPITOR) 20 MG tablet Take 1 tablet (20 mg total) by mouth Daily. 90 tablet 3    busPIRone (BUSPAR) 10 MG tablet Take 1 tablet (10 mg total) by mouth 2 (two) times daily. 90 tablet 3    cholecalciferol, vitamin D3, 1,250 mcg (50,000 unit) capsule Take 1 capsule (50,000 Units total) by mouth every 7 days. 4 capsule 3    levocetirizine (XYZAL) 5 MG tablet Take 1 tablet (5 mg total) by mouth Daily. 90 tablet 3    losartan-hydrochlorothiazide 100-25 mg (HYZAAR) 100-25 mg per tablet Take 1 tablet by mouth Daily. 90 tablet 3     suvorexant (BELSOMRA) 20 mg Tab Take 20 mg by mouth nightly as needed (insomnia). 30 tablet 5    [DISCONTINUED] diltiaZEM (TIAZAC) 240 MG Cs24 Take 1 capsule (240 mg total) by mouth once daily. 30 capsule 3    [DISCONTINUED] tirzepatide 15 mg/0.5 mL PnIj Inject 15 mg into the skin every 7 days. 4 pen 11       Social History     Socioeconomic History    Marital status: Single   Tobacco Use    Smoking status: Former     Types: Cigarettes     Passive exposure: Past    Smokeless tobacco: Never   Substance and Sexual Activity    Alcohol use: Never    Drug use: Never    Sexual activity: Not Currently     Partners: Female     Birth control/protection: None        Family History   Problem Relation Age of Onset    Hypertension Mother     Diabetes Mother     Coronary artery disease Mother     Coronary artery disease Brother         Subjective:       Review of Systems:    See HPI for details    Constitutional: Denies Change in appetite. Denies Chills. Denies Fever. Denies Night sweats.  Eye: Denies Blurred vision. Denies Discharge. Denies Eye pain.  ENT: Denies Decreased hearing. Denies Sore throat. Denies Swollen glands.  Respiratory: Denies Cough. Denies Shortness of breath. Denies Shortness of breath with exertion. Denies Wheezing.  Cardiovascular: Denies Chest pain at rest. Denies Chest pain with exertion. Denies Irregular heartbeat. Denies Palpitations.  Gastrointestinal: Denies Abdominal pain. Denies Diarrhea. Denies Nausea. Denies Vomiting. Denies Hematemesis or Hematochezia.  Genitourinary: Denies Dysuria. Denies Urinary frequency. Denies Urinary urgency. Denies Blood in urine.  Endocrine: Denies Cold intolerance. Denies Excessive thirst. Denies Heat intolerance. Denies Weight loss. Denies Weight gain.  Musculoskeletal: Denies Painful joints. Denies Weakness.  Integumentary: Denies Rash. Denies Itching. Denies Dry skin.  Neurologic: Denies Dizziness. Denies Fainting. Denies Headache.  Psychiatric: Denies Depression.  "Denies Anxiety. Denies Suicidal/Homicidal ideations.    All Other ROS: Negative except as stated in HPI.       Objective:     /79 (BP Location: Right arm, Patient Position: Sitting, BP Method: Medium (Automatic))   Pulse 65   Ht 5' 1" (1.549 m)   Wt 82.1 kg (181 lb)   SpO2 97%   BMI 34.20 kg/m²     Physical Exam    General: Alert and oriented, No acute distress. Obese.   Head: Normocephalic, Atraumatic.  Eye: Pupils are equal, round and reactive to light, Extraocular movements are intact, Sclera non-icteric.  Ears/Nose/Throat: Normal, Mucosa moist,Clear.  Neck/Thyroid: Supple, Non-tender, No carotid bruit, No palpable thyromegaly or thyroid nodule, No lymphadenopathy, No JVD, Full range of motion.  Respiratory: Clear to auscultation bilaterally; No wheezes, rales or rhonchi,Non-labored respirations, Symmetrical chest wall expansion.  Cardiovascular: Regular rate and rhythm, S1/S2 normal, No murmurs, rubs or gallops.  Gastrointestinal: Soft, Non-tender, Non-distended, Normal bowel sounds, No palpable organomegaly.  Musculoskeletal: Normal range of motion.  Integumentary: Warm, Dry, Intact, No suspicious lesions or rashes.  Extremities: No clubbing, cyanosis or edema  Neurologic: No focal deficits, Cranial Nerves II-XII are grossly intact, Motor strength normal upper and lower extremities, Sensory exam intact.  Psychiatric: Normal interaction, Coherent speech, Euthymic mood, Appropriate affect.        Assessment:       ICD-10-CM ICD-9-CM   1. Prediabetes  R73.03 790.29   2. Hypertension, unspecified type  I10 401.9        Plan:     Problem List Items Addressed This Visit          Cardiac/Vascular    Hypertension    Relevant Medications    diltiaZEM (TIAZAC) 240 MG Cs24       Endocrine    Prediabetes - Primary    Relevant Medications    tirzepatide 12.5 mg/0.5 mL PnIj    1. Prediabetes  - tirzepatide 12.5 mg/0.5 mL PnIj; Inject 12.5 mg into the skin every 7 days.  Dispense: 4 pen ; Refill: 2  - Diet, " exercise, and 10% weight loss encouraged. Rx trial of decreased dose of Mounjaro to 12.5 mg weekly with close monitoring to help with pre-diabetes/insulin resistance and obesity. Will titrate Rx Mounjaro as needed/tolerated until max dose achieved. Recheck CMP, HgA1C in 04/2024. Notify M.D. or ER if symptoms persist or worsen, adverse Rx side effects, temp greater than 100.4, or any acute illness.              Lab Results   Component Value Date     HGBA1C  HGBA1C 5.7  5.3 03/08/2023  10/12/2023      Continue   Follow ADA Diet. Avoid soda, simple sweets, and limit rice/pasta/breads/starches.  Maintain healthy weight with goal BMI <30.  Exercise 5 times per week for 30 minutes per day.    2. Hypertension, unspecified type  - Rx diltiaZEM (TIAZAC) 240 MG Cs24; Take 1 capsule (240 mg total) by mouth once daily.  Dispense: 90 capsule; Refill: 3 refilled today.   - BP is well controlled. Continue BP Rx as prescribed. Continue followup with Cardiology as scheduled. Keep daily BP log. Notify M.D. or ER if BP >170/100 or <90/60, chest pain, palpitations, headache, SOB, temp greater than 100.4, or any acute illness.   Continue  Low Sodium Diet (DASH Diet - Less than 2 grams of sodium per day).  Monitor blood pressure daily and log. Report consistent numbers greater than 140/90.  Smoking cessation encouraged to aid in BP reduction.  Maintain healthy weight with goal BMI <30. Exercise 30 minutes per day, 5 days per week.      Wen was seen today for pre-diabetes.    Diagnoses and all orders for this visit:    Prediabetes  -     tirzepatide 12.5 mg/0.5 mL PnIj; Inject 12.5 mg into the skin every 7 days.    Hypertension, unspecified type  -     diltiaZEM (TIAZAC) 240 MG Cs24; Take 1 capsule (240 mg total) by mouth once daily.          Medication List with Changes/Refills   New Medications    TIRZEPATIDE 12.5 MG/0.5 ML PNIJ    Inject 12.5 mg into the skin every 7 days.       Start Date: 10/31/2023End Date: 1/29/2024   Current  Medications    ATORVASTATIN (LIPITOR) 20 MG TABLET    Take 1 tablet (20 mg total) by mouth Daily.       Start Date: 6/22/2023 End Date: --    BUSPIRONE (BUSPAR) 10 MG TABLET    Take 1 tablet (10 mg total) by mouth 2 (two) times daily.       Start Date: 6/22/2023 End Date: --    CHOLECALCIFEROL, VITAMIN D3, 1,250 MCG (50,000 UNIT) CAPSULE    Take 1 capsule (50,000 Units total) by mouth every 7 days.       Start Date: 7/12/2023 End Date: --    LEVOCETIRIZINE (XYZAL) 5 MG TABLET    Take 1 tablet (5 mg total) by mouth Daily.       Start Date: 6/22/2023 End Date: 6/21/2024    LOSARTAN-HYDROCHLOROTHIAZIDE 100-25 MG (HYZAAR) 100-25 MG PER TABLET    Take 1 tablet by mouth Daily.       Start Date: 6/22/2023 End Date: --    SUVOREXANT (BELSOMRA) 20 MG TAB    Take 20 mg by mouth nightly as needed (insomnia).       Start Date: 5/10/2023 End Date: --   Changed and/or Refilled Medications    Modified Medication Previous Medication    DILTIAZEM (TIAZAC) 240 MG CS24 diltiaZEM (TIAZAC) 240 MG Cs24       Take 1 capsule (240 mg total) by mouth once daily.    Take 1 capsule (240 mg total) by mouth once daily.       Start Date: 10/31/2023End Date: 10/30/2024    Start Date: 10/9/2023 End Date: 10/31/2023   Discontinued Medications    TIRZEPATIDE 15 MG/0.5 ML PNIJ    Inject 15 mg into the skin every 7 days.       Start Date: 9/27/2023 End Date: 10/31/2023          Follow up in about 3 months (around 1/31/2024) for Prediabetes Followup.

## 2023-11-14 ENCOUNTER — TELEPHONE (OUTPATIENT)
Dept: FAMILY MEDICINE | Facility: CLINIC | Age: 68
End: 2023-11-14
Payer: MEDICARE

## 2023-11-14 DIAGNOSIS — R73.03 PREDIABETES: Primary | ICD-10-CM

## 2023-11-14 NOTE — TELEPHONE ENCOUNTER
----- Message from Janice Tellez LPN sent at 11/14/2023  1:06 PM CST -----  Regarding: meds  Please advise Thanks  ----- Message -----  From: Rosa Bills  Sent: 11/14/2023  11:44 AM CST  To: [facility]ICKERSON Staff    .Type:  Needs Medical Advice    Who Called:  pt    Symptoms (please be specific):  no     How long has patient had these symptoms:   no    Pharmacy name and phone #:   [facility] in [place]     Would the patient rather a call back? Yes    Best Call Back Number:  [phone]    Additional Information:  wants to switch this medication to 15.0 mg instead of 12.5 mg  tirzepatide 12.5 mg/0.5 mL PnIj

## 2023-12-05 DIAGNOSIS — R73.03 PREDIABETES: ICD-10-CM

## 2023-12-21 DIAGNOSIS — F41.9 ANXIETY DISORDER, UNSPECIFIED: ICD-10-CM

## 2023-12-22 RX ORDER — BUSPIRONE HYDROCHLORIDE 10 MG/1
10 TABLET ORAL 2 TIMES DAILY
Qty: 60 TABLET | Refills: 2 | Status: SHIPPED | OUTPATIENT
Start: 2023-12-22

## 2024-01-02 ENCOUNTER — TELEPHONE (OUTPATIENT)
Dept: FAMILY MEDICINE | Facility: CLINIC | Age: 69
End: 2024-01-02
Payer: MEDICARE

## 2024-01-02 NOTE — TELEPHONE ENCOUNTER
----- Message from Elenita Arauz sent at 1/2/2024  1:53 PM CST -----  Type:  Pharmacy Calling to Clarify an RX    Name of Caller:Ana  Pharmacy Name:[facility]    Best Call Back Number:[phone]  Additional Information: called to get dx code for mounjaro ( tried calling back line)

## 2024-01-03 ENCOUNTER — TELEPHONE (OUTPATIENT)
Dept: FAMILY MEDICINE | Facility: CLINIC | Age: 69
End: 2024-01-03
Payer: MEDICARE

## 2024-01-03 ENCOUNTER — PATIENT MESSAGE (OUTPATIENT)
Dept: FAMILY MEDICINE | Facility: CLINIC | Age: 69
End: 2024-01-03
Payer: MEDICARE

## 2024-01-03 DIAGNOSIS — E66.01 CLASS 2 SEVERE OBESITY DUE TO EXCESS CALORIES WITH SERIOUS COMORBIDITY AND BODY MASS INDEX (BMI) OF 37.0 TO 37.9 IN ADULT: Primary | ICD-10-CM

## 2024-01-03 RX ORDER — TIRZEPATIDE 15 MG/.5ML
15 INJECTION, SOLUTION SUBCUTANEOUS
Qty: 4 PEN | Refills: 1 | Status: SHIPPED | OUTPATIENT
Start: 2024-01-03 | End: 2024-03-03

## 2024-01-03 NOTE — TELEPHONE ENCOUNTER
----- Message from Janice Telelz LPN sent at 1/3/2024  8:32 AM CST -----  Regarding: FW: Patient Call  Pts switched insurances and her new insurance is still denying the Mounjaro. She asked what steps can we take now?  ----- Message -----  From: Alise Montana  Sent: 1/3/2024   8:18 AM CST  To: [facility]ICKERSON Staff  Subject: Patient Call                                     .Type:  Patient Returning Call    Who Called:pt  Who Left Message for Patient:office staff  Does the patient know what this is regarding?:medication status   Would the patient rather a call back or a response via [facility]?   Best Call Back Number:[phone]  Additional Information: pt is calling to check the status of her prescription Mounjarn.. she stated her insurance told her it wouldn't be covered, I did inform patient that an call was made to her pharmacy with new billing codes, she stated that she would like for the office to her a call with the status, please call

## 2024-01-09 ENCOUNTER — TELEPHONE (OUTPATIENT)
Dept: FAMILY MEDICINE | Facility: CLINIC | Age: 69
End: 2024-01-09
Payer: MEDICARE

## 2024-01-09 NOTE — TELEPHONE ENCOUNTER
----- Message from Renée Hanley sent at 1/9/2024 11:34 AM CST -----  Regarding: medical advice  Type:  Needs Medical Advice    Who Called: Wen    Would the patient rather a call back or a response via [facility]? Call back    Best Call Back Number: [phone]    Additional Information: Is requesting a call back to discuss her medications.

## 2024-02-07 ENCOUNTER — OFFICE VISIT (OUTPATIENT)
Dept: FAMILY MEDICINE | Facility: CLINIC | Age: 69
End: 2024-02-07
Payer: MEDICARE

## 2024-02-07 VITALS
WEIGHT: 168 LBS | DIASTOLIC BLOOD PRESSURE: 74 MMHG | BODY MASS INDEX: 31.72 KG/M2 | HEIGHT: 61 IN | RESPIRATION RATE: 17 BRPM | HEART RATE: 66 BPM | SYSTOLIC BLOOD PRESSURE: 109 MMHG | OXYGEN SATURATION: 98 %

## 2024-02-07 DIAGNOSIS — E66.09 CLASS 1 OBESITY DUE TO EXCESS CALORIES WITH SERIOUS COMORBIDITY AND BODY MASS INDEX (BMI) OF 31.0 TO 31.9 IN ADULT: ICD-10-CM

## 2024-02-07 DIAGNOSIS — E78.49 ESSENTIAL FAMILIAL HYPERLIPIDEMIA: ICD-10-CM

## 2024-02-07 DIAGNOSIS — I10 HYPERTENSION, UNSPECIFIED TYPE: Primary | ICD-10-CM

## 2024-02-07 DIAGNOSIS — R73.03 PREDIABETES: ICD-10-CM

## 2024-02-07 DIAGNOSIS — E21.3 HYPERPARATHYROIDISM: ICD-10-CM

## 2024-02-07 DIAGNOSIS — E55.9 VITAMIN D DEFICIENCY: ICD-10-CM

## 2024-02-07 PROCEDURE — 3288F FALL RISK ASSESSMENT DOCD: CPT | Mod: CPTII,,, | Performed by: FAMILY MEDICINE

## 2024-02-07 PROCEDURE — 99214 OFFICE O/P EST MOD 30 MIN: CPT | Mod: ,,, | Performed by: FAMILY MEDICINE

## 2024-02-07 PROCEDURE — 1160F RVW MEDS BY RX/DR IN RCRD: CPT | Mod: CPTII,,, | Performed by: FAMILY MEDICINE

## 2024-02-07 PROCEDURE — 3008F BODY MASS INDEX DOCD: CPT | Mod: CPTII,,, | Performed by: FAMILY MEDICINE

## 2024-02-07 PROCEDURE — 3078F DIAST BP <80 MM HG: CPT | Mod: CPTII,,, | Performed by: FAMILY MEDICINE

## 2024-02-07 PROCEDURE — 3074F SYST BP LT 130 MM HG: CPT | Mod: CPTII,,, | Performed by: FAMILY MEDICINE

## 2024-02-07 PROCEDURE — 1101F PT FALLS ASSESS-DOCD LE1/YR: CPT | Mod: CPTII,,, | Performed by: FAMILY MEDICINE

## 2024-02-07 PROCEDURE — 1159F MED LIST DOCD IN RCRD: CPT | Mod: CPTII,,, | Performed by: FAMILY MEDICINE

## 2024-02-07 PROCEDURE — 1126F AMNT PAIN NOTED NONE PRSNT: CPT | Mod: CPTII,,, | Performed by: FAMILY MEDICINE

## 2024-02-07 RX ORDER — ASPIRIN 325 MG
50000 TABLET, DELAYED RELEASE (ENTERIC COATED) ORAL
Qty: 12 CAPSULE | Refills: 3 | Status: SHIPPED | OUTPATIENT
Start: 2024-02-07 | End: 2024-02-26

## 2024-02-07 NOTE — PROGRESS NOTES
Patient ID: [id number]     Chief Complaint: Pre-diabetes        HPI:     Wen Otoole is a 68 y.o. female here today for a follow up HTN, HLD, and Pre-diabetes.   - HTN/HLD is stable and asymptomatic with current Rx and followed by Cardiology (Dr. Vidal). She is UTD on labs. She would like to be enrolled in digital medicine program.    - She has pre-diabetes and she is obese, lost 13 pounds since last visit, she has lost 41 pounds since starting Mounjaro, but would like lose more weight, she is doing well with Mounjaro, no side effects, she wants to stay at her current dose, she has Rx at home. She denies family history of medullary thyroid cancer, or MEN II, or personal history of pancreatitis. She is not interested in surgical weight loss or seeing a dietician.   - Hyperparathyroidism is asymptomatic, followed by Endocrinology (Dr. Duque).   - She has MMG order from her GYN.   - She refuses flu vaccine, Tdap, COVID-19 vaccine, and RSV vaccine today.   - She has Vitamin D deficiency, controlled with Rx, no side effects, she needs Rx refill of Vitamin D.   - Patient is without any other complaints today.         ----------------------------  Binge eating disorder  Generalized anxiety disorder  Hypertensive disorder  Insomnia  Obesity, unspecified  BENIGNO (obstructive sleep apnea)  Personal history of colonic polyps     Past Surgical History:   Procedure Laterality Date    BACK SURGERY      CHOLECYSTECTOMY      COLONOSCOPY  09/23/2020    KNEE ARTHROSCOPY      LUMBAR FUSION      NECK SURGERY         Review of patient's allergies indicates:  No Known Allergies    Outpatient Medications Marked as Taking for the 2/7/24 encounter (Office Visit) with Marianela Ulrich MD   Medication Sig Dispense Refill    atorvastatin (LIPITOR) 20 MG tablet Take 1 tablet (20 mg total) by mouth Daily. 90 tablet 3    busPIRone (BUSPAR) 10 MG tablet TAKE ONE TABLET BY MOUTH TWICE A DAY 60 tablet 2    diltiaZEM (TIAZAC) 240 MG Cs24  Take 1 capsule (240 mg total) by mouth once daily. 90 capsule 3    levocetirizine (XYZAL) 5 MG tablet Take 1 tablet (5 mg total) by mouth Daily. 90 tablet 3    losartan-hydrochlorothiazide 100-25 mg (HYZAAR) 100-25 mg per tablet Take 1 tablet by mouth Daily. 90 tablet 3    suvorexant (BELSOMRA) 20 mg Tab Take 20 mg by mouth nightly as needed (insomnia). 30 tablet 5    tirzepatide, weight loss, (ZEPBOUND) 15 mg/0.5 mL PnIj Inject 15 mg into the skin every 7 days. 4 Pen 1    [DISCONTINUED] cholecalciferol, vitamin D3, 1,250 mcg (50,000 unit) capsule Take 1 capsule (50,000 Units total) by mouth every 7 days. 4 capsule 3       Social History     Socioeconomic History    Marital status: Single   Tobacco Use    Smoking status: Former     Current packs/day: 0.00     Types: Cigarettes     Passive exposure: Past    Smokeless tobacco: Never   Substance and Sexual Activity    Alcohol use: Never    Drug use: Never    Sexual activity: Not Currently     Partners: Female     Birth control/protection: None        Family History   Problem Relation Age of Onset    Hypertension Mother     Diabetes Mother     Coronary artery disease Mother     Coronary artery disease Brother         Subjective:       Review of Systems:    See HPI for details    Constitutional: Denies Change in appetite. Denies Chills. Denies Fever. Denies Night sweats.  Eye: Denies Blurred vision. Denies Discharge. Denies Eye pain.  ENT: Denies Decreased hearing. Denies Sore throat. Denies Swollen glands.  Respiratory: Denies Cough. Denies Shortness of breath. Denies Shortness of breath with exertion. Denies Wheezing.  Cardiovascular: Denies Chest pain at rest. Denies Chest pain with exertion. Denies Irregular heartbeat. Denies Palpitations.  Gastrointestinal: Denies Abdominal pain. Denies Diarrhea. Denies Nausea. Denies Vomiting. Denies Hematemesis or Hematochezia.  Genitourinary: Denies Dysuria. Denies Urinary frequency. Denies Urinary urgency. Denies Blood in  "urine.  Endocrine: Denies Cold intolerance. Denies Excessive thirst. Denies Heat intolerance. Denies Weight loss. Denies Weight gain.  Musculoskeletal: Denies Painful joints. Denies Weakness.  Integumentary: Denies Rash. Denies Itching. Denies Dry skin.  Neurologic: Denies Dizziness. Denies Fainting. Denies Headache.  Psychiatric: Denies Depression. Denies Anxiety. Denies Suicidal/Homicidal ideations.    All Other ROS: Negative except as stated in HPI.       Objective:     /74 (BP Location: Right arm, Patient Position: Sitting, BP Method: Large (Automatic))   Pulse 66   Resp 17   Ht 5' 1" (1.549 m)   Wt 76.2 kg (168 lb)   SpO2 98%   BMI 31.74 kg/m²     Physical Exam    General: Alert and oriented, No acute distress. Obese.   Head: Normocephalic, Atraumatic.  Eye: Pupils are equal, round and reactive to light, Extraocular movements are intact, Sclera non-icteric.  Ears/Nose/Throat: Normal, Mucosa moist,Clear.  Neck/Thyroid: Supple, Non-tender, No carotid bruit, No palpable thyromegaly or thyroid nodule, No lymphadenopathy, No JVD, Full range of motion.  Respiratory: Clear to auscultation bilaterally; No wheezes, rales or rhonchi,Non-labored respirations, Symmetrical chest wall expansion.  Cardiovascular: Regular rate and rhythm, S1/S2 normal, No murmurs, rubs or gallops.  Gastrointestinal: Soft, Non-tender, Non-distended, Normal bowel sounds, No palpable organomegaly.  Musculoskeletal: Normal range of motion.  Integumentary: Warm, Dry, Intact, No suspicious lesions or rashes.  Extremities: No clubbing, cyanosis or edema  Neurologic: No focal deficits, Cranial Nerves II-XII are grossly intact, Motor strength normal upper and lower extremities, Sensory exam intact.  Psychiatric: Normal interaction, Coherent speech, Euthymic mood, Appropriate affect.         Assessment:       ICD-10-CM ICD-9-CM   1. Hypertension, unspecified type  I10 401.9   2. Essential familial hyperlipidemia  E78.49 272.2   3. " Prediabetes  R73.03 790.29   4. Hyperparathyroidism  E21.3 252.00   5. Class 1 obesity due to excess calories with serious comorbidity and body mass index (BMI) of 31.0 to 31.9 in adult  E66.09 278.00    Z68.31 V85.31   6. Vitamin D deficiency  E55.9 268.9        Plan:     Problem List Items Addressed This Visit          Cardiac/Vascular    Hypertension - Primary    Relevant Orders    Hypertension Digital Medicine (HDMP) Enrollment Order (Completed)    Essential familial hyperlipidemia    Relevant Orders    Lipids Digital Medicine (LDMP) Enrollment Order (Completed)    Lipids Digital Medicine (LDMP): Assign Onboarding Questionnaires (Completed)       Endocrine    Prediabetes    Hyperparathyroidism     Other Visit Diagnoses       Class 1 obesity due to excess calories with serious comorbidity and body mass index (BMI) of 31.0 to 31.9 in adult        Vitamin D deficiency        Relevant Medications    cholecalciferol, vitamin D3, 1,250 mcg (50,000 unit) capsule         1. Hypertension, unspecified type  - Hypertension Digital Medicine (HDMP) Enrollment Order  - BP is well controlled. Continue Hyzaar as prescribed. Continue followup with Cardiology as scheduled. Keep daily BP log. Notify M.D. or ER if BP >170/100 or <90/60, chest pain, palpitations, headache, SOB, temp greater than 100.4, or any acute illness.   Continue  Low Sodium Diet (DASH Diet - Less than 2 grams of sodium per day).  Monitor blood pressure daily and log. Report consistent numbers greater than 140/90.  Smoking cessation encouraged to aid in BP reduction.  Maintain healthy weight with goal BMI <30. Exercise 30 minutes per day, 5 days per week.      2. Essential familial hyperlipidemia  - Lipids Digital Medicine (LDMP) Enrollment Order  - Lipids Digital Medicine (LDMP): Assign Onboarding Questionnaires  - Well controlled, continue Lipitor as prescribed, recheck CMP, FLP, CPK in 05/2024 with wellness labs.     3. Prediabetes  - Diet, exercise, and 10%  weight loss encouraged. Continue Mounjaro 15 mg weekly with close monitoring to help with pre-diabetes/insulin resistance and obesity. Will titrate Rx Mounjaro as needed/tolerated until max dose achieved. Recheck CMP, HgA1C in 05/2024. Notify M.D. or ER if symptoms persist or worsen, adverse Rx side effects, temp greater than 100.4, or any acute illness.     Lab Results   Component Value Date    HGBA1C 5.3 10/12/2023      Continue   Follow ADA Diet. Avoid soda, simple sweets, and limit rice/pasta/breads/starches.  Maintain healthy weight with goal BMI <30.  Exercise 5 times per week for 30 minutes per day.    4. Hyperparathyroidism  - Asymptomatic, continue followup with Endocrinology as scheduled.     5. Class 1 obesity due to excess calories with serious comorbidity and body mass index (BMI) of 31.0 to 31.9 in adult  - Same as #3.   Body mass index is 31.74 kg/m².  Goal BMI <30.  Exercise 5 times a week for 30 minutes per day.  Avoid soda, simple sugars, excessive rice, potatoes or bread. Limit fast foods and fried foods.  Choose complex carbs in moderation (example: green vegetables, beans, oatmeal). Eat plenty of fresh fruits and vegetables with lean meats daily.  Do not skip meals. Eat a balanced portion size.  Avoid fad diets. Consider permanent healthy life style changes.      6. Vitamin D deficiency  - Rx cholecalciferol, vitamin D3, 1,250 mcg (50,000 unit) capsule; Take 1 capsule (50,000 Units total) by mouth every 7 days refilled today; recheck Vitamin D level with annual wellness exam.     Wen was seen today for pre-diabetes.    Diagnoses and all orders for this visit:    Hypertension, unspecified type  -     Hypertension Digital Medicine (HDMP) Enrollment Order    Essential familial hyperlipidemia  -     Lipids Digital Medicine (LDMP) Enrollment Order  -     Lipids Digital Medicine (LDMP): Assign Onboarding Questionnaires    Prediabetes    Hyperparathyroidism    Class 1 obesity due to excess calories  with serious comorbidity and body mass index (BMI) of 31.0 to 31.9 in adult    Vitamin D deficiency  -     cholecalciferol, vitamin D3, 1,250 mcg (50,000 unit) capsule; Take 1 capsule (50,000 Units total) by mouth every 7 days.          Medication List with Changes/Refills   Current Medications    ATORVASTATIN (LIPITOR) 20 MG TABLET    Take 1 tablet (20 mg total) by mouth Daily.       Start Date: 6/22/2023 End Date: --    BUSPIRONE (BUSPAR) 10 MG TABLET    TAKE ONE TABLET BY MOUTH TWICE A DAY       Start Date: 12/22/2023End Date: --    DILTIAZEM (TIAZAC) 240 MG CS24    Take 1 capsule (240 mg total) by mouth once daily.       Start Date: 10/31/2023End Date: 10/30/2024    LEVOCETIRIZINE (XYZAL) 5 MG TABLET    Take 1 tablet (5 mg total) by mouth Daily.       Start Date: 6/22/2023 End Date: 6/21/2024    LOSARTAN-HYDROCHLOROTHIAZIDE 100-25 MG (HYZAAR) 100-25 MG PER TABLET    Take 1 tablet by mouth Daily.       Start Date: 6/22/2023 End Date: --    SUVOREXANT (BELSOMRA) 20 MG TAB    Take 20 mg by mouth nightly as needed (insomnia).       Start Date: 5/10/2023 End Date: --    TIRZEPATIDE, WEIGHT LOSS, (ZEPBOUND) 15 MG/0.5 ML PNIJ    Inject 15 mg into the skin every 7 days.       Start Date: 1/3/2024  End Date: 3/3/2024   Changed and/or Refilled Medications    Modified Medication Previous Medication    CHOLECALCIFEROL, VITAMIN D3, 1,250 MCG (50,000 UNIT) CAPSULE cholecalciferol, vitamin D3, 1,250 mcg (50,000 unit) capsule       Take 1 capsule (50,000 Units total) by mouth every 7 days.    Take 1 capsule (50,000 Units total) by mouth every 7 days.       Start Date: 2/7/2024  End Date: 2/6/2025    Start Date: 7/12/2023 End Date: 2/7/2024          Follow up in about 3 months (around 5/7/2024) for Wellness.

## 2024-02-19 ENCOUNTER — LAB VISIT (OUTPATIENT)
Dept: LAB | Facility: HOSPITAL | Age: 69
End: 2024-02-19
Attending: FAMILY MEDICINE
Payer: MEDICARE

## 2024-02-19 DIAGNOSIS — R79.89 HIGH SERUM TOTAL PROTEIN LEVEL: ICD-10-CM

## 2024-02-19 DIAGNOSIS — R77.1 ELEVATED SERUM GLOBULIN LEVEL: ICD-10-CM

## 2024-02-19 PROCEDURE — 36415 COLL VENOUS BLD VENIPUNCTURE: CPT

## 2024-02-19 PROCEDURE — 84165 PROTEIN E-PHORESIS SERUM: CPT

## 2024-02-19 PROCEDURE — 86225 DNA ANTIBODY NATIVE: CPT

## 2024-02-20 LAB — PATH REV: NORMAL

## 2024-02-21 LAB
ANTINUCLEAR ANTIBODY SCREEN (OHS): NEGATIVE
DSDNA AB QUANT (OHS): <0.6 IU/ML

## 2024-02-22 LAB
ALBUMIN % SPEP (OHS): 48.96 (ref 48.1–59.5)
ALBUMIN SERPL BCP-MCNC: 3.6 G/DL
ALBUMIN/GLOB SERPL: 0.9 RATIO
ALPHA 1 GLOB (OHS): 0.27 GM/DL (ref 0–0.4)
ALPHA 1 GLOB% (OHS): 3.61 (ref 2.3–4.9)
ALPHA 2 GLOB % (OHS): 12.96 (ref 6.9–13)
ALPHA 2 GLOB (OHS): 0.96 GM/DL (ref 0.4–1)
BETA GLOB (OHS): 1.22 GM/DL (ref 0.7–1.3)
BETA GLOB% (OHS): 16.42 (ref 13.8–19.7)
GAMMA GLOBULIN % (OHS): 18.04 (ref 10.1–21.9)
GAMMA GLOBULIN (OHS): 1.34 GM/DL (ref 0.4–1.8)
GLOBULIN SER-MCNC: 3.8 GM/DL
M SPIKE % (OHS): NORMAL
M SPIKE (OHS): NORMAL
PROT SERPL-MCNC: 7.4 GM/DL (ref 5.8–7.6)

## 2024-02-23 DIAGNOSIS — E55.9 VITAMIN D DEFICIENCY: ICD-10-CM

## 2024-02-26 ENCOUNTER — TELEPHONE (OUTPATIENT)
Dept: FAMILY MEDICINE | Facility: CLINIC | Age: 69
End: 2024-02-26
Payer: MEDICARE

## 2024-02-26 RX ORDER — ASPIRIN 325 MG
50000 TABLET, DELAYED RELEASE (ENTERIC COATED) ORAL
Qty: 4 CAPSULE | Refills: 3 | Status: SHIPPED | OUTPATIENT
Start: 2024-02-26

## 2024-02-26 NOTE — TELEPHONE ENCOUNTER
----- Message from Marianela Ulrich MD sent at 2/20/2024  4:59 PM CST -----  Serum protein electrophoresis is normal and negative for M spike.

## 2024-02-26 NOTE — TELEPHONE ENCOUNTER
----- Message from Marianela Ulrich MD sent at 2/21/2024  1:07 PM CST -----  Antinuclear antibody testing is negative for auto-immune disease.

## 2024-03-04 ENCOUNTER — PATIENT MESSAGE (OUTPATIENT)
Dept: FAMILY MEDICINE | Facility: CLINIC | Age: 69
End: 2024-03-04

## 2024-03-04 ENCOUNTER — TELEPHONE (OUTPATIENT)
Dept: FAMILY MEDICINE | Facility: CLINIC | Age: 69
End: 2024-03-04
Payer: MEDICARE

## 2024-03-04 ENCOUNTER — E-VISIT (OUTPATIENT)
Dept: FAMILY MEDICINE | Facility: CLINIC | Age: 69
End: 2024-03-04
Payer: MEDICARE

## 2024-03-04 DIAGNOSIS — R30.0 DYSURIA: Primary | ICD-10-CM

## 2024-03-04 PROCEDURE — 99421 OL DIG E/M SVC 5-10 MIN: CPT | Mod: ,,, | Performed by: FAMILY MEDICINE

## 2024-03-04 RX ORDER — PHENAZOPYRIDINE HYDROCHLORIDE 200 MG/1
200 TABLET, FILM COATED ORAL 3 TIMES DAILY PRN
Qty: 6 TABLET | Refills: 0 | Status: SHIPPED | OUTPATIENT
Start: 2024-03-04

## 2024-03-04 RX ORDER — CIPROFLOXACIN 500 MG/1
500 TABLET ORAL EVERY 12 HOURS
Qty: 14 TABLET | Refills: 0 | Status: SHIPPED | OUTPATIENT
Start: 2024-03-04 | End: 2024-03-11

## 2024-03-04 NOTE — PROGRESS NOTES
Patient ID: Wen Otoole is a 68 y.o. female.    Chief Complaint: Urinary Tract Infection (Entered automatically based on patient selection in Patient Portal.)    The patient initiated a request through [employer] on 3/4/2024 for evaluation and management with a chief complaint of Urinary Tract Infection (Entered automatically based on patient selection in Patient Portal.)     I evaluated the questionnaire submission on 03/04/2024.    [facility] Peq Evisit Uti Questionnaire    3/4/2024 11:40 AM CST - Filed by Patient   Do you agree to participate in an E-Visit? Yes   If you have any of the following problems, please present to your local ER or call 911:  I acknowledge   What is the main issue that you would like for your doctor to address today? Bladder full with a little discomfort   Are you able to take your vital signs? No   What symptoms do you currently have? Pain while passing urine   When did your symptoms first appear? 2/18/2024   List what you have done or taken to help your symptoms. None   Please indicate whether you have had the following symptoms during the past 24 hours     Urgent urination (a sudden and uncontrollable urge to urinate) Moderate   Frequent urination of small amounts of urine (going to the toilet very often) Moderate   Burning pain when urinating Moderate   Incomplete bladder emptying (still feel like you need to urinate again after urination) Moderate   Pain not associated with urination in the lower abdomen below the belly button) None   What does your urine look like? I am not sure   Blood seen in the urine None   Flank pain (pain in one or both sides of the lower back) None   Abnormal Vaginal Discharge (abnormal amount, color and/or odor) None   Discharge from the urethra (urinary opening) without urination None   High body temperature/fever? None-<99.5   Please rate how much discomfort you have experience because of the symptoms in the past 24 hours: Mild   Please indicate how the symptoms  have interfered with your every day activities/work in the past 24 hours: Mild   Please indicate how these symptoms have interfered with your social activities (visiting people, meeting with friends, etc.) in the past 24 hours? Mild   Are you a diabetic? Yes   Please indicate whether you have the following at the time of completion of this questionnaire: None of the above   Provide any information you feel is important to your history not asked above    Please attach any relevant images or files (if you have performed a home test for UTI, please submit a photo of results)          Encounter Diagnosis   Name Primary?    Dysuria Yes        Orders Placed This Encounter   Procedures    Urine Culture High Risk           Standing Status:   Future     Standing Expiration Date:   6/2/2025    Urinalysis, Reflex to Urine Culture     Order Specific Question:   Specimen Source     Answer:   Urine      Medications Ordered This Encounter   Medications    ciprofloxacin HCl (CIPRO) 500 MG tablet     Sig: Take 1 tablet (500 mg total) by mouth every 12 (twelve) hours. for 7 days     Dispense:  14 tablet     Refill:  0    phenazopyridine (PYRIDIUM) 200 MG tablet     Sig: Take 1 tablet (200 mg total) by mouth 3 (three) times daily as needed for Pain.     Dispense:  6 tablet     Refill:  0      I ordered a urinalysis and urine culture to evaluate for possible urinary tract infection, she can have this testing done at any [facility] facility of her choice and at her earliest convenience, except for out Urgent Care Centers. I recommend she have this done prior to starting the medication that way if she has a bladder infection, I can capture it before the antibiotics mask a possible infection. I did send a prescription for an antibiotic called Cipro and Pyridium to help ease her bladder tract. UTI precautions encouraged. The Pyridium may change the color of your urine to orange or blue, please do not be alarmed as this is coming from the  medication. Increase fluid intake. Notify M.D. or ER if symptoms persist or worsen, abdominal pain, bloody urine, vomiting, vaginal discharge, temp >100.4, or any acute illness.      Follow up if symptoms worsen or fail to improve.      E-Visit Time Tracking:    Day 1 Time (in minutes): 7    Total Time (in minutes): 7

## 2024-03-04 NOTE — TELEPHONE ENCOUNTER
----- Message from Mariposa Noel sent at 3/4/2024 10:51 AM CST -----  Regarding: advice  .Type:  Needs Medical Advice    Who Called: PT    Symptoms (please be specific): fbladder fullness, bladder pain after urinating     How long has patient had these symptoms:  1 week    Pharmacy name and phone #:  [id number] at [address] [place]   Phone: [phone]  Fax: [phone]        Would the patient rather a call back or a response via [facility]?     Best Call Back Number: [phone]    Additional Information: Please advise. Thanks.

## 2024-03-13 ENCOUNTER — DOCUMENTATION ONLY (OUTPATIENT)
Dept: FAMILY MEDICINE | Facility: CLINIC | Age: 69
End: 2024-03-13
Payer: MEDICARE

## 2024-03-13 LAB — BCS RECOMMENDATION EXT: NORMAL

## 2024-03-25 ENCOUNTER — TELEPHONE (OUTPATIENT)
Dept: FAMILY MEDICINE | Facility: CLINIC | Age: 69
End: 2024-03-25
Payer: MEDICARE

## 2024-03-25 DIAGNOSIS — R73.03 PREDIABETES: Primary | ICD-10-CM

## 2024-03-25 DIAGNOSIS — E66.09 CLASS 1 OBESITY DUE TO EXCESS CALORIES WITH SERIOUS COMORBIDITY AND BODY MASS INDEX (BMI) OF 31.0 TO 31.9 IN ADULT: ICD-10-CM

## 2024-03-25 NOTE — TELEPHONE ENCOUNTER
Pt takes Mounjaro 15mg, she states she has called around and medication is on backorder. Her pharmacy does have Mounjaro 10mg, she asked if she could do that yuntil 15mg is back in stock. Please advise Thanks

## 2024-03-25 NOTE — TELEPHONE ENCOUNTER
----- Message from Elenita Arauz sent at 3/25/2024 10:00 AM CDT -----  Type:  Needs Medical Advice    Who Called: pt     Best Call Back Number:  [phone]  Additional Information: pt called stated mounjaro is on back order , asked for a call to discuss alternate options

## 2024-04-27 ENCOUNTER — PATIENT MESSAGE (OUTPATIENT)
Dept: ADMINISTRATIVE | Facility: OTHER | Age: 69
End: 2024-04-27
Payer: MEDICARE

## 2024-05-21 ENCOUNTER — DOCUMENTATION ONLY (OUTPATIENT)
Dept: ADMINISTRATIVE | Facility: HOSPITAL | Age: 69
End: 2024-05-21
Payer: MEDICARE

## 2024-06-05 ENCOUNTER — TELEPHONE (OUTPATIENT)
Dept: FAMILY MEDICINE | Facility: CLINIC | Age: 69
End: 2024-06-05
Payer: MEDICARE

## 2024-06-05 DIAGNOSIS — E66.09 CLASS 1 OBESITY DUE TO EXCESS CALORIES WITH SERIOUS COMORBIDITY AND BODY MASS INDEX (BMI) OF 31.0 TO 31.9 IN ADULT: ICD-10-CM

## 2024-06-05 DIAGNOSIS — R73.03 PREDIABETES: ICD-10-CM

## 2024-06-05 NOTE — TELEPHONE ENCOUNTER
----- Message from Kelly Ortiz sent at 6/5/2024  2:36 PM CDT -----  Regarding: refill  .Type:  RX Refill Request    Who Called: pt  Refill or New Rx:refill  RX Name and Strength:tirzepatide 10 mg/0.5 mL PnIj  How is the patient currently taking it? (ex. 1XDay): Inject 10 mg into the skin every 7 days. - Subcutaneous  Is this a 30 day or 90 day RX:  Preferred Pharmacy with phone number:[id number] AT [place] [address] [address] AT   Local or Mail Order:  Ordering Provider:  Would the patient rather a call back or a response via [facility]?   Best Call Back Number:[phone]  Additional Information: advise when script is sent to phar

## 2024-06-07 ENCOUNTER — TELEPHONE (OUTPATIENT)
Dept: FAMILY MEDICINE | Facility: CLINIC | Age: 69
End: 2024-06-07
Payer: MEDICARE

## 2024-06-07 DIAGNOSIS — E66.09 CLASS 1 OBESITY DUE TO EXCESS CALORIES WITH SERIOUS COMORBIDITY AND BODY MASS INDEX (BMI) OF 31.0 TO 31.9 IN ADULT: Primary | ICD-10-CM

## 2024-06-07 DIAGNOSIS — E66.09 CLASS 1 OBESITY DUE TO EXCESS CALORIES WITH SERIOUS COMORBIDITY AND BODY MASS INDEX (BMI) OF 31.0 TO 31.9 IN ADULT: ICD-10-CM

## 2024-06-07 DIAGNOSIS — R73.03 PREDIABETES: ICD-10-CM

## 2024-06-07 NOTE — TELEPHONE ENCOUNTER
----- Message from Arcelia Prince sent at 6/6/2024  2:25 PM CDT -----  .Type:  Patient Returning Call    Who Called:pt  Who Left Message for Patient:pt  Does the patient know what this is regarding?:tirzepatide 10 mg/0.5 mL PnIj  Would the patient rather a call back or a response via [facility]?   Best Call Back Number:[phone]   Additional Information: Please call back about mounjaro. I did tell the patient this medication was called in tirzepatide 10 mg/0.5 mL PnIj but she said she wanted the mounjaro

## 2024-06-07 NOTE — TELEPHONE ENCOUNTER
----- Message from Herlinda Moreno sent at 6/7/2024  9:31 AM CDT -----  Regarding: refill  Who Called: Wen Otoole    Refill or New Rx:Refill  RX Name and Strength:tirzepatide 10 mg/0.5 mL PnIj  How is the patient currently taking it? (ex. 1XDay):every 7  Is this a 30 day or 90 day RX:30  Local or Mail Order:local  List of preferred pharmacies on file (remove unneeded): [unfilled]  If different Pharmacy is requested, enter Pharmacy information here including location and phone number:  [facility]   Ordering Provider:      Preferred Method of Contact: Phone Call  Patient's Preferred Phone Number on File: [phone]   Best Call Back Number, if different:  Additional Information: pt states its for the 12.5

## 2024-06-07 NOTE — TELEPHONE ENCOUNTER
Pt will call back after speaking with her pharmacy, she is checking to see which dose of tirzepatide is in stock

## 2024-06-11 DIAGNOSIS — H10.10 ALLERGIC CONJUNCTIVITIS, UNSPECIFIED LATERALITY: ICD-10-CM

## 2024-06-11 DIAGNOSIS — F41.9 ANXIETY DISORDER, UNSPECIFIED: ICD-10-CM

## 2024-06-11 RX ORDER — LEVOCETIRIZINE DIHYDROCHLORIDE 5 MG/1
5 TABLET, FILM COATED ORAL DAILY
Qty: 90 TABLET | Refills: 3 | Status: SHIPPED | OUTPATIENT
Start: 2024-06-11 | End: 2025-06-11

## 2024-06-11 RX ORDER — BUSPIRONE HYDROCHLORIDE 10 MG/1
10 TABLET ORAL 2 TIMES DAILY
Qty: 60 TABLET | Refills: 2 | Status: SHIPPED | OUTPATIENT
Start: 2024-06-11 | End: 2024-06-19

## 2024-06-11 NOTE — TELEPHONE ENCOUNTER
----- Message from Alise Montana sent at 6/11/2024 11:03 AM CDT -----  Regarding: Refill Request  .Who Called: Wen Flugence    Refill or New Rx:Refill  RX Name and Strength:busPIRone (BUSPAR) 10 MG tablet  How is the patient currently taking it? (ex. 1XDay):1x  Is this a 30 day or 90 day RX:30  Local or Mail Order:local   List of preferred pharmacies on file (remove unneeded): [unfilled]  If different Pharmacy is requested, enter Pharmacy information here including location and phone number:  [id number] AT [place] [address] [address] AT   Ordering Provider:MAXWELL Ulrich       Preferred Method of Contact: Phone Call  Patient's Preferred Phone Number on File: [phone]   Best Call Back Number, if different:  Additional Information: refill request     .Who Called: Wen Flugence    Refill or New Rx:Refill  RX Name and Strength:levocetirizine (XYZAL) 5 MG tablet  How is the patient currently taking it? (ex. 1XDay):1x  Is this a 30 day or 90 day RX:30  Local or Mail Order:local   List of preferred pharmacies on file (remove unneeded): [unfilled]  If different Pharmacy is requested, enter Pharmacy information here including location and phone number: [facility] #[id number] AT [address]     Ordering Provider:maxwell Ulrich       Preferred Method of Contact: Phone Call  Patient's Preferred Phone Number on File: [phone]   Best Call Back Number, if different:  Additional Information: refill request

## 2024-06-13 ENCOUNTER — TELEPHONE (OUTPATIENT)
Dept: FAMILY MEDICINE | Facility: CLINIC | Age: 69
End: 2024-06-13
Payer: MEDICARE

## 2024-06-13 DIAGNOSIS — Z00.00 WELLNESS EXAMINATION: ICD-10-CM

## 2024-06-13 DIAGNOSIS — R73.03 PREDIABETES: Primary | ICD-10-CM

## 2024-06-18 ENCOUNTER — LAB VISIT (OUTPATIENT)
Dept: LAB | Facility: HOSPITAL | Age: 69
End: 2024-06-18
Attending: FAMILY MEDICINE
Payer: MEDICARE

## 2024-06-18 DIAGNOSIS — Z00.00 WELLNESS EXAMINATION: ICD-10-CM

## 2024-06-18 DIAGNOSIS — R73.03 PREDIABETES: ICD-10-CM

## 2024-06-18 LAB
ALBUMIN SERPL-MCNC: 4.1 G/DL (ref 3.4–4.8)
ALBUMIN/GLOB SERPL: 1.1 RATIO (ref 1.1–2)
ALP SERPL-CCNC: 81 UNIT/L (ref 40–150)
ALT SERPL-CCNC: 23 UNIT/L (ref 0–55)
ANION GAP SERPL CALC-SCNC: 9 MEQ/L
AST SERPL-CCNC: 18 UNIT/L (ref 5–34)
BACTERIA #/AREA URNS AUTO: ABNORMAL /HPF
BASOPHILS # BLD AUTO: 0.03 X10(3)/MCL
BASOPHILS NFR BLD AUTO: 0.5 %
BILIRUB SERPL-MCNC: 0.7 MG/DL
BILIRUB UR QL STRIP.AUTO: NEGATIVE
BUN SERPL-MCNC: 22.3 MG/DL (ref 9.8–20.1)
CALCIUM SERPL-MCNC: 10 MG/DL (ref 8.4–10.2)
CHLORIDE SERPL-SCNC: 106 MMOL/L (ref 98–107)
CHOLEST SERPL-MCNC: 226 MG/DL
CHOLEST/HDLC SERPL: 3 {RATIO} (ref 0–5)
CLARITY UR: ABNORMAL
CO2 SERPL-SCNC: 27 MMOL/L (ref 23–31)
COLOR UR AUTO: ABNORMAL
CREAT SERPL-MCNC: 1.07 MG/DL (ref 0.55–1.02)
CREAT/UREA NIT SERPL: 21
EOSINOPHIL # BLD AUTO: 0.25 X10(3)/MCL (ref 0–0.9)
EOSINOPHIL NFR BLD AUTO: 4.1 %
ERYTHROCYTE [DISTWIDTH] IN BLOOD BY AUTOMATED COUNT: 14.4 % (ref 11.5–17)
EST. AVERAGE GLUCOSE BLD GHB EST-MCNC: 102.5 MG/DL
GFR SERPLBLD CREATININE-BSD FMLA CKD-EPI: 56 ML/MIN/1.73/M2
GLOBULIN SER-MCNC: 3.8 GM/DL (ref 2.4–3.5)
GLUCOSE SERPL-MCNC: 87 MG/DL (ref 82–115)
GLUCOSE UR QL STRIP: NEGATIVE
HBA1C MFR BLD: 5.2 %
HCT VFR BLD AUTO: 45.1 % (ref 37–47)
HDLC SERPL-MCNC: 90 MG/DL (ref 35–60)
HGB BLD-MCNC: 15 G/DL (ref 12–16)
HGB UR QL STRIP: ABNORMAL
IMM GRANULOCYTES # BLD AUTO: 0.01 X10(3)/MCL (ref 0–0.04)
IMM GRANULOCYTES NFR BLD AUTO: 0.2 %
KETONES UR QL STRIP: NEGATIVE
LDLC SERPL CALC-MCNC: 122 MG/DL (ref 50–140)
LEUKOCYTE ESTERASE UR QL STRIP: ABNORMAL
LYMPHOCYTES # BLD AUTO: 2.61 X10(3)/MCL (ref 0.6–4.6)
LYMPHOCYTES NFR BLD AUTO: 42.8 %
MCH RBC QN AUTO: 29.8 PG (ref 27–31)
MCHC RBC AUTO-ENTMCNC: 33.3 G/DL (ref 33–36)
MCV RBC AUTO: 89.5 FL (ref 80–94)
MONOCYTES # BLD AUTO: 0.47 X10(3)/MCL (ref 0.1–1.3)
MONOCYTES NFR BLD AUTO: 7.7 %
MUCOUS THREADS URNS QL MICRO: ABNORMAL /LPF
NEUTROPHILS # BLD AUTO: 2.73 X10(3)/MCL (ref 2.1–9.2)
NEUTROPHILS NFR BLD AUTO: 44.7 %
NITRITE UR QL STRIP: NEGATIVE
NRBC BLD AUTO-RTO: 0 %
PH UR STRIP: 7 [PH]
PLATELET # BLD AUTO: 235 X10(3)/MCL (ref 130–400)
PMV BLD AUTO: 9.8 FL (ref 7.4–10.4)
POTASSIUM SERPL-SCNC: 3.7 MMOL/L (ref 3.5–5.1)
PROT SERPL-MCNC: 7.9 GM/DL (ref 5.8–7.6)
PROT UR QL STRIP: NEGATIVE
RBC # BLD AUTO: 5.04 X10(6)/MCL (ref 4.2–5.4)
RBC #/AREA URNS AUTO: ABNORMAL /HPF
SODIUM SERPL-SCNC: 142 MMOL/L (ref 136–145)
SP GR UR STRIP.AUTO: 1.01 (ref 1–1.03)
SQUAMOUS #/AREA URNS AUTO: ABNORMAL /HPF
TRIGL SERPL-MCNC: 68 MG/DL (ref 37–140)
TSH SERPL-ACNC: 2.11 UIU/ML (ref 0.35–4.94)
UROBILINOGEN UR STRIP-ACNC: 0.2
VLDLC SERPL CALC-MCNC: 14 MG/DL
WBC # BLD AUTO: 6.1 X10(3)/MCL (ref 4.5–11.5)
WBC #/AREA URNS AUTO: ABNORMAL /HPF

## 2024-06-18 PROCEDURE — 80053 COMPREHEN METABOLIC PANEL: CPT

## 2024-06-18 PROCEDURE — 83036 HEMOGLOBIN GLYCOSYLATED A1C: CPT

## 2024-06-18 PROCEDURE — 84443 ASSAY THYROID STIM HORMONE: CPT

## 2024-06-18 PROCEDURE — 36415 COLL VENOUS BLD VENIPUNCTURE: CPT

## 2024-06-18 PROCEDURE — 85025 COMPLETE CBC W/AUTO DIFF WBC: CPT

## 2024-06-18 PROCEDURE — 81003 URINALYSIS AUTO W/O SCOPE: CPT

## 2024-06-18 PROCEDURE — 80061 LIPID PANEL: CPT

## 2024-06-19 ENCOUNTER — OFFICE VISIT (OUTPATIENT)
Dept: FAMILY MEDICINE | Facility: CLINIC | Age: 69
End: 2024-06-19
Payer: MEDICARE

## 2024-06-19 VITALS
OXYGEN SATURATION: 97 % | HEIGHT: 61 IN | HEART RATE: 69 BPM | DIASTOLIC BLOOD PRESSURE: 75 MMHG | BODY MASS INDEX: 31.28 KG/M2 | WEIGHT: 165.69 LBS | SYSTOLIC BLOOD PRESSURE: 124 MMHG

## 2024-06-19 DIAGNOSIS — E66.9 OBESITY (BMI 30-39.9): ICD-10-CM

## 2024-06-19 DIAGNOSIS — R73.03 PREDIABETES: ICD-10-CM

## 2024-06-19 DIAGNOSIS — Z00.00 MEDICARE ANNUAL WELLNESS VISIT, SUBSEQUENT: Primary | ICD-10-CM

## 2024-06-19 DIAGNOSIS — I10 PRIMARY HYPERTENSION: ICD-10-CM

## 2024-06-19 DIAGNOSIS — E78.49 ESSENTIAL FAMILIAL HYPERLIPIDEMIA: ICD-10-CM

## 2024-06-19 DIAGNOSIS — F41.1 GENERALIZED ANXIETY DISORDER: ICD-10-CM

## 2024-06-19 PROCEDURE — 3044F HG A1C LEVEL LT 7.0%: CPT | Mod: CPTII,,,

## 2024-06-19 PROCEDURE — 3074F SYST BP LT 130 MM HG: CPT | Mod: CPTII,,,

## 2024-06-19 PROCEDURE — 3078F DIAST BP <80 MM HG: CPT | Mod: CPTII,,,

## 2024-06-19 PROCEDURE — 1101F PT FALLS ASSESS-DOCD LE1/YR: CPT | Mod: CPTII,,,

## 2024-06-19 PROCEDURE — 1126F AMNT PAIN NOTED NONE PRSNT: CPT | Mod: CPTII,,,

## 2024-06-19 PROCEDURE — 3288F FALL RISK ASSESSMENT DOCD: CPT | Mod: CPTII,,,

## 2024-06-19 PROCEDURE — G0439 PPPS, SUBSEQ VISIT: HCPCS | Mod: ,,,

## 2024-06-19 PROCEDURE — 1159F MED LIST DOCD IN RCRD: CPT | Mod: CPTII,,,

## 2024-06-19 PROCEDURE — 1160F RVW MEDS BY RX/DR IN RCRD: CPT | Mod: CPTII,,,

## 2024-06-19 NOTE — PROGRESS NOTES
Patient ID: [id number]     Chief Complaint: Medicare Annual Wellness, Subseq    HPI:     Wen Otoole is a 68 y.o. female here today for a Medicare Annual Wellness visit. The patient presents for well adult exam. The patient's general health status is described as good. The patient's diet is described as balanced. Exercise: walking. Caffeine use consist of 1 cup of coffee daily. Tobacco us none. Reports quitting >15 yrs ago, no relapse. Alcohol use is occasional wine.Seatbelt use always.  Cervical Cancer Screening - Last pap 4/2024. Result normal. Follow up annually.  Breast Cancer Screening - Last Mammogram 3/2024, Result normal. Repeat 3/2025.  Colon Cancer Screening - Last colonoscopy 9/2020. Resulted polyp removal. Follow up in 5 years.  Osteoporosis Screening - Last DEXA 3/2023. Repeat 2025.   Eye Exam - Last eye exam 2/2024. Wears contacts.   Dental Exam - Last dental exam 6 mouths ago.  Vaccinations - UTD    HTN/cholesterol is stable and asymptomatic with current Rx and followed by Cardiology (Dr. Vidal). She has a history of fatty liver. She is compliant with diet, asymptomatic.    She is obese and pre-diabetic. She is managing prediabetes with mounjaro. She tolerating medication well and denies medication side effect. She denies family history of medullary thyroid cancer, or MEN II, or personal history of pancreatitis. She is not interested in surgical weight loss or seeing a dietician.     Patient reports increase in anxiety over the past few months. Associated symptom of increased worry. She denies exacerbation of anxiety. She denies SI/HI. She would to increase her anxiety medication at this time.     Advance Care Planning  Date: 6/19/2024  Patient did not wish or was not able to name a surrogate decision maker or provide an Advance Care Plan.    A separate E/M code has been provided to evaluate additional complaints that the patient would like addressed during the dedicated Medicare Wellness  Exam.    Health Maintenance   Topic Date Due    TETANUS VACCINE  02/07/2025 (Originally 10/8/1973)    Shingles Vaccine (1 of 2) 02/07/2025 (Originally 10/8/2004)    DEXA Scan  03/09/2025    Mammogram  03/12/2025    Colorectal Cancer Screening  09/23/2025    Lipid Panel  06/18/2029    Hepatitis C Screening  Completed        Past Medical History:   Diagnosis Date    Binge eating disorder     Generalized anxiety disorder     Hypertensive disorder     Insomnia     Obesity, unspecified     BENIGNO (obstructive sleep apnea)     Personal history of colonic polyps         Past Surgical History:   Procedure Laterality Date    BACK SURGERY      CHOLECYSTECTOMY      COLONOSCOPY  09/23/2020    KNEE ARTHROSCOPY      LUMBAR FUSION      NECK SURGERY          Social History     Socioeconomic History    Marital status: Single   Tobacco Use    Smoking status: Former     Types: Cigarettes     Passive exposure: Past    Smokeless tobacco: Never   Substance and Sexual Activity    Alcohol use: Never    Drug use: Never    Sexual activity: Not Currently     Partners: Female     Birth control/protection: None     Social Determinants of Health     Financial Resource Strain: Low Risk  (6/19/2024)    Overall Financial Resource Strain (CARDIA)     Difficulty of Paying Living Expenses: Not very hard   Food Insecurity: No Food Insecurity (6/19/2024)    Hunger Vital Sign     Worried About Running Out of Food in the Last Year: Never true     Ran Out of Food in the Last Year: Never true   Transportation Needs: No Transportation Needs (6/19/2024)    TRANSPORTATION NEEDS     Transportation : No   Physical Activity: Insufficiently Active (6/19/2024)    Exercise Vital Sign     Days of Exercise per Week: 2 days     Minutes of Exercise per Session: 60 min   Stress: No Stress Concern Present (6/19/2024)    [employer] [place] [employer] - Occupational Stress Questionnaire     Feeling of Stress : Only a little   Housing Stability: Low Risk  (6/19/2024)     Housing Stability Vital Sign     Unable to Pay for Housing in the Last Year: No     Homeless in the Last Year: No        Family History   Problem Relation Name Age of Onset    Hypertension Mother Magnolia     Diabetes Mother Magnolia     Coronary artery disease Mother Magnolia     Coronary artery disease Brother          Current Outpatient Medications   Medication Instructions    atorvastatin (LIPITOR) 20 mg, Oral, Daily    busPIRone (BUSPAR) 15 mg, Oral, 2 times daily    cholecalciferol (vitamin D3) 50,000 Units, Oral, Every 7 days    diltiaZEM (TIAZAC) 240 mg, Oral, Daily    levocetirizine (XYZAL) 5 mg, Oral, Daily    losartan-hydrochlorothiazide 100-25 mg (HYZAAR) 100-25 mg per tablet 1 tablet, Oral, Daily    phenazopyridine (PYRIDIUM) 200 mg, Oral, 3 times daily PRN    suvorexant (BELSOMRA) 20 mg, Oral, Nightly PRN    tirzepatide 10 mg, Subcutaneous, Every 7 days    tirzepatide 12.5 mg, Subcutaneous, Every 7 days       Review of patient's allergies indicates:  No Known Allergies     Immunization History   Administered Date(s) Administered    COVID-19, MRNA, LN-S, PF (Pfizer) (Purple Cap) 01/20/2022    COVID-19, vector-nr, rS-Ad26, PF ([employer]) 03/05/2021        Patient Care Team:  Marianela Ulrich MD as PCP - General (Family Medicine)  Cesar Tariq MD as Consulting Physician (Gastroenterology)  [facility] -    Subjective:     Review of Systems   Constitutional: Negative.  Negative for activity change, appetite change, chills, fatigue, fever and unexpected weight change.   HENT:  Negative for congestion, dental problem, ear pain, hearing loss, nosebleeds, rhinorrhea, sinus pressure, sneezing, sore throat and tinnitus.    Eyes:  Negative for pain and visual disturbance.   Respiratory:  Negative for cough, chest tightness, shortness of breath and wheezing.    Cardiovascular:  Negative for chest pain, palpitations and leg swelling.   Gastrointestinal:  Negative for abdominal pain, blood  "in stool, constipation, diarrhea, nausea and vomiting.   Endocrine: Negative for cold intolerance, heat intolerance, polydipsia, polyphagia and polyuria.   Genitourinary:  Negative for difficulty urinating, dysuria, frequency, hematuria, urgency, vaginal bleeding and vaginal pain.   Musculoskeletal:  Negative for arthralgias, back pain, joint swelling, myalgias and neck stiffness.   Skin: Negative.    Neurological:  Negative for dizziness, syncope, weakness, light-headedness and headaches.   Hematological: Negative.    Psychiatric/Behavioral:  Negative for agitation, confusion, decreased concentration, hallucinations, self-injury, sleep disturbance and suicidal ideas. The patient is nervous/anxious. The patient is not hyperactive.      12 point review of systems conducted, negative except as stated in the history of present illness. See HPI for details.    Objective:     Visit Vitals  /75 (BP Location: Right arm, Patient Position: Sitting, BP Method: Medium (Automatic))   Pulse 69   Ht 5' 1" (1.549 m)   Wt 75.2 kg (165 lb 11.2 oz)   SpO2 97%   BMI 31.31 kg/m²       Physical Exam  Constitutional:       General: She is not in acute distress.     Appearance: She is obese. She is not ill-appearing.   HENT:      Head: Normocephalic.      Right Ear: Tympanic membrane, ear canal and external ear normal.      Left Ear: Tympanic membrane, ear canal and external ear normal.      Mouth/Throat:      Mouth: Mucous membranes are moist.      Pharynx: Oropharynx is clear.   Eyes:      Conjunctiva/sclera: Conjunctivae normal.      Pupils: Pupils are equal, round, and reactive to light.   Cardiovascular:      Rate and Rhythm: Normal rate and regular rhythm.      Pulses: Normal pulses.      Heart sounds: Normal heart sounds.   Pulmonary:      Effort: Pulmonary effort is normal.      Breath sounds: Normal breath sounds.   Abdominal:      General: Bowel sounds are normal.      Palpations: Abdomen is soft.   Musculoskeletal:        "  General: Normal range of motion.      Cervical back: Normal range of motion.   Lymphadenopathy:      Cervical: No cervical adenopathy.   Skin:     General: Skin is warm and dry.      Capillary Refill: Capillary refill takes less than 2 seconds.   Neurological:      General: No focal deficit present.      Mental Status: She is alert and oriented to person, place, and time. Mental status is at baseline.   Psychiatric:         Mood and Affect: Mood normal.         Behavior: Behavior normal.         Thought Content: Thought content normal.         Judgment: Judgment normal.       Labs Reviewed:     Chemistry:  Lab Results   Component Value Date     06/18/2024    K 3.7 06/18/2024    BUN 22.3 (H) 06/18/2024    CREATININE 1.07 (H) 06/18/2024    EGFRNORACEVR 56 06/18/2024    GLUCOSE 87 06/18/2024    CALCIUM 10.0 06/18/2024    ALKPHOS 81 06/18/2024    LABPROT 7.9 (H) 06/18/2024    ALBUMIN 4.1 06/18/2024    BILIDIR 0.3 05/26/2021    IBILI 0.40 05/26/2021    AST 18 06/18/2024    ALT 23 06/18/2024    JBCRMHHM84TP 48.1 03/17/2023    TSH 2.114 06/18/2024        Lab Results   Component Value Date    HGBA1C 5.2 06/18/2024        Hematology:  Lab Results   Component Value Date    WBC 6.10 06/18/2024    HGB 15.0 06/18/2024    HCT 45.1 06/18/2024     06/18/2024       Lipid Panel:  Lab Results   Component Value Date    CHOL 226 (H) 06/18/2024    HDL 90 (H) 06/18/2024    .00 06/18/2024    TRIG 68 06/18/2024    TOTALCHOLEST 3 06/18/2024        Urine:  Lab Results   Component Value Date    APPEARANCEUA Hazy (A) 06/18/2024    SGUA 1.015 06/18/2024    PROTEINUA Negative 06/18/2024    KETONESUA Negative 06/18/2024    LEUKOCYTESUR Trace (A) 06/18/2024    RBCUA 0-2 06/18/2024    WBCUA 0-2 06/18/2024    BACTERIA Few (A) 06/18/2024        Assessment:       ICD-10-CM ICD-9-CM   1. Medicare annual wellness visit, subsequent  Z00.00 V70.0   2. Primary hypertension  I10 401.9   3. Essential familial hyperlipidemia  E78.49 272.2    4. Prediabetes  R73.03 790.29   5. Obesity (BMI 30-39.9)  E66.9 278.00   6. Generalized anxiety disorder  F41.1 300.02        Plan:     1. Medicare annual wellness visit, subsequent  Assessment & Plan:  Monthly self breast exam encouraged.  Continue annual pelvic exam, eye exam and biannual dental exam.        2. Primary hypertension  Assessment & Plan:  Stable.  Continue diltiaZEM (TIAZAC) 240 MG and losartan-hydrochlorothiazide 100-25 mg (HYZAAR) 100-25 mg per tablet daily.  Low Sodium Diet (DASH Diet - Less than 2 grams of sodium per day).  Monitor blood pressure daily and log. Report consistent numbers greater than 140/90.  Continue smoking cessation; encouraged to aid in BP reduction.  Goal BMI <30. Exercise 30 minutes per day, 5 days per week.   Notify M.D. or ER if BP >170/100 or <90/60, chest pain, palpitations, headache, SOB, temp greater than 100.4, or any acute illness.        3. Essential familial hyperlipidemia  Assessment & Plan:  Stable.  Continue atorvastatin (LIPITOR) 20 MG 1 tablet every evening.  Notify the provider if you experience abdominal pain, muscle aches or cramps.  Follow low cholesterol, low fat diet. Avoid fried foods and high saturated fats.  Increase fiber intake. Foods high in soluble fiber, such as oats, beans, lentils, fruits, and vegetables, can help lower LDL cholesterol levels.   Exercise/walk 5 times per week for 30 minutes a day. Regular physical activity can help raise HDL (high-density lipoprotein) cholesterol and lower LDL cholesterol.             4. Prediabetes  Assessment & Plan:  A1C improving  Continue tirzepatide 12.5 mg/0.5 mL Inject 12.5 mg into skin every 7 days.  Diet, exercise, and 10% weight loss encouraged.   Close monitoring to help with pre-diabetes/insulin resistance.   Will titrate Rx Mounjaro as needed/tolerated until max dose achieved. Recheck CMP, HgA1C in 11/2024. Notify M.D. or ER if symptoms persist or worsen, adverse Rx side effects, temp greater  than 100.4, or any acute illness.        Orders:  -     Comprehensive Metabolic Panel; Future; Expected date: 10/01/2024  -     Hemoglobin A1C; Future; Expected date: 10/01/2024    5. Obesity (BMI 30-39.9)  Assessment & Plan:  Body mass index is 31.31 kg/m².  Goal BMI <30. Improving.  Exercise 5 times a week for 30 minutes per day.  Avoid soda, simple sugars, excessive rice, potatoes or bread. Limit fast foods and fried foods.  Choose complex carbs in moderation (example: green vegetables, beans, oatmeal). Eat plenty of fresh fruits and vegetables with lean meats daily.  Do not skip meals. Eat a balanced portion size.  Avoid fad diets. Consider permanent healthy life style changes.         6. Generalized anxiety disorder  Assessment & Plan:  Rx trial dose increase of busPIRone (BUSPAR) 15 MG tablet; Take 1 tablet (15 mg total) by mouth 2 (two) times daily. Will titrate medication as tolerated for symptom resolution.  Denies SI/HI, AH/VH.  Recommend relaxation techniques and coping strategies such as exercise, essential oils, deep breathing, spiritual practices, etc.  Seek immediate medical treatment for SOB, persistent panic attack, chest pain, suicidal thoughts or hallucinations.      Orders:  -     busPIRone (BUSPAR) 15 MG tablet; Take 1 tablet (15 mg total) by mouth 2 (two) times daily.  Dispense: 60 tablet; Refill: 2       The following assessments were completed and reviewed. See completed screening forms and assessments within the Encounter Summary.  [x] Health Risk Assessment   [] CVD Risk Factors - Reviewed  [] Obesity/Physical Activity -  Encouraged daily 30 minute physical activity x 5 days per week.   [] Home Safety/Living Situation  [] CAGE  [x] Depression (PHQ) Screen  [] Timed Get Up and Go  [] Whisper Test  [x] Cognitive Function/Impairment Screen  [] Nutrition Screening  [] ADL Screen  [x] Opioid Screen:  [] Patient does not have a prescription for opioids.   [x] Patient has a prescription for  opioids but is at low risk for abuse.   [x] Substance Abuse Screen:   [x] Patient does not use substances.   [x] Advanced Care Planning:             No data to display                  6/19/2024     1:25 PM 6/19/2024     1:10 PM 2/7/2024     1:36 PM 10/31/2023    10:08 AM 9/27/2023     3:24 PM 8/22/2023     1:28 PM 7/6/2023    11:31 AM   Depression Screeening PHQ2   Over the last two weeks how often have you been bothered by little interest or pleasure in doing things 0 0 0 0 0 0 0   Over the last two weeks how often have you been bothered by feeling down, depressed or hopeless 0 0 0 0 0 0 0   PHQ-2 Total Score 0 0 0 0 0 0 0         6/19/2024     1:00 PM 2/7/2024     1:30 PM 10/31/2023    10:00 AM 9/27/2023     3:00 PM 8/22/2023     1:30 PM 7/6/2023    11:00 AM 6/7/2023     2:15 PM   Fall Risk Assessment - Outpatient   Mobility Status Ambulatory Ambulatory Ambulatory Ambulatory Ambulatory Ambulatory Ambulatory   Number of falls 0 0 0 0 0 0 0   Identified as fall risk False False False False False False False       Cognitive and Functional Status  Vision or Hearing Difficulty: No  Difficulty Concentrating, Remembering, or Making Decisions: No  Difficulty Completing Activities of Daily Living: No     What [age] 65-69  Are you male or female?: Female  During the past four weeks, how much have you been bothered by emotional problems such as feeling anxious, depressed, irritable, sad, or downhearted and blue?: Not at all  During the past five weeks, has your physical and/or emotional health limited your social activities with family, friends, neighbors, or groups?: Not at all  During the past four weeks, how much bodily pain have you generally had?: Moderate pain  During the past four weeks, was someone available to help if you needed and wanted help?: Yes, some  During the past four weeks, what was the hardest physical activity you could do for at least two minutes?: Light  Can you get to places out of walking  distance without help?  (For example, can you travel alone on buses or taxis, or drive your own car?): Yes  Can you go shopping for groceries or clothes without someone's help?: Yes  Can you prepare your own meals?: Yes  Can you do your own housework without help?: Yes  Because of any health problems, do you need the help of another person with your personal care needs such as eating, bathing, dressing, or getting around the house?: No  Can you handle your own money without help?: Yes  During the past four weeks, how would you rate your health in general?: Fair  How have things been going for you during the past four weeks?: Very well  Are you having difficulties driving your car?: No  Do you always fasten your seat belt when you are in a car?: Yes, usually  How often in the past four weeks have you been bothered by falling or dizzy when standing up?: Never  How often in the past four weeks have you been bothered by sexual problems?: Never  How often in the past four weeks have you been bothered by trouble eating well?: Never  How often in the past four weeks have you been bothered by teeth or denture problems?: Never  How often in the past four weeks have you been bothered with problems using the telephone?: Never  How often in the past four weeks have you been bothered by tiredness or fatigue?: Sometimes  Have you fallen two or more times in the past year?: No  Are you afraid of falling?: No  Are you a smoker?: No  During the past four weeks, how many drinks of wine, beer, or other alcoholic beverages did you have?: One drink or less per week  Do you exercise for about 20 minutes three or more days a week?: Yes, most of the time  Have you been given any information to help you with hazards in your house that might hurt you?: No  Have you been given any information to help you with keeping track of your medications?: No  How often do you have trouble taking medicines the way you've been told to take them?: I always  take them as prescribed  How confident are you that you can control and manage most of your health problems?: Somewhat confident  What is your race? (Check all that apply.):                 No follow-ups on file. In addition to their scheduled follow up, the patient has also been instructed to follow up on as needed basis.     Future Appointments   Date Time Provider Department Center   9/11/2024  2:00 PM Marianela Ulrich MD [facility]    6/23/2025  1:00 PM Renée Camarena FNP [facility]         [facility] Lei

## 2024-06-20 PROBLEM — E66.9 OBESITY (BMI 30-39.9): Status: ACTIVE | Noted: 2024-06-20

## 2024-06-20 RX ORDER — BUSPIRONE HYDROCHLORIDE 15 MG/1
15 TABLET ORAL 2 TIMES DAILY
Qty: 60 TABLET | Refills: 2 | Status: SHIPPED | OUTPATIENT
Start: 2024-06-20 | End: 2024-09-18

## 2024-06-20 NOTE — ASSESSMENT & PLAN NOTE
Stable.  Continue atorvastatin (LIPITOR) 20 MG 1 tablet every evening.  Notify the provider if you experience abdominal pain, muscle aches or cramps.  Follow low cholesterol, low fat diet. Avoid fried foods and high saturated fats.  Increase fiber intake. Foods high in soluble fiber, such as oats, beans, lentils, fruits, and vegetables, can help lower LDL cholesterol levels.   Exercise/walk 5 times per week for 30 minutes a day. Regular physical activity can help raise HDL (high-density lipoprotein) cholesterol and lower LDL cholesterol.

## 2024-06-20 NOTE — ASSESSMENT & PLAN NOTE
Monthly self breast exam encouraged.  Continue annual pelvic exam, eye exam and biannual dental exam.

## 2024-06-20 NOTE — ASSESSMENT & PLAN NOTE
Rx trial dose increase of busPIRone (BUSPAR) 15 MG tablet; Take 1 tablet (15 mg total) by mouth 2 (two) times daily. Will titrate medication as tolerated for symptom resolution.  Denies SI/HI, AH/VH.  Recommend relaxation techniques and coping strategies such as exercise, essential oils, deep breathing, spiritual practices, etc.  Seek immediate medical treatment for SOB, persistent panic attack, chest pain, suicidal thoughts or hallucinations.

## 2024-06-20 NOTE — ASSESSMENT & PLAN NOTE
A1C improving  Continue tirzepatide 12.5 mg/0.5 mL Inject 12.5 mg into skin every 7 days.  Diet, exercise, and 10% weight loss encouraged.   Close monitoring to help with pre-diabetes/insulin resistance.   Will titrate Rx Mounjaro as needed/tolerated until max dose achieved. Recheck CMP, HgA1C in 11/2024. Notify M.D. or ER if symptoms persist or worsen, adverse Rx side effects, temp greater than 100.4, or any acute illness.

## 2024-06-20 NOTE — ASSESSMENT & PLAN NOTE
Stable.  Continue diltiaZEM (TIAZAC) 240 MG and losartan-hydrochlorothiazide 100-25 mg (HYZAAR) 100-25 mg per tablet daily.  Low Sodium Diet (DASH Diet - Less than 2 grams of sodium per day).  Monitor blood pressure daily and log. Report consistent numbers greater than 140/90.  Continue smoking cessation; encouraged to aid in BP reduction.  Goal BMI <30. Exercise 30 minutes per day, 5 days per week.   Notify M.D. or ER if BP >170/100 or <90/60, chest pain, palpitations, headache, SOB, temp greater than 100.4, or any acute illness.

## 2024-06-20 NOTE — ASSESSMENT & PLAN NOTE
Body mass index is 31.31 kg/m².  Goal BMI <30. Improving.  Exercise 5 times a week for 30 minutes per day.  Avoid soda, simple sugars, excessive rice, potatoes or bread. Limit fast foods and fried foods.  Choose complex carbs in moderation (example: green vegetables, beans, oatmeal). Eat plenty of fresh fruits and vegetables with lean meats daily.  Do not skip meals. Eat a balanced portion size.  Avoid fad diets. Consider permanent healthy life style changes.

## 2024-06-21 DIAGNOSIS — E78.49 ESSENTIAL FAMILIAL HYPERLIPIDEMIA: ICD-10-CM

## 2024-06-21 DIAGNOSIS — I10 HYPERTENSION, UNSPECIFIED TYPE: ICD-10-CM

## 2024-06-24 RX ORDER — LOSARTAN POTASSIUM AND HYDROCHLOROTHIAZIDE 25; 100 MG/1; MG/1
1 TABLET ORAL
Qty: 90 TABLET | Refills: 3 | Status: SHIPPED | OUTPATIENT
Start: 2024-06-24

## 2024-06-24 RX ORDER — ATORVASTATIN CALCIUM 20 MG/1
20 TABLET, FILM COATED ORAL
Qty: 90 TABLET | Refills: 3 | Status: SHIPPED | OUTPATIENT
Start: 2024-06-24

## 2024-06-26 ENCOUNTER — E-VISIT (OUTPATIENT)
Dept: FAMILY MEDICINE | Facility: CLINIC | Age: 69
End: 2024-06-26
Payer: MEDICARE

## 2024-06-26 ENCOUNTER — TELEPHONE (OUTPATIENT)
Dept: FAMILY MEDICINE | Facility: CLINIC | Age: 69
End: 2024-06-26
Payer: MEDICARE

## 2024-06-26 DIAGNOSIS — B00.1 COLD SORE: Primary | ICD-10-CM

## 2024-06-26 RX ORDER — VALACYCLOVIR HYDROCHLORIDE 1 G/1
2000 TABLET, FILM COATED ORAL EVERY 12 HOURS
Qty: 4 TABLET | Refills: 1 | Status: SHIPPED | OUTPATIENT
Start: 2024-06-26 | End: 2024-06-27

## 2024-06-26 NOTE — TELEPHONE ENCOUNTER
----- Message from Simone Bucio sent at 6/25/2024  4:34 PM CDT -----  .Type:  Needs Medical Advice    Who Called: Wen    Symptoms (please be specific):    How long has patient had these symptoms:    Pharmacy name and phone #:  [facility] in [place]   Would the patient rather a call back or a response via [facility]?   Best Call Back Number: [phone]  Additional Information: She needs something called in for a cold sore, please call her back.

## 2024-06-26 NOTE — PROGRESS NOTES
Patient ID: Wen Otoole is a 68 y.o. female.    Chief Complaint: Mouth Lesions    The patient initiated a request through [facility] on 6/26/2024 for evaluation and management with a chief complaint of Mouth Lesions     I evaluated the questionnaire submission on 06/26/2024.    [facility]    6/26/2024 10:40 AM CDT - Filed by Patient   Do you agree to participate in an E-Visit? Yes   If you have any of the following symptoms, please present to your local emergency room or call 911:  I acknowledge   Choose the state of your primary residence [place]   What is the main issue you would like addressed today? Cold sore   Please describe your symptoms Cold sore   Where is your problem located? Lip   How severe are your symptoms? Mild   Have you had these symptoms before? Yes   How long have you been having these symptoms? For a few days   Please list any medications or treatments you have used for your condition and indicate if it was effective or not. Toothpaste   What makes this feel better? I havent tried anything   What makes this feel worse? Its a little swollen   Are these symptoms related to a condition that you currently have? Yes   What is the condition? Cold sore   When were you last seen for this condition?    Please describe any probable cause for these symptoms A litte swollen   Provide any additional information you feel is important.    Please attach any relevant images or files    Are you able to take your vital signs? No         Encounter Diagnosis   Name Primary?    Cold sore Yes        No orders of the defined types were placed in this encounter.     Medications Ordered This Encounter   Medications    valACYclovir (VALTREX) 1000 MG tablet     Sig: Take 2 tablets (2,000 mg total) by mouth every 12 (twelve) hours. for 2 doses     Dispense:  4 tablet     Refill:  1      I sent an antiviral (Valtrex) to help her symptoms. Notify M.D. or ER if symptoms persist or worsen, temp >100.4, or any  acute illness.        Follow up if symptoms worsen or fail to improve.      E-Visit Time Tracking:    Day 1 Time (in minutes): 5    Total Time (in minutes): 5

## 2024-07-05 ENCOUNTER — LAB VISIT (OUTPATIENT)
Dept: LAB | Facility: HOSPITAL | Age: 69
End: 2024-07-05
Attending: FAMILY MEDICINE
Payer: MEDICARE

## 2024-07-05 DIAGNOSIS — Z00.00 MEDICARE ANNUAL WELLNESS VISIT, SUBSEQUENT: ICD-10-CM

## 2024-07-05 LAB
BACTERIA #/AREA URNS AUTO: ABNORMAL /HPF
BILIRUB UR QL STRIP.AUTO: NEGATIVE
CLARITY UR: ABNORMAL
COLOR UR AUTO: ABNORMAL
GLUCOSE UR QL STRIP: NEGATIVE
HGB UR QL STRIP: ABNORMAL
KETONES UR QL STRIP: NEGATIVE
LEUKOCYTE ESTERASE UR QL STRIP: NEGATIVE
NITRITE UR QL STRIP: NEGATIVE
PH UR STRIP: 6 [PH]
PROT UR QL STRIP: NEGATIVE
RBC #/AREA URNS AUTO: ABNORMAL /HPF
SP GR UR STRIP.AUTO: 1.01 (ref 1–1.03)
SQUAMOUS #/AREA URNS AUTO: ABNORMAL /HPF
UROBILINOGEN UR STRIP-ACNC: 0.2
WBC #/AREA URNS AUTO: ABNORMAL /HPF

## 2024-07-05 PROCEDURE — 81003 URINALYSIS AUTO W/O SCOPE: CPT

## 2024-07-05 PROCEDURE — 81001 URINALYSIS AUTO W/SCOPE: CPT

## 2024-07-15 ENCOUNTER — OFFICE VISIT (OUTPATIENT)
Dept: FAMILY MEDICINE | Facility: CLINIC | Age: 69
End: 2024-07-15
Payer: MEDICARE

## 2024-07-15 VITALS
SYSTOLIC BLOOD PRESSURE: 131 MMHG | BODY MASS INDEX: 30.19 KG/M2 | WEIGHT: 159.88 LBS | DIASTOLIC BLOOD PRESSURE: 79 MMHG | HEART RATE: 65 BPM | HEIGHT: 61 IN | OXYGEN SATURATION: 97 %

## 2024-07-15 DIAGNOSIS — I10 PRIMARY HYPERTENSION: ICD-10-CM

## 2024-07-15 DIAGNOSIS — R42 DIZZINESS: ICD-10-CM

## 2024-07-15 DIAGNOSIS — J30.1 ALLERGIC RHINITIS DUE TO POLLEN, UNSPECIFIED SEASONALITY: Primary | ICD-10-CM

## 2024-07-15 DIAGNOSIS — R31.9 HEMATURIA, UNSPECIFIED TYPE: ICD-10-CM

## 2024-07-15 PROBLEM — R39.9 UTI SYMPTOMS: Status: ACTIVE | Noted: 2024-07-15

## 2024-07-15 LAB
BACTERIA #/AREA URNS AUTO: NORMAL /HPF
BILIRUB UR QL STRIP.AUTO: NEGATIVE
CLARITY UR: CLEAR
COLOR UR AUTO: NORMAL
GLUCOSE UR QL STRIP: NORMAL
HGB UR QL STRIP: NEGATIVE
KETONES UR QL STRIP: NEGATIVE
LEUKOCYTE ESTERASE UR QL STRIP: NEGATIVE
NITRITE UR QL STRIP: NEGATIVE
PH UR STRIP: 6.5 [PH]
PROT UR QL STRIP: NEGATIVE
RBC #/AREA URNS AUTO: NORMAL /HPF
SP GR UR STRIP.AUTO: 1.02 (ref 1–1.03)
SQUAMOUS #/AREA URNS LPF: NORMAL /HPF
UROBILINOGEN UR STRIP-ACNC: NORMAL
WBC #/AREA URNS AUTO: NORMAL /HPF

## 2024-07-15 PROCEDURE — 1126F AMNT PAIN NOTED NONE PRSNT: CPT | Mod: CPTII,,,

## 2024-07-15 PROCEDURE — 3008F BODY MASS INDEX DOCD: CPT | Mod: CPTII,,,

## 2024-07-15 PROCEDURE — 3288F FALL RISK ASSESSMENT DOCD: CPT | Mod: CPTII,,,

## 2024-07-15 PROCEDURE — 1101F PT FALLS ASSESS-DOCD LE1/YR: CPT | Mod: CPTII,,,

## 2024-07-15 PROCEDURE — 81001 URINALYSIS AUTO W/SCOPE: CPT

## 2024-07-15 PROCEDURE — 99214 OFFICE O/P EST MOD 30 MIN: CPT | Mod: ,,,

## 2024-07-15 PROCEDURE — 3044F HG A1C LEVEL LT 7.0%: CPT | Mod: CPTII,,,

## 2024-07-15 PROCEDURE — 3075F SYST BP GE 130 - 139MM HG: CPT | Mod: CPTII,,,

## 2024-07-15 PROCEDURE — 1160F RVW MEDS BY RX/DR IN RCRD: CPT | Mod: CPTII,,,

## 2024-07-15 PROCEDURE — 1159F MED LIST DOCD IN RCRD: CPT | Mod: CPTII,,,

## 2024-07-15 PROCEDURE — G2211 COMPLEX E/M VISIT ADD ON: HCPCS | Mod: ,,,

## 2024-07-15 PROCEDURE — 3078F DIAST BP <80 MM HG: CPT | Mod: CPTII,,,

## 2024-07-15 RX ORDER — MECLIZINE HYDROCHLORIDE 25 MG/1
25 TABLET ORAL 3 TIMES DAILY PRN
Qty: 30 TABLET | Refills: 0 | Status: SHIPPED | OUTPATIENT
Start: 2024-07-15

## 2024-07-15 NOTE — PROGRESS NOTES
Patient ID: [id number]     Chief Complaint: Headache,dizzy, runny nose    HPI:     Wen Otoole is a 68 y.o. female here today for a follow up.   Lightheaded with dizziness, mild headache. Associated symptoms cough, runny nose, pnd. Onset 5 days ago. Denies syncopy, chest pain, shortness of breath, n/v. Denies exposure to ill individuals or crowds.   Discussed the need to repeat urinalysis for hematuria. Previous sample was inadequate. Patient agreed.  BP stable with current Rx. Denies medication side effects. Monitors BP at home, readings range 120-130/70's. Denies the need for refill at this time.     Past Medical History:   Diagnosis Date    Binge eating disorder     Generalized anxiety disorder     Hypertensive disorder     Insomnia     Obesity, unspecified     BENIGNO (obstructive sleep apnea)     Personal history of colonic polyps         Past Surgical History:   Procedure Laterality Date    BACK SURGERY      CHOLECYSTECTOMY      COLONOSCOPY  09/23/2020    KNEE ARTHROSCOPY      LUMBAR FUSION      NECK SURGERY          Social History     Tobacco Use    Smoking status: Former     Types: Cigarettes     Passive exposure: Past    Smokeless tobacco: Never   Substance and Sexual Activity    Alcohol use: Never    Drug use: Never    Sexual activity: Not Currently     Partners: Female     Birth control/protection: None        Current Outpatient Medications   Medication Instructions    amoxicillin-clavulanate 875-125mg (AUGMENTIN) 875-125 mg per tablet 1 tablet, Oral, 2 times daily    atorvastatin (LIPITOR) 20 mg, Oral    busPIRone (BUSPAR) 15 mg, Oral, 2 times daily    cholecalciferol (vitamin D3) 50,000 Units, Oral, Every 7 days    diltiaZEM (TIAZAC) 240 mg, Oral, Daily    levocetirizine (XYZAL) 5 mg, Oral, Daily    losartan-hydrochlorothiazide 100-25 mg (HYZAAR) 100-25 mg per tablet 1 tablet, Oral    meclizine (ANTIVERT) 25 mg, Oral, 3 times daily PRN    oxymetazoline (AFRIN, OXYMETAZOLINE,) 0.05 % nasal spray 2  "sprays, Nasal, 2 times daily, Do not take over 3 days due to rebound congestion    phenazopyridine (PYRIDIUM) 200 mg, Oral, 3 times daily PRN    suvorexant (BELSOMRA) 20 mg, Oral, Nightly PRN    tirzepatide 10 mg, Subcutaneous, Every 7 days    tirzepatide 12.5 mg, Subcutaneous, Every 7 days    valACYclovir (VALTREX) 2,000 mg, Oral, Every 12 hours       Review of patient's allergies indicates:  No Known Allergies     Patient Care Team:  Marianela Ulrich MD as PCP - General (Family Medicine)  Cesar Tariq MD as Consulting Physician (Gastroenterology)  [facility] -     Subjective:     Review of Systems    Constitutional:  Negative for activity change, appetite change,  Negative for fatigue chills or fever.   HENT:  Positive for postnasal drip and rhinorrhea. Negative for facial swelling and sore throat.    Eyes:  Negative for pain, redness and visual disturbance.   Respiratory:  Positive for cough, clear sputum. Negative for choking, chest tightness and shortness of breath.    Cardiovascular:  Negative for chest pain, palpitations and leg swelling.   Gastrointestinal:  Negative for abdominal pain, constipation, diarrhea, nausea and vomiting.   Endocrine: Negative.    Genitourinary:  Negative for dysuria, flank pain and frequency.   Musculoskeletal:  Negative for back pain, joint swelling, myalgias and neck pain.   Skin: Negative.    Allergic/Immunologic: Negative.    Neurological:  Positive for dizziness, light-headedness and headaches. Negative for syncope and weakness.   Psychiatric/Behavioral:  Negative for behavioral problems, confusion, self-injury, sleep disturbance and suicidal ideas.      12 point review of systems conducted, negative except as stated in the history of present illness. See HPI for details.    Objective:     Visit Vitals  /79 (BP Location: Left arm, Patient Position: Sitting, BP Method: Medium (Automatic))   Pulse 65   Ht 5' 1" (1.549 m)   Wt 72.5 " kg (159 lb 14.4 oz)   SpO2 97%   BMI 30.21 kg/m²     Physical Exam    Vitals reviewed.   Constitutional:       General: She is not in acute distress.     Appearance: She is not ill-appearing.   HENT:      Right Ear: Tympanic membrane and ear canal normal.      Left Ear: Tympanic membrane and ear canal normal.      Nose: Rhinorrhea present.      Mouth: Mucous membranes are moist.      Throat: Pharyngeal mucous present, No erythema, no edema, no exudate.       Mild maxillary tenderness.    Eyes:      Conjunctiva/sclera: Conjunctivae normal.      Pupils: Pupils are equal, round, and reactive to light.   Cardiovascular:      Rate and Rhythm: Normal rate and regular rhythm.      Pulses: Normal pulses.      Heart sounds: Normal heart sounds.   Pulmonary:      Effort: Pulmonary effort is normal.      Breath sounds: Normal breath sounds.   Abdominal:      General: Bowel sounds are normal.   Musculoskeletal:         General: No swelling.      Cervical back: Normal range of motion and neck supple.      Right lower leg: No edema.      Left lower leg: No edema.   Skin:     General: Skin is warm and dry.      Capillary Refill: Capillary refill takes less than 2 seconds.   Neurological:      General: No focal deficit present.      Mental Status: She is alert and oriented to person, place, and time.   Psychiatric:         Mood and Affect: Mood normal.         Behavior: Behavior normal.     Assessment:       ICD-10-CM ICD-9-CM   1. Allergic rhinitis due to pollen, unspecified seasonality  J30.1 477.0   2. Dizziness  R42 780.4   3. Primary hypertension  I10 401.9   4. Hematuria, unspecified type  R31.9 599.70      Plan:     1. Allergic rhinitis due to pollen, unspecified seasonality  Assessment & Plan:  Levocitirizine 5 mg, 1 tablet nightly.(Pt has medication at home)  Drink plenty of water to thin secretions.   Nasal washings daily as needed.  Use humidifier to moisten air.  Wash hands thoroughly. Wear a mask as  needed.  Avoid/limit triggers such as pollen, dust, molds, and pet dander.   Go to the emergency department if symptoms of chest pain/tightness, shortness of breath, fever/chills, temp >100.4 or any acute illness.       Orders:  -     meclizine (ANTIVERT) 25 mg tablet; Take 1 tablet (25 mg total) by mouth 3 (three) times daily as needed for Dizziness (PRN dizziness).  Dispense: 30 tablet; Refill: 0    2. Dizziness  Assessment & Plan:  Rx trial of meclizine (ANTIVERT) 25 mg tablet; Take 1 tablet (25 mg total) by mouth 3 (three) times daily as needed for Dizziness (PRN dizziness).   Notify provider of adverse reaction.  Avoid changing your position too quickly.   Sit on the edge of the bed for a few minutes before you stand up. Start to walk slowly after you stand up.  Be sure to drink enough fluids, even if you do not feel thirsty.  Sit or lie down right away if you feel dizzy.   Avoid driving when dizzy. If you feel dizzy while driving, pull over right away.        3. Primary hypertension  Assessment & Plan:  BP Stable. Continue hyzaar as prescribed.  Will titrate BP Rx until BP <138/89. Monitor blood pressure daily and log. Report consistent numbers greater than 138/89.   Continue to follow a low sodium diet (DASH Diet - Less than 2 grams of sodium per day).  Smoking cessation encouraged to aid in BP reduction.  Goal BMI <30. Exercise 30 minutes per day, 5 days per week.   Notify M.D. or ER if BP >170/100 or <90/60, chest pain, palpitations, headache, SOB, temp greater than 100.4, or any acute illness.       4. Hematuria, unspecified type  Assessment & Plan:  Repeat urinalysis. Treat pending results.    Orders:  -     Urinalysis         In addition to their scheduled follow up, the patient has also been instructed to follow up on as needed basis.     Future Appointments   Date Time Provider Department [place]   9/11/2024  2:00 PM Marianela Ulrich MD [facility] JORGE Fonseca    6/23/2025  1:00 PM [facility]  MARVIN Chinchilla [facility] MARVIN Rivas

## 2024-07-17 ENCOUNTER — PATIENT MESSAGE (OUTPATIENT)
Dept: FAMILY MEDICINE | Facility: CLINIC | Age: 69
End: 2024-07-17
Payer: MEDICARE

## 2024-07-23 NOTE — ASSESSMENT & PLAN NOTE
BP Stable. Continue hyzaar as prescribed.  Will titrate BP Rx until BP <138/89. Monitor blood pressure daily and log. Report consistent numbers greater than 138/89.   Continue to follow a low sodium diet (DASH Diet - Less than 2 grams of sodium per day).  Smoking cessation encouraged to aid in BP reduction.  Goal BMI <30. Exercise 30 minutes per day, 5 days per week.   Notify M.D. or ER if BP >170/100 or <90/60, chest pain, palpitations, headache, SOB, temp greater than 100.4, or any acute illness.

## 2024-07-25 ENCOUNTER — TELEPHONE (OUTPATIENT)
Dept: FAMILY MEDICINE | Facility: CLINIC | Age: 69
End: 2024-07-25
Payer: MEDICARE

## 2024-07-25 ENCOUNTER — PATIENT MESSAGE (OUTPATIENT)
Dept: FAMILY MEDICINE | Facility: CLINIC | Age: 69
End: 2024-07-25
Payer: MEDICARE

## 2024-07-25 ENCOUNTER — E-VISIT (OUTPATIENT)
Dept: FAMILY MEDICINE | Facility: CLINIC | Age: 69
End: 2024-07-25
Payer: MEDICARE

## 2024-07-25 DIAGNOSIS — R51.9 SINUS HEADACHE: ICD-10-CM

## 2024-07-25 DIAGNOSIS — J01.00 SUBACUTE MAXILLARY SINUSITIS: Primary | ICD-10-CM

## 2024-07-25 RX ORDER — AMOXICILLIN AND CLAVULANATE POTASSIUM 875; 125 MG/1; MG/1
1 TABLET, FILM COATED ORAL 2 TIMES DAILY
Qty: 14 TABLET | Refills: 0 | Status: SHIPPED | OUTPATIENT
Start: 2024-07-25 | End: 2024-08-01

## 2024-07-25 RX ORDER — OXYMETAZOLINE HCL 0.05 %
2 SPRAY, NON-AEROSOL (ML) NASAL 2 TIMES DAILY
Qty: 6 ML | Refills: 0 | Status: SHIPPED | OUTPATIENT
Start: 2024-07-25 | End: 2024-07-28

## 2024-07-25 NOTE — TELEPHONE ENCOUNTER
----- Message from Simone Bucio sent at 7/25/2024 12:42 PM CDT -----  .Type:  Needs Medical Advice    Who Called: Adela   Symptoms (please be specific):    How long has patient had these symptoms:    Pharmacy name and phone #:  [facility] in [place]   Would the patient rather a call back or a response via [facility]?   Best Call Back Number: [phone]  Additional Information: Patient requested to speak with the nurse re: getting an antibodies for sinsus medication.

## 2024-07-25 NOTE — PROGRESS NOTES
Patient ID: Wen Otoole is a 68 y.o. female.    Chief Complaint: URI (Entered automatically based on patient selection in [employer].)          274}  The patient initiated a request through [employer] on 7/25/2024 for evaluation and management with a chief complaint of URI (Entered automatically based on patient selection in [employer].)     I evaluated the questionnaire submission on 07/25/2024 .    Total Time (in minutes): 12     [facility] P[facility] E-Visit Covid    7/25/2024  1:55 PM CDT - Filed by Patient   Do you agree to participate in an E-Visit? Yes   If you have any of the following symptoms, go to your local emergency room or call 911: I acknowledge   What is the main issue you would like addressed today? Sinus infection   Do you think you might have COVID or the Flu? No   Have you tested positive for COVID or Flu? No   What symptoms do you currently have?  Headache;  Runny nose;  Pain around the nose and face   Have you ever smoked? I smoked in the past   Have you had a fever? No   When did your symptoms first appear? 7/15/2024   In the last two weeks, have you been in close contact with someone who has COVID-19 or the Flu? No   List what you have done or taken to help your symptoms. MECLIZINE   How severe are your symptoms? Moderate   Have your symptoms gotten better or worse since they started?  Worse   Do you have transportation to get testing if it is needed and ordered for you at an [facility] location? Yes   Provide any additional information you feel is important.    Please attach any relevant images or files    Are you able to take your vital signs? No          Active Problem List with Overview Notes    Diagnosis Date Noted    UTI symptoms 07/15/2024    Dizziness 07/15/2024    Allergic rhinitis due to pollen 07/15/2024    Obesity (BMI 30-39.9) 06/20/2024    Medicare annual wellness visit, subsequent 06/19/2024    Generalized anxiety disorder     Hyperparathyroidism 03/22/2023    Prediabetes 03/14/2023    Fatty liver  2023    Hepatitis B antibody positive 2023    Hypertension 2023    Essential familial hyperlipidemia 2023    Elevated ALT measurement 2023      Recent Labs Obtained:  Lab Results   Component Value Date    WBC 6.10 2024    HGB 15.0 2024    HCT 45.1 2024    MCV 89.5 2024     2024     2024    K 3.7 2024    CREATININE 1.07 (H) 2024    EGFRNORACEVR 56 2024    HGBA1C 5.2 2024    TSH 2.114 2024      Review of patient's allergies indicates:  No Known Allergies    Encounter Diagnoses   Name Primary?    Subacute maxillary sinusitis Yes    Sinus headache         No orders of the defined types were placed in this encounter.     Medications Ordered This Encounter   Medications    amoxicillin-clavulanate 875-125mg (AUGMENTIN) 875-125 mg per tablet     Sig: Take 1 tablet by mouth 2 (two) times daily. for 7 days     Dispense:  14 tablet     Refill:  0    oxymetazoline (AFRIN, OXYMETAZOLINE,) 0.05 % nasal spray     Si sprays by Nasal route 2 (two) times daily. Do not take over 3 days due to rebound congestion for 3 days     Dispense:  6 mL     Refill:  0        E-Visit Time Tracking:    Day 1 Time (in minutes): 12    Total Time (in minutes): 12      274}

## 2024-07-28 NOTE — ASSESSMENT & PLAN NOTE
Rx trial of meclizine (ANTIVERT) 25 mg tablet; Take 1 tablet (25 mg total) by mouth 3 (three) times daily as needed for Dizziness (PRN dizziness).   Notify provider of adverse reaction.  Avoid changing your position too quickly.   Sit on the edge of the bed for a few minutes before you stand up. Start to walk slowly after you stand up.  Be sure to drink enough fluids, even if you do not feel thirsty.  Sit or lie down right away if you feel dizzy.   Avoid driving when dizzy. If you feel dizzy while driving, pull over right away.

## 2024-07-28 NOTE — ASSESSMENT & PLAN NOTE
Levocitirizine 5 mg, 1 tablet nightly.(Pt has medication at home)  Drink plenty of water to thin secretions.   Nasal washings daily as needed.  Use humidifier to moisten air.  Wash hands thoroughly. Wear a mask as needed.  Avoid/limit triggers such as pollen, dust, molds, and pet dander.   Go to the emergency department if symptoms of chest pain/tightness, shortness of breath, fever/chills, temp >100.4 or any acute illness.

## 2024-08-09 ENCOUNTER — TELEPHONE (OUTPATIENT)
Dept: FAMILY MEDICINE | Facility: CLINIC | Age: 69
End: 2024-08-09
Payer: MEDICARE

## 2024-08-09 DIAGNOSIS — E66.9 OBESITY (BMI 30-39.9): ICD-10-CM

## 2024-08-09 DIAGNOSIS — R73.03 PREDIABETES: Primary | ICD-10-CM

## 2024-08-09 DIAGNOSIS — I10 PRIMARY HYPERTENSION: ICD-10-CM

## 2024-08-09 DIAGNOSIS — E66.01 CLASS 2 SEVERE OBESITY DUE TO EXCESS CALORIES WITH SERIOUS COMORBIDITY AND BODY MASS INDEX (BMI) OF 37.0 TO 37.9 IN ADULT: ICD-10-CM

## 2024-08-09 RX ORDER — TIRZEPATIDE 15 MG/.5ML
15 INJECTION, SOLUTION SUBCUTANEOUS
COMMUNITY
Start: 2024-04-15 | End: 2024-08-09 | Stop reason: SDUPTHER

## 2024-08-09 RX ORDER — TIRZEPATIDE 15 MG/.5ML
15 INJECTION, SOLUTION SUBCUTANEOUS
Qty: 4 PEN | Refills: 0 | Status: SHIPPED | OUTPATIENT
Start: 2024-08-09

## 2024-08-22 DIAGNOSIS — H10.10 ALLERGIC CONJUNCTIVITIS, UNSPECIFIED LATERALITY: ICD-10-CM

## 2024-08-22 DIAGNOSIS — E55.9 VITAMIN D DEFICIENCY: ICD-10-CM

## 2024-08-22 RX ORDER — ASPIRIN 325 MG
50000 TABLET, DELAYED RELEASE (ENTERIC COATED) ORAL
Qty: 12 CAPSULE | Refills: 1 | Status: SHIPPED | OUTPATIENT
Start: 2024-08-22

## 2024-08-22 RX ORDER — LEVOCETIRIZINE DIHYDROCHLORIDE 5 MG/1
5 TABLET, FILM COATED ORAL
Qty: 90 TABLET | Refills: 3 | Status: SHIPPED | OUTPATIENT
Start: 2024-08-22

## 2024-09-04 ENCOUNTER — TELEPHONE (OUTPATIENT)
Dept: FAMILY MEDICINE | Facility: CLINIC | Age: 69
End: 2024-09-04
Payer: MEDICARE

## 2024-09-04 NOTE — TELEPHONE ENCOUNTER
Are there any outstanding tasks in the patients's chart (ex.labs,MM,etc)?  no  Do we have outstanding/pending referrals?  no  Has the patient been seen in and ER,UCC, or been admitted since last visit?  no  Has the patient seen any other health care provider(doctors) since last visit?  yes  Has the patient had any bloodwork or x-rays done since last visit?  no

## 2024-09-16 DIAGNOSIS — B00.1 COLD SORE: ICD-10-CM

## 2024-09-16 RX ORDER — VALACYCLOVIR HYDROCHLORIDE 1 G/1
TABLET, FILM COATED ORAL
Qty: 4 TABLET | Refills: 1 | Status: SHIPPED | OUTPATIENT
Start: 2024-09-16

## 2024-09-17 ENCOUNTER — TELEPHONE (OUTPATIENT)
Dept: FAMILY MEDICINE | Facility: CLINIC | Age: 69
End: 2024-09-17
Payer: MEDICARE

## 2024-09-17 DIAGNOSIS — H10.10 ALLERGIC CONJUNCTIVITIS, UNSPECIFIED LATERALITY: ICD-10-CM

## 2024-09-17 RX ORDER — LEVOCETIRIZINE DIHYDROCHLORIDE 5 MG/1
5 TABLET, FILM COATED ORAL NIGHTLY
Qty: 90 TABLET | Refills: 3 | Status: SHIPPED | OUTPATIENT
Start: 2024-09-17

## 2024-09-17 NOTE — TELEPHONE ENCOUNTER
----- Message from Clifton Issa sent at 9/17/2024  2:34 PM CDT -----  .Who Called: Wen Otoole    Refill or New Rx:Refill    RX Name and Strength: levocetirizine (XYZAL) 5 MG tablet    How is the patient currently taking it? (ex. 1XDay): Sig - Route: TAKE ONE TABLET BY MOUTH EVERY DAY - Oral    Is this a 30 day or 90 day RX: 90    Local or Mail Order: Local    List of preferred pharmacies on file (remove unneeded): [id number] at [place] [address] AT    Phone: [phone]  Fax: [phone]    Ordering Provider: Dr. Ulrich    Preferred Method of Contact: Phone Call    Patient's Preferred Phone Number on File: [phone]     Best Call Back Number, if different:    Additional Information: n/a

## 2024-09-23 ENCOUNTER — TELEPHONE (OUTPATIENT)
Dept: FAMILY MEDICINE | Facility: CLINIC | Age: 69
End: 2024-09-23
Payer: MEDICARE

## 2024-09-23 PROBLEM — Z00.00 MEDICARE ANNUAL WELLNESS VISIT, SUBSEQUENT: Status: RESOLVED | Noted: 2024-06-19 | Resolved: 2024-09-23

## 2024-09-23 NOTE — TELEPHONE ENCOUNTER
----- Message from Gavino Doyle sent at 9/23/2024  3:27 PM CDT -----  Regarding: advice  Who Called:    Caller is requesting assistance/information from provider's office.    Symptoms (please be specific):    How long has patient had these symptoms:    List of preferred pharmacies on file (remove unneeded): [unfilled]  If different, enter pharmacy into here including location and phone number:       Preferred Method of Contact: Phone Call  Patient's Preferred Phone Number on File: [phone]   Best Call Back Number, if different:    Additional Information: nurse with Dr Bird orth[facility] called to clarify if pt had an appointment scheduled for pre-op, pt does have appt on 9/26. Nurse stated pt has a pre-op appt with dr bird on 9/25 and was questioning if labs needed to be sent to office. Please advise.     fax: [phone]  call back: [phone]

## 2024-09-26 ENCOUNTER — OFFICE VISIT (OUTPATIENT)
Dept: FAMILY MEDICINE | Facility: CLINIC | Age: 69
End: 2024-09-26
Payer: MEDICARE

## 2024-09-26 VITALS
HEIGHT: 61 IN | OXYGEN SATURATION: 96 % | HEART RATE: 73 BPM | BODY MASS INDEX: 31.34 KG/M2 | RESPIRATION RATE: 16 BRPM | SYSTOLIC BLOOD PRESSURE: 128 MMHG | DIASTOLIC BLOOD PRESSURE: 77 MMHG | WEIGHT: 166 LBS

## 2024-09-26 DIAGNOSIS — M25.59 PAIN IN OTHER SPECIFIED JOINT: ICD-10-CM

## 2024-09-26 DIAGNOSIS — R79.9 ABNORMAL FINDING OF BLOOD CHEMISTRY, UNSPECIFIED: ICD-10-CM

## 2024-09-26 DIAGNOSIS — E66.01 CLASS 2 SEVERE OBESITY DUE TO EXCESS CALORIES WITH SERIOUS COMORBIDITY AND BODY MASS INDEX (BMI) OF 37.0 TO 37.9 IN ADULT: ICD-10-CM

## 2024-09-26 DIAGNOSIS — Z01.818 PRE-OPERATIVE CLEARANCE: ICD-10-CM

## 2024-09-26 DIAGNOSIS — R73.03 PREDIABETES: ICD-10-CM

## 2024-09-26 DIAGNOSIS — M54.16 LUMBAR RADICULOPATHY: ICD-10-CM

## 2024-09-26 DIAGNOSIS — I10 HYPERTENSION, UNSPECIFIED TYPE: Primary | ICD-10-CM

## 2024-09-26 DIAGNOSIS — M19.19 POST-TRAUMATIC OSTEOARTHRITIS, OTHER SPECIFIED SITE: ICD-10-CM

## 2024-09-26 RX ORDER — LOSARTAN POTASSIUM AND HYDROCHLOROTHIAZIDE 25; 100 MG/1; MG/1
0.5 TABLET ORAL DAILY
Qty: 90 TABLET | Refills: 3 | Status: SHIPPED | OUTPATIENT
Start: 2024-09-26

## 2024-09-26 RX ORDER — TIRZEPATIDE 15 MG/.5ML
15 INJECTION, SOLUTION SUBCUTANEOUS
Qty: 4 PEN | Refills: 11 | Status: SHIPPED | OUTPATIENT
Start: 2024-09-26 | End: 2024-09-26

## 2024-09-26 RX ORDER — TIRZEPATIDE 15 MG/.5ML
15 INJECTION, SOLUTION SUBCUTANEOUS
Qty: 4 PEN | Refills: 11 | Status: SHIPPED | OUTPATIENT
Start: 2024-09-26

## 2024-09-26 NOTE — PROGRESS NOTES
Patient ID: [id number]     Chief Complaint: Hypertension and Prediabetes        HPI:     Wen Otoole is a 68 y.o. female here today for a follow up HTN, pre-diabetes, and pre-op clearance.   - HTN/HLD is stable and asymptomatic with current Rx and followed by Cardiology (Dr. Vidal), she reports that her Cardiologist recently  her Hyzaar to 1/2 table daily. She is UTD on labs. She would like to be enrolled in digital medicine program.    - She has pre-diabetes and she is obese, gained 6 pounds since last visit due to she can only afford her Mounjaro injection every 6 weeks, she has lost 41 pounds since starting Mounjaro, but would like lose more weight, she is doing well with Mounjaro, no side effects, she wants to stay at her current dose, she has Rx at home. She denies family history of medullary thyroid cancer, or MEN II, or personal history of pancreatitis, she needs Rx Mounjaro refilled today. She is not interested in surgical weight loss or seeing a dietician.   -Surgery: Decompression/PSF with Inst L4-L5  Surgeon: Dr. Bird  Date: 10/15/2024  Anesthesia type: general  Allergies to medications: Review of patient's allergies indicates:   -- None     Previous exposure to anesthesia: yes  Complications secondary to anesthesia:  no        Preoperative Evaluation:  Step 1: Emergency Surgery? No  Step 2: Has coronary revascularization been done in the past 5 years? No  Step 3: Has coronary angiography or stress test been done in past 2 years? Not yet, she is scheduled with her Cardiologist for stress in 10/2024  Step 4: Evaluate clinical predictors:  No symptoms of stable or unstable angina, no history of arrhythmias, no history of severe valvular heart disease, no history of uncontrolled hypertension, no history of abnormal EKG, no history of prior myocardial infarction, no history to suggest congestive heart failure, no history of prior CHF, no history of diabetes or renal insufficiency. She is a  non-smoker. She is not taking any NSAIDs or blood thinners.   METS Score > 4: also able to climb one flight of stairs without having to rest     EKG ordered today: yes  Preoperative Labs ordered: yes  Medications adjusted: n/a      - Patient is without any other complaints today.         -------------------------------------    Binge eating disorder    Generalized anxiety disorder    Hypertensive disorder    Insomnia    Obesity, unspecified    BENIGNO (obstructive sleep apnea)    Personal history of colonic polyps        Past Surgical History:   Procedure Laterality Date    BACK SURGERY      CHOLECYSTECTOMY      COLONOSCOPY  09/23/2020    KNEE ARTHROSCOPY      LUMBAR FUSION      NECK SURGERY         Review of patient's allergies indicates:  No Known Allergies    Outpatient Medications Marked as Taking for the 9/26/24 encounter (Office Visit) with Marianela Ulrich MD   Medication Sig Dispense Refill    atorvastatin (LIPITOR) 20 MG tablet TAKE ONE TABLET BY MOUTH EVERY DAY 90 tablet 3    cholecalciferol, vitamin D3, 1,250 mcg (50,000 unit) capsule TAKE ONE CAPSULE BY MOUTH ONCE WEEKLY 12 capsule 1    diltiaZEM (TIAZAC) 240 MG Cs24 Take 1 capsule (240 mg total) by mouth once daily. 90 capsule 3    levocetirizine (XYZAL) 5 MG tablet Take 1 tablet (5 mg total) by mouth every evening. 90 tablet 3    meclizine (ANTIVERT) 25 mg tablet Take 1 tablet (25 mg total) by mouth 3 (three) times daily as needed for Dizziness (PRN dizziness). 30 tablet 0    phenazopyridine (PYRIDIUM) 200 MG tablet Take 1 tablet (200 mg total) by mouth 3 (three) times daily as needed for Pain. 6 tablet 0    suvorexant (BELSOMRA) 20 mg Tab Take 20 mg by mouth nightly as needed (insomnia). 30 tablet 5    valACYclovir (VALTREX) 1000 MG tablet TAKE 2 TABLETS(2000 MG) BY MOUTH EVERY 12 HOURS FOR 2 DOSES 4 tablet 1    [DISCONTINUED] losartan-hydrochlorothiazide 100-25 mg (HYZAAR) 100-25 mg per tablet TAKE ONE TABLET BY MOUTH EVERY DAY 90 tablet 3     [DISCONTINUED] MOUNJARO 15 mg/0.5 mL PnIj Inject 15 mg into the skin every 7 days. 4 Pen 0    [DISCONTINUED] tirzepatide 12.5 mg/0.5 mL PnIj Inject 12.5 mg into the skin every 7 days. 4 Pen 2       Social History     Socioeconomic History    Marital status: Single   Tobacco Use    Smoking status: Former     Types: Cigarettes     Passive exposure: Past    Smokeless tobacco: Never   Substance and Sexual Activity    Alcohol use: Never    Drug use: Never    Sexual activity: Not Currently     Partners: Female     Birth control/protection: None     Social Determinants of Health     Financial Resource Strain: Low Risk  (9/23/2024)    Overall Financial Resource Strain (CARDIA)     Difficulty of Paying Living Expenses: Not very hard   Food Insecurity: No Food Insecurity (9/23/2024)    Hunger Vital Sign     Worried About Running Out of Food in the Last Year: Never true     Ran Out of Food in the Last Year: Never true   Transportation Needs: No Transportation Needs (6/19/2024)    TRANSPORTATION NEEDS     Transportation : No   Physical Activity: Insufficiently Active (9/23/2024)    Exercise Vital Sign     Days of Exercise per Week: 2 days     Minutes of Exercise per Session: 60 min   Stress: No Stress Concern Present (9/23/2024)    [employer] - Occupational Stress Questionnaire     Feeling of Stress : Only a little   Housing Stability: Low Risk  (9/23/2024)    Housing Stability Vital Sign     Unable to Pay for Housing in the Last Year: No     Homeless in the Last Year: No        Family History   Problem Relation Name Age of Onset    Hypertension Mother Merida     Diabetes Mother Merida     Coronary artery disease Mother Magnolia     Coronary artery disease Brother          Subjective:       Review of Systems:    See HPI for details    Constitutional: Denies Change in appetite. Denies Chills. Denies Fever. Denies Night sweats.  Eye: Denies Blurred vision. Denies Discharge. Denies Eye pain.  ENT: Denies  "Decreased hearing. Denies Sore throat. Denies Swollen glands.  Respiratory: Denies Cough. Denies Shortness of breath. Denies Shortness of breath with exertion. Denies Wheezing.  Cardiovascular: Denies Chest pain at rest. Denies Chest pain with exertion. Denies Irregular heartbeat. Denies Palpitations.  Gastrointestinal: Denies Abdominal pain. Denies Diarrhea. Denies Nausea. Denies Vomiting. Denies Hematemesis or Hematochezia.  Genitourinary: Denies Dysuria. Denies Urinary frequency. Denies Urinary urgency. Denies Blood in urine.  Endocrine: Denies Cold intolerance. Denies Excessive thirst. Denies Heat intolerance. Denies Weight loss. Denies Weight gain.  Musculoskeletal: Reports Painful joints. Denies Weakness.  Integumentary: Denies Rash. Denies Itching. Denies Dry skin.  Neurologic: Denies Dizziness. Denies Fainting. Denies Headache.  Psychiatric: Denies Depression. Denies Anxiety. Denies Suicidal/Homicidal ideations.    All Other ROS: Negative except as stated in HPI.       Objective:     /77 (BP Location: Right arm, Patient Position: Sitting, BP Method: Large (Automatic))   Pulse 73   Resp 16   Ht 5' 1" (1.549 m)   Wt 75.3 kg (166 lb)   SpO2 96%   BMI 31.37 kg/m²     Physical Exam    General: Alert and oriented, No acute distress.  Head: Normocephalic, Atraumatic.  Eye: Pupils are equal, round and reactive to light, Extraocular movements are intact, Sclera non-icteric.  Ears/Nose/Throat: Normal, Mucosa moist,Clear.  Neck/Thyroid: Supple, Non-tender, No carotid bruit, No palpable thyromegaly or thyroid nodule, No lymphadenopathy, No JVD, Full range of motion.  Respiratory: Clear to auscultation bilaterally; No wheezes, rales or rhonchi,Non-labored respirations, Symmetrical chest wall expansion.  Cardiovascular: Regular rate and rhythm, S1/S2 normal, No murmurs, rubs or gallops.  Gastrointestinal: Soft, Non-tender, Non-distended, Normal bowel sounds, No palpable organomegaly.  Musculoskeletal: Normal " range of motion.  Integumentary: Warm, Dry, Intact, No suspicious lesions or rashes.  Extremities: No clubbing, cyanosis or edema  Neurologic: No focal deficits, Cranial Nerves II-XII are grossly intact, Motor strength normal upper and lower extremities, Sensory exam intact.  Psychiatric: Normal interaction, Coherent speech, Euthymic mood, Appropriate affect         Assessment:       ICD-10-CM ICD-9-CM   1. Hypertension, unspecified type  I10 401.9   2. Prediabetes  R73.03 790.29   3. Lumbar radiculopathy  M54.16 724.4   4. Pre-operative clearance  Z01.818 V72.84   5. Abnormal finding of blood chemistry, unspecified  R79.9 790.6   6. Post-traumatic osteoarthritis, other specified site  M19.19 715.28   7. Pain in other specified joint  M25.59 719.48   8. Class 2 severe obesity due to excess calories with serious comorbidity and body mass index (BMI) of 37.0 to 37.9 in adult  E66.01 278.01    Z68.37 V85.37        Plan:     Problem List Items Addressed This Visit          Cardiac/Vascular    Hypertension - Primary    Relevant Medications    losartan-hydrochlorothiazide 100-25 mg (HYZAAR) 100-25 mg per tablet       Endocrine    Prediabetes    Relevant Medications    MOUNJARO 15 mg/0.5 mL PnIj     Other Visit Diagnoses       Lumbar radiculopathy        Relevant Orders    X-Ray Chest PA And Lateral    SCHEDULED EKG 12-LEAD (to [facility])    CBC Auto Differential    Comprehensive Metabolic Panel    Hemoglobin A1C    MRSA PCR    Protime-INR    APTT    Urinalysis, Reflex to Urine Culture    Pre-operative clearance        Relevant Orders    X-Ray Chest PA And Lateral    SCHEDULED EKG 12-LEAD (to [facility])    CBC Auto Differential    Comprehensive Metabolic Panel    Hemoglobin A1C    MRSA PCR    Protime-INR    APTT    Urinalysis, Reflex to Urine Culture    Abnormal finding of blood chemistry, unspecified        Relevant Orders    Hemoglobin A1C    Post-traumatic osteoarthritis, other specified site        Relevant Orders    Protime-INR     Pain in other specified joint        Relevant Orders    APTT    Class 2 severe obesity due to excess calories with serious comorbidity and body mass index (BMI) of 37.0 to 37.9 in adult        Relevant Medications    MOUNJARO 15 mg/0.5 mL PnIj         1. Hypertension, unspecified type  - losartan-hydrochlorothiazide 100-25 mg (HYZAAR) 100-25 mg per tablet; Take 0.5 tablets by mouth once daily.  Dispense: 90 tablet; Refill: 3  - BP is well controlled. Keep daily BP log. Continue followup with Cardiology as scheduled. Notify M.D. or ER if BP >170/100 or <90/60, chest pain, palpitations, headache, SOB, temp greater than 100.4, or any acute illness.   Continue  Low Sodium Diet (DASH Diet - Less than 2 grams of sodium per day).  Monitor blood pressure daily and log. Report consistent numbers greater than 140/90.  Smoking cessation encouraged to aid in BP reduction.  Maintain healthy weight with goal BMI <30. Exercise 30 minutes per day, 5 days per week.      2. Prediabetes  - MOUNJARO 15 mg/0.5 mL PnIj; Inject 15 mg into the skin every 7 days.  Dispense: 4 Pen; Refill: 11  - Stable, continue Mounjaro weekly as prescribed to help with blood sugar control and weight loss.   Lab Results   Component Value Date    HGBA1C 5.2 06/18/2024      Continue   Follow ADA Diet. Avoid soda, simple sweets, and limit rice/pasta/breads/starches.  Maintain healthy weight with goal BMI <30.  Exercise 5 times per week for 30 minutes per day.    3. Lumbar radiculopathy  - X-Ray Chest PA And Lateral; Future  - SCHEDULED EKG 12-LEAD (to [facility]); Future  - CBC Auto Differential; Future  - Comprehensive Metabolic Panel; Future  - Hemoglobin A1C; Future  - MRSA PCR; Future  - Protime-INR; Future  - APTT; Future  - Urinalysis, Reflex to Urine Culture; Future  - MRSA PCR  - If labs, EKG, and CXR are normal and patient is cleared by Cardiology, then patient has no contraindications to L-spine surgery at this time and she is medically cleared.     4.  Pre-operative clearance  - X-Ray Chest PA And Lateral; Future  - SCHEDULED EKG 12-LEAD (to [facility]); Future  - CBC Auto Differential; Future  - Comprehensive Metabolic Panel; Future  - Hemoglobin A1C; Future  - MRSA PCR; Future  - Protime-INR; Future  - APTT; Future  - Urinalysis, Reflex to Urine Culture; Future  - MRSA PCR  - If labs, EKG, and CXR are normal and patient is cleared by Cardiology, then patient has no contraindications to L-spine surgery at this time and she is medically cleared.     5. Abnormal finding of blood chemistry, unspecified  - Hemoglobin A1C; Future    6. Post-traumatic osteoarthritis, other specified site  - Protime-INR; Future    7. Pain in other specified joint  - APTT; Future    8. Class 2 severe obesity due to excess calories with serious comorbidity and body mass index (BMI) of 37.0 to 37.9 in adult  - MOUNJARO 15 mg/0.5 mL PnIj; Inject 15 mg into the skin every 7 days.  Dispense: 4 Pen; Refill: 11  - Same as #2.   Body mass index is 31.37 kg/m².  Goal BMI <30.  Exercise 5 times a week for 30 minutes per day.  Avoid soda, simple sugars, excessive rice, potatoes or bread. Limit fast foods and fried foods.  Choose complex carbs in moderation (example: green vegetables, beans, oatmeal). Eat plenty of fresh fruits and vegetables with lean meats daily.  Do not skip meals. Eat a balanced portion size.  Avoid fad diets. Consider permanent healthy life style changes.        Wen was seen today for hypertension and prediabetes.    Diagnoses and all orders for this visit:    Hypertension, unspecified type  -     losartan-hydrochlorothiazide 100-25 mg (HYZAAR) 100-25 mg per tablet; Take 0.5 tablets by mouth once daily.    Prediabetes  -     Discontinue: MOUNJARO 15 mg/0.5 mL PnIj; Inject 15 mg into the skin every 7 days.  -     MOUNJARO 15 mg/0.5 mL PnIj; Inject 15 mg into the skin every 7 days.    Lumbar radiculopathy  -     X-Ray Chest PA And Lateral; Future  -     SCHEDULED EKG 12-LEAD (to  [place]); Future  -     CBC Auto Differential; Future  -     Comprehensive Metabolic Panel; Future  -     Hemoglobin A1C; Future  -     MRSA PCR; Future  -     Protime-INR; Future  -     APTT; Future  -     Urinalysis, Reflex to Urine Culture; Future  -     MRSA PCR    Pre-operative clearance  -     X-Ray Chest PA And Lateral; Future  -     SCHEDULED EKG 12-LEAD (to [facility]); Future  -     CBC Auto Differential; Future  -     Comprehensive Metabolic Panel; Future  -     Hemoglobin A1C; Future  -     MRSA PCR; Future  -     Protime-INR; Future  -     APTT; Future  -     Urinalysis, Reflex to Urine Culture; Future  -     MRSA PCR    Abnormal finding of blood chemistry, unspecified  -     Hemoglobin A1C; Future    Post-traumatic osteoarthritis, other specified site  -     Protime-INR; Future    Pain in other specified joint  -     APTT; Future    Class 2 severe obesity due to excess calories with serious comorbidity and body mass index (BMI) of 37.0 to 37.9 in adult  -     Discontinue: MOUNJARO 15 mg/0.5 mL PnIj; Inject 15 mg into the skin every 7 days.  -     MOUNJARO 15 mg/0.5 mL PnIj; Inject 15 mg into the skin every 7 days.          Medication List with Changes/Refills   Current Medications    ATORVASTATIN (LIPITOR) 20 MG TABLET    TAKE ONE TABLET BY MOUTH EVERY DAY       Start Date: 6/24/2024 End Date: --    BUSPIRONE (BUSPAR) 15 MG TABLET    Take 1 tablet (15 mg total) by mouth 2 (two) times daily.       Start Date: 6/20/2024 End Date: 9/18/2024    CHOLECALCIFEROL, VITAMIN D3, 1,250 MCG (50,000 UNIT) CAPSULE    TAKE ONE CAPSULE BY MOUTH ONCE WEEKLY       Start Date: 8/22/2024 End Date: --    DILTIAZEM (TIAZAC) 240 MG CS24    Take 1 capsule (240 mg total) by mouth once daily.       Start Date: 10/31/2023End Date: 10/30/2024    LEVOCETIRIZINE (XYZAL) 5 MG TABLET    Take 1 tablet (5 mg total) by mouth every evening.       Start Date: 9/17/2024 End Date: --    MECLIZINE (ANTIVERT) 25 MG TABLET    Take 1 tablet (25 mg  total) by mouth 3 (three) times daily as needed for Dizziness (PRN dizziness).       Start Date: 7/15/2024 End Date: --    PHENAZOPYRIDINE (PYRIDIUM) 200 MG TABLET    Take 1 tablet (200 mg total) by mouth 3 (three) times daily as needed for Pain.       Start Date: 3/4/2024  End Date: --    SUVOREXANT (BELSOMRA) 20 MG TAB    Take 20 mg by mouth nightly as needed (insomnia).       Start Date: 5/10/2023 End Date: --    VALACYCLOVIR (VALTREX) 1000 MG TABLET    TAKE 2 TABLETS(2000 MG) BY MOUTH EVERY 12 HOURS FOR 2 DOSES       Start Date: 9/16/2024 End Date: --   Changed and/or Refilled Medications    Modified Medication Previous Medication    LOSARTAN-HYDROCHLOROTHIAZIDE 100-25 MG (HYZAAR) 100-25 MG PER TABLET losartan-hydrochlorothiazide 100-25 mg (HYZAAR) 100-25 mg per tablet       Take 0.5 tablets by mouth once daily.    TAKE ONE TABLET BY MOUTH EVERY DAY       Start Date: 9/26/2024 End Date: --    Start Date: 6/24/2024 End Date: 9/26/2024    MOUNJARO 15 MG/0.5 ML PNIJ MOUNJARO 15 mg/0.5 mL PnIj       Inject 15 mg into the skin every 7 days.    Inject 15 mg into the skin every 7 days.       Start Date: 9/26/2024 End Date: --    Start Date: 8/9/2024  End Date: 9/26/2024   Discontinued Medications    TIRZEPATIDE 12.5 MG/0.5 ML PNIJ    Inject 12.5 mg into the skin every 7 days.       Start Date: 6/7/2024  End Date: 9/26/2024          Follow up in about 3 months (around 12/26/2024) for Prediabetes Followup, Obesity Followup.

## 2024-09-30 ENCOUNTER — TELEPHONE (OUTPATIENT)
Dept: FAMILY MEDICINE | Facility: CLINIC | Age: 69
End: 2024-09-30
Payer: MEDICARE

## 2024-09-30 NOTE — TELEPHONE ENCOUNTER
----- Message from Gavino Doyle sent at 9/30/2024 10:13 AM CDT -----  Regarding: advice  Who Called: Wen Xiang    Caller is requesting assistance/information from provider's office.    Symptoms (please be specific):    How long has patient had these symptoms:    List of preferred pharmacies on file (remove unneeded): [unfilled]  If different, enter pharmacy into here including location and phone number:       Preferred Method of Contact: Phone Call  Patient's Preferred Phone Number on File: [phone]   Best Call Back Number, if different:    Additional Information:  Pt called asking if she can  her updated handicap parking ticket at office. Please advise

## 2024-09-30 NOTE — LETTER
I certify that (Name) Wne Otoole meets the requirements as outlined in # 1 &  6 (shown on reverse side) and qualifies for a mobility impaired license plate/hang-tag. I further understand that willful and false certification shall subject me to fines/imprisonment as outlined in R.S. 47:463.4 (G) (4). The applicant's information is as follows:    YOB: 1955            Race:Black or         Gender:Female    Address:  [address] [place] [address]:[place]                               State:[place]     Zip Code:[address]     []Permanently Impaired - Applicant has a total or lifelong condition of mobility impairment from which little or no improvement or recovery can reasonably be expected. A medical examiners certification is required on initial application only.      [x] Temporarily Impaired - Applicant has a temporary condition of mobility impairment of which improvement or recovery can reasonably be expected. Applicant is entitled to a hangtag, which will be valid for one (1) year. A medical examiners certification is required for the renewal of the hangtag      [] Unable to appear in person at the Office of Motor Vehicles - Applicant must bring facial photo        Medical Examiner's Signature________________________________________ Date:9/30/24_________________________    Printed Name:______Marianela Ulrich MD___________________   State License #______[id number]_________  Address: [address]  [address]                                     Phone Number: [phone]                                                                                                                                                                                                                  City: [place]__________________________________ State: [place] __Zip Code: [address] _______________    TO BE COMPLETED BY MOTOR VEHICLE ANALYST ONLY    RONIT   Lic. Plate #      Hangtag Control #   Hangtag ID  #      Date Issued:    #:   Office #:

## 2024-10-30 DIAGNOSIS — F41.1 GENERALIZED ANXIETY DISORDER: ICD-10-CM

## 2024-10-30 RX ORDER — BUSPIRONE HYDROCHLORIDE 15 MG/1
15 TABLET ORAL 2 TIMES DAILY
Qty: 60 TABLET | Refills: 2 | Status: SHIPPED | OUTPATIENT
Start: 2024-10-30

## 2025-01-03 ENCOUNTER — OFFICE VISIT (OUTPATIENT)
Dept: FAMILY MEDICINE | Facility: CLINIC | Age: 70
End: 2025-01-03
Payer: MEDICARE

## 2025-01-03 VITALS
SYSTOLIC BLOOD PRESSURE: 108 MMHG | HEART RATE: 68 BPM | OXYGEN SATURATION: 96 % | BODY MASS INDEX: 31.13 KG/M2 | DIASTOLIC BLOOD PRESSURE: 66 MMHG | HEIGHT: 61 IN | WEIGHT: 164.88 LBS

## 2025-01-03 DIAGNOSIS — B00.1 COLD SORE: Primary | ICD-10-CM

## 2025-01-03 DIAGNOSIS — Z20.2 EXPOSURE TO SEXUALLY TRANSMITTED DISEASE (STD): ICD-10-CM

## 2025-01-03 PROCEDURE — 1159F MED LIST DOCD IN RCRD: CPT | Mod: CPTII,,,

## 2025-01-03 PROCEDURE — 1126F AMNT PAIN NOTED NONE PRSNT: CPT | Mod: CPTII,,,

## 2025-01-03 PROCEDURE — 99213 OFFICE O/P EST LOW 20 MIN: CPT | Mod: ,,,

## 2025-01-03 PROCEDURE — 1101F PT FALLS ASSESS-DOCD LE1/YR: CPT | Mod: CPTII,,,

## 2025-01-03 PROCEDURE — 3288F FALL RISK ASSESSMENT DOCD: CPT | Mod: CPTII,,,

## 2025-01-03 PROCEDURE — 3074F SYST BP LT 130 MM HG: CPT | Mod: CPTII,,,

## 2025-01-03 PROCEDURE — 3078F DIAST BP <80 MM HG: CPT | Mod: CPTII,,,

## 2025-01-03 PROCEDURE — 1160F RVW MEDS BY RX/DR IN RCRD: CPT | Mod: CPTII,,,

## 2025-01-03 PROCEDURE — 3008F BODY MASS INDEX DOCD: CPT | Mod: CPTII,,,

## 2025-01-03 PROCEDURE — G2211 COMPLEX E/M VISIT ADD ON: HCPCS | Mod: ,,,

## 2025-01-03 RX ORDER — ACYCLOVIR 400 MG/1
400 TABLET ORAL 3 TIMES DAILY
Qty: 21 TABLET | Refills: 0 | Status: SHIPPED | OUTPATIENT
Start: 2025-01-03 | End: 2025-01-10

## 2025-01-03 NOTE — PROGRESS NOTES
Patient ID: [id number]     Chief Complaint: Follow-up (Lip blister)    HPI:     Wen Otoole is a 69 y.o. female here today for a sick visit for follow up for a blister on her lip x2 days. The patient has a history of cold sores. Associated symptom of burning, stinging sensation. She reports a history of a blister below her right eye which has resolved. She denies eye pain or visual changes. She self treated with mupirocin, no relief of symptom. The patient expressed concern about possible exposure to a STD and she would like STD testing. No other complaints today and she denies the need for medication refills today.     Past Medical History:   Diagnosis Date    Binge eating disorder     Generalized anxiety disorder     Hypertensive disorder     Insomnia     Obesity, unspecified     BENIGNO (obstructive sleep apnea)     Personal history of colonic polyps         Past Surgical History:   Procedure Laterality Date    BACK SURGERY      CHOLECYSTECTOMY      COLONOSCOPY  09/23/2020    KNEE ARTHROSCOPY      LUMBAR FUSION      NECK SURGERY          Social History     Tobacco Use    Smoking status: Former     Types: Cigarettes     Passive exposure: Past    Smokeless tobacco: Never   Substance and Sexual Activity    Alcohol use: Never    Drug use: Never    Sexual activity: Not Currently     Partners: Female     Birth control/protection: None        Current Outpatient Medications   Medication Instructions    acyclovir (ZOVIRAX) 400 mg, Oral, 3 times daily    atorvastatin (LIPITOR) 20 mg, Oral    busPIRone (BUSPAR) 15 mg, Oral, 2 times daily    cholecalciferol (vitamin D3) 50,000 Units, Oral, Every 7 days    diltiaZEM (TIAZAC) 240 mg, Oral, Daily    levocetirizine (XYZAL) 5 mg, Oral, Nightly    losartan-hydrochlorothiazide 100-25 mg (HYZAAR) 100-25 mg per tablet 0.5 tablets, Oral, Daily    meclizine (ANTIVERT) 25 mg, Oral, 3 times daily PRN    MOUNJARO 15 mg, Subcutaneous, Every 7 days    phenazopyridine (PYRIDIUM) 200 mg,  "Oral, 3 times daily PRN    suvorexant (BELSOMRA) 20 mg, Oral, Nightly PRN    valACYclovir (VALTREX) 1000 MG tablet TAKE 2 TABLETS(2000 MG) BY MOUTH EVERY 12 HOURS FOR 2 DOSES       Review of patient's allergies indicates:  No Known Allergies     Patient Care Team:  Marianela Ulrich MD as PCP - General (Family Medicine)  Cesar Tariq MD as Consulting Physician (Gastroenterology)  [facility] -     Subjective:     Review of Systems    Constitutional: Denies Change in appetite. Denies Chills. Denies Fever. Denies Night sweats.  Eye: Denies Blurred vision. Denies Discharge. Denies Eye pain.  ENT: Denies Decreased hearing. Denies Sore throat. Denies Swollen glands.  Respiratory: Denies Cough. Denies Shortness of breath. Denies Shortness of breath with exertion. Denies Wheezing.  Cardiovascular: Denies Chest pain at rest. Denies Chest pain with exertion. Denies Irregular heartbeat. Denies Palpitations.  Gastrointestinal: Denies Abdominal pain. Denies Diarrhea. Denies Nausea. Denies Vomiting. Denies Hematemesis or Hematochezia.  Genitourinary: Denies Dysuria. Denies Urinary frequency. Denies Urinary urgency. Denies Blood in urine.  Endocrine: Denies Cold intolerance. Denies Excessive thirst. Denies Heat intolerance. Denies Weight loss. Denies Weight gain.  Musculoskeletal: Denies Painful joints. Denies Weakness.  Integumentary: Denies Rash. Denies Itching. Denies Dry skin.  Neurologic: Denies Dizziness. Denies Fainting. Denies Headache.  Psychiatric: Denies Depression. Denies Anxiety. Denies Suicidal/Homicidal ideations.  12 point review of systems conducted, negative except as stated in the history of present illness. See HPI for details.    Objective:     Visit Vitals  /66   Pulse 68   Ht 5' 1" (1.549 m)   Wt 74.8 kg (164 lb 14.4 oz)   SpO2 96%   BMI 31.16 kg/m²     Physical Exam  General: Alert and oriented, No acute distress.    Head: Normocephalic, Atraumatic.  Eye: " Pupils are equal, round and reactive to light, Extraocular movements are intact, Sclera non-icteric.  Nose/Throat: Normal, Mucosa moist,Clear.  Respiratory: Clear to auscultation bilaterally, Non-labored respirations, Symmetrical chest wall expansion.  Cardiovascular: RRR, S1/S2 normal, No murmurs, rubs or gallops.  Gastrointestinal: Soft, Non-tender, Non-distended, Normal bowel sounds, No palpable organomegaly.  Genitourinary: No costovertebral angle tenderness.   Integumentary: Cold sore upper lip. Below right eye, no blister, skin is dry and intact.  Neurologic: No focal deficits  Psychiatric: Normal interaction, Coherent speech, Euthymic mood, Appropriate affect       Assessment:       ICD-10-CM ICD-9-CM   1. Cold sore  B00.1 054.9   2. Exposure to sexually transmitted disease (STD)  Z20.2 V01.6        Plan:     1. Cold sore  Assessment & Plan:  Start Rx trail of acyclovir as prescribed. Notify the office of medication side effects.   Avoid touching cold sore. Practice good hand hygiene.    Orders:  -     acyclovir (ZOVIRAX) 400 MG tablet; Take 1 tablet (400 mg total) by mouth 3 (three) times daily. for 7 days  Dispense: 21 tablet; Refill: 0    2. Exposure to sexually transmitted disease (STD)  Assessment & Plan:  Safe intercourse practices encouraged.     Orders:  -     HSV 1 & 2, IgG; Future; Expected date: 01/03/2025  -     SYPHILIS ANTIBODY (WITH REFLEX RPR); Future; Expected date: 01/03/2025  -     Chlamydia/GC, PCR; Future; Expected date: 01/03/2025         Keep future appointments as scheduled.   In addition to their scheduled follow up, the patient has also been instructed to follow up on as needed basis.       Future Appointments   Date Time Provider Department Center   1/28/2025  1:45 PM Marianela Ulrich MD [facility]    6/23/2025  1:00 PM Marianela Ulrich MD [facility]         MARVIN Lei

## 2025-01-05 PROBLEM — B00.1 COLD SORE: Status: ACTIVE | Noted: 2025-01-05

## 2025-01-05 PROBLEM — Z20.2 EXPOSURE TO SEXUALLY TRANSMITTED DISEASE (STD): Status: ACTIVE | Noted: 2025-01-05

## 2025-01-06 ENCOUNTER — LAB VISIT (OUTPATIENT)
Dept: LAB | Facility: HOSPITAL | Age: 70
End: 2025-01-06
Payer: MEDICARE

## 2025-01-06 ENCOUNTER — PATIENT MESSAGE (OUTPATIENT)
Dept: FAMILY MEDICINE | Facility: CLINIC | Age: 70
End: 2025-01-06
Payer: MEDICARE

## 2025-01-06 DIAGNOSIS — R79.9 ABNORMAL FINDING OF BLOOD CHEMISTRY, UNSPECIFIED: ICD-10-CM

## 2025-01-06 DIAGNOSIS — M54.16 LUMBAR RADICULOPATHY: ICD-10-CM

## 2025-01-06 DIAGNOSIS — Z01.818 PRE-OPERATIVE CLEARANCE: ICD-10-CM

## 2025-01-06 DIAGNOSIS — M25.59 PAIN IN OTHER SPECIFIED JOINT: ICD-10-CM

## 2025-01-06 DIAGNOSIS — Z20.2 EXPOSURE TO SEXUALLY TRANSMITTED DISEASE (STD): ICD-10-CM

## 2025-01-06 DIAGNOSIS — R74.8 ELEVATED LIVER ENZYMES: Primary | ICD-10-CM

## 2025-01-06 DIAGNOSIS — R30.0 DYSURIA: ICD-10-CM

## 2025-01-06 DIAGNOSIS — R74.01 ELEVATION OF LEVELS OF LIVER TRANSAMINASE LEVELS: ICD-10-CM

## 2025-01-06 DIAGNOSIS — M19.19 POST-TRAUMATIC OSTEOARTHRITIS, OTHER SPECIFIED SITE: ICD-10-CM

## 2025-01-06 LAB
ALBUMIN SERPL-MCNC: 4 G/DL (ref 3.4–4.8)
ALBUMIN/GLOB SERPL: 1 RATIO (ref 1.1–2)
ALP SERPL-CCNC: 197 UNIT/L (ref 40–150)
ALT SERPL-CCNC: 789 UNIT/L (ref 0–55)
AMORPH URATE CRY URNS QL MICRO: ABNORMAL /HPF
ANION GAP SERPL CALC-SCNC: 12 MEQ/L
APTT PPP: 29.7 SECONDS (ref 23.4–33.9)
AST SERPL-CCNC: 359 UNIT/L (ref 5–34)
BACTERIA #/AREA URNS AUTO: ABNORMAL /HPF
BASOPHILS # BLD AUTO: 0.02 X10(3)/MCL
BASOPHILS NFR BLD AUTO: 0.5 %
BILIRUB SERPL-MCNC: 1.1 MG/DL
BILIRUB UR QL STRIP.AUTO: NEGATIVE
BUN SERPL-MCNC: 19.6 MG/DL (ref 9.8–20.1)
C TRACH DNA SPEC QL NAA+PROBE: NOT DETECTED
CALCIUM SERPL-MCNC: 9.9 MG/DL (ref 8.4–10.2)
CHLORIDE SERPL-SCNC: 103 MMOL/L (ref 98–107)
CLARITY UR: ABNORMAL
CO2 SERPL-SCNC: 26 MMOL/L (ref 23–31)
COLOR UR AUTO: YELLOW
CREAT SERPL-MCNC: 0.98 MG/DL (ref 0.55–1.02)
CREAT/UREA NIT SERPL: 20
EOSINOPHIL # BLD AUTO: 0.24 X10(3)/MCL (ref 0–0.9)
EOSINOPHIL NFR BLD AUTO: 5.6 %
ERYTHROCYTE [DISTWIDTH] IN BLOOD BY AUTOMATED COUNT: 13.9 % (ref 11.5–17)
EST. AVERAGE GLUCOSE BLD GHB EST-MCNC: 105.4 MG/DL
GFR SERPLBLD CREATININE-BSD FMLA CKD-EPI: >60 ML/MIN/1.73/M2
GLOBULIN SER-MCNC: 3.9 GM/DL (ref 2.4–3.5)
GLUCOSE SERPL-MCNC: 85 MG/DL (ref 82–115)
GLUCOSE UR QL STRIP: NEGATIVE
HBA1C MFR BLD: 5.3 %
HCT VFR BLD AUTO: 48.3 % (ref 37–47)
HGB BLD-MCNC: 15.6 G/DL (ref 12–16)
HGB UR QL STRIP: ABNORMAL
IMM GRANULOCYTES # BLD AUTO: 0.01 X10(3)/MCL (ref 0–0.04)
IMM GRANULOCYTES NFR BLD AUTO: 0.2 %
INR PPP: 1 (ref 2–3)
KETONES UR QL STRIP: NEGATIVE
LEUKOCYTE ESTERASE UR QL STRIP: NEGATIVE
LYMPHOCYTES # BLD AUTO: 1.66 X10(3)/MCL (ref 0.6–4.6)
LYMPHOCYTES NFR BLD AUTO: 39.1 %
MCH RBC QN AUTO: 30.3 PG (ref 27–31)
MCHC RBC AUTO-ENTMCNC: 32.3 G/DL (ref 33–36)
MCV RBC AUTO: 93.8 FL (ref 80–94)
MONOCYTES # BLD AUTO: 0.41 X10(3)/MCL (ref 0.1–1.3)
MONOCYTES NFR BLD AUTO: 9.6 %
N GONORRHOEA DNA SPEC QL NAA+PROBE: NOT DETECTED
NEUTROPHILS # BLD AUTO: 1.91 X10(3)/MCL (ref 2.1–9.2)
NEUTROPHILS NFR BLD AUTO: 45 %
NITRITE UR QL STRIP: NEGATIVE
NRBC BLD AUTO-RTO: 0 %
PH UR STRIP: 6 [PH]
PLATELET # BLD AUTO: 214 X10(3)/MCL (ref 130–400)
PMV BLD AUTO: 10.9 FL (ref 7.4–10.4)
POTASSIUM SERPL-SCNC: 4.7 MMOL/L (ref 3.5–5.1)
PROT SERPL-MCNC: 7.9 GM/DL (ref 5.8–7.6)
PROT UR QL STRIP: NEGATIVE
PROTHROMBIN TIME: 13.4 SECONDS (ref 11.7–14.5)
RBC # BLD AUTO: 5.15 X10(6)/MCL (ref 4.2–5.4)
RBC #/AREA URNS AUTO: ABNORMAL /HPF
SODIUM SERPL-SCNC: 141 MMOL/L (ref 136–145)
SOURCE (OHS): NORMAL
SP GR UR STRIP.AUTO: >=1.03 (ref 1–1.03)
SQUAMOUS #/AREA URNS AUTO: ABNORMAL /HPF
T PALLIDUM AB SER QL: NONREACTIVE
UROBILINOGEN UR STRIP-ACNC: 0.2
WBC # BLD AUTO: 4.25 X10(3)/MCL (ref 4.5–11.5)
WBC #/AREA URNS AUTO: ABNORMAL /HPF

## 2025-01-06 PROCEDURE — 36415 COLL VENOUS BLD VENIPUNCTURE: CPT

## 2025-01-06 PROCEDURE — 85610 PROTHROMBIN TIME: CPT

## 2025-01-06 PROCEDURE — 87086 URINE CULTURE/COLONY COUNT: CPT

## 2025-01-06 PROCEDURE — 85025 COMPLETE CBC W/AUTO DIFF WBC: CPT

## 2025-01-06 PROCEDURE — 83036 HEMOGLOBIN GLYCOSYLATED A1C: CPT

## 2025-01-06 PROCEDURE — 86695 HERPES SIMPLEX TYPE 1 TEST: CPT

## 2025-01-06 PROCEDURE — 87491 CHLMYD TRACH DNA AMP PROBE: CPT

## 2025-01-06 PROCEDURE — 80053 COMPREHEN METABOLIC PANEL: CPT

## 2025-01-06 PROCEDURE — 85730 THROMBOPLASTIN TIME PARTIAL: CPT

## 2025-01-06 PROCEDURE — 86780 TREPONEMA PALLIDUM: CPT

## 2025-01-06 PROCEDURE — 81003 URINALYSIS AUTO W/O SCOPE: CPT

## 2025-01-06 NOTE — ASSESSMENT & PLAN NOTE
Start Rx trail of acyclovir as prescribed. Notify the office of medication side effects.   Avoid touching cold sore. Practice good hand hygiene.

## 2025-01-07 ENCOUNTER — PATIENT MESSAGE (OUTPATIENT)
Dept: FAMILY MEDICINE | Facility: CLINIC | Age: 70
End: 2025-01-07
Payer: MEDICARE

## 2025-01-07 ENCOUNTER — TELEPHONE (OUTPATIENT)
Dept: FAMILY MEDICINE | Facility: CLINIC | Age: 70
End: 2025-01-07
Payer: MEDICARE

## 2025-01-07 LAB
HSV1 IGG SERPL QL IA: POSITIVE
HSV2 IGG SERPL QL IA: POSITIVE

## 2025-01-07 NOTE — TELEPHONE ENCOUNTER
----- Message from Gladys sent at 1/6/2025 12:30 PM CST -----  Regarding: results  .Who Called: Wen Otoole    Caller is requesting information on test results.    Name of Test (Lab/Mammo/Etc): labs  Date of Test: 1/6/25  Where the test was performed: [facility]  Ordering Provider: Migue    Preferred Method of Contact: Phone Call  Patient's Preferred Phone Number on File: [phone]   Best Call Back Number, if different:  Additional Information: pt requesting a call back

## 2025-01-07 NOTE — TELEPHONE ENCOUNTER
----- Message from MARVIN Xiong sent at 1/7/2025  3:27 AM CST -----  STD testing is negative.  
Patient notified, verbalized understanding  
independent

## 2025-01-08 ENCOUNTER — TELEPHONE (OUTPATIENT)
Dept: FAMILY MEDICINE | Facility: CLINIC | Age: 70
End: 2025-01-08
Payer: MEDICARE

## 2025-01-08 LAB — BACTERIA UR CULT: NORMAL

## 2025-01-08 NOTE — TELEPHONE ENCOUNTER
----- Message from Marianela Ulrich MD sent at 1/8/2025  1:18 PM CST -----  Final urine culture is negative for bacterial urinary tract infection. Thank you for choosing [facility] for your medical needs. Have a great day!    -Marianela Ulrich MD, FAAFP

## 2025-01-13 ENCOUNTER — LAB VISIT (OUTPATIENT)
Dept: LAB | Facility: HOSPITAL | Age: 70
End: 2025-01-13
Attending: FAMILY MEDICINE
Payer: MEDICARE

## 2025-01-13 ENCOUNTER — OFFICE VISIT (OUTPATIENT)
Dept: FAMILY MEDICINE | Facility: CLINIC | Age: 70
End: 2025-01-13
Payer: MEDICARE

## 2025-01-13 VITALS
HEART RATE: 66 BPM | HEIGHT: 61 IN | DIASTOLIC BLOOD PRESSURE: 72 MMHG | SYSTOLIC BLOOD PRESSURE: 158 MMHG | OXYGEN SATURATION: 98 % | BODY MASS INDEX: 31.41 KG/M2 | RESPIRATION RATE: 16 BRPM | WEIGHT: 166.38 LBS | TEMPERATURE: 98 F

## 2025-01-13 DIAGNOSIS — E66.811 CLASS 1 OBESITY DUE TO EXCESS CALORIES WITH SERIOUS COMORBIDITY AND BODY MASS INDEX (BMI) OF 31.0 TO 31.9 IN ADULT: ICD-10-CM

## 2025-01-13 DIAGNOSIS — R74.01 ELEVATION OF LEVELS OF LIVER TRANSAMINASE LEVELS: ICD-10-CM

## 2025-01-13 DIAGNOSIS — R74.8 ELEVATED LIVER ENZYMES: ICD-10-CM

## 2025-01-13 DIAGNOSIS — E66.09 CLASS 1 OBESITY DUE TO EXCESS CALORIES WITH SERIOUS COMORBIDITY AND BODY MASS INDEX (BMI) OF 31.0 TO 31.9 IN ADULT: ICD-10-CM

## 2025-01-13 DIAGNOSIS — L30.9 PERIORBITAL DERMATITIS: Primary | ICD-10-CM

## 2025-01-13 LAB
HAV IGM SERPL QL IA: NONREACTIVE
HBV CORE IGM SERPL QL IA: REACTIVE
HBV SURFACE AG SERPL QL IA: NONREACTIVE
HCV AB SERPL QL IA: NONREACTIVE

## 2025-01-13 PROCEDURE — 3008F BODY MASS INDEX DOCD: CPT | Mod: CPTII,,, | Performed by: FAMILY MEDICINE

## 2025-01-13 PROCEDURE — 96372 THER/PROPH/DIAG INJ SC/IM: CPT | Mod: ,,, | Performed by: FAMILY MEDICINE

## 2025-01-13 PROCEDURE — 3078F DIAST BP <80 MM HG: CPT | Mod: CPTII,,, | Performed by: FAMILY MEDICINE

## 2025-01-13 PROCEDURE — 3044F HG A1C LEVEL LT 7.0%: CPT | Mod: CPTII,,, | Performed by: FAMILY MEDICINE

## 2025-01-13 PROCEDURE — 1126F AMNT PAIN NOTED NONE PRSNT: CPT | Mod: CPTII,,, | Performed by: FAMILY MEDICINE

## 2025-01-13 PROCEDURE — 1160F RVW MEDS BY RX/DR IN RCRD: CPT | Mod: CPTII,,, | Performed by: FAMILY MEDICINE

## 2025-01-13 PROCEDURE — 1101F PT FALLS ASSESS-DOCD LE1/YR: CPT | Mod: CPTII,,, | Performed by: FAMILY MEDICINE

## 2025-01-13 PROCEDURE — 36415 COLL VENOUS BLD VENIPUNCTURE: CPT

## 2025-01-13 PROCEDURE — 99214 OFFICE O/P EST MOD 30 MIN: CPT | Mod: 25,,, | Performed by: FAMILY MEDICINE

## 2025-01-13 PROCEDURE — 80074 ACUTE HEPATITIS PANEL: CPT

## 2025-01-13 PROCEDURE — 3077F SYST BP >= 140 MM HG: CPT | Mod: CPTII,,, | Performed by: FAMILY MEDICINE

## 2025-01-13 PROCEDURE — 1159F MED LIST DOCD IN RCRD: CPT | Mod: CPTII,,, | Performed by: FAMILY MEDICINE

## 2025-01-13 PROCEDURE — 3288F FALL RISK ASSESSMENT DOCD: CPT | Mod: CPTII,,, | Performed by: FAMILY MEDICINE

## 2025-01-13 RX ORDER — DEXAMETHASONE SODIUM PHOSPHATE 4 MG/ML
4 INJECTION, SOLUTION INTRA-ARTICULAR; INTRALESIONAL; INTRAMUSCULAR; INTRAVENOUS; SOFT TISSUE
Status: DISCONTINUED | OUTPATIENT
Start: 2025-01-13 | End: 2025-01-13

## 2025-01-13 RX ORDER — DEXAMETHASONE SODIUM PHOSPHATE 10 MG/ML
4 INJECTION INTRAMUSCULAR; INTRAVENOUS
Status: DISCONTINUED | OUTPATIENT
Start: 2025-01-13 | End: 2025-01-13

## 2025-01-13 RX ORDER — DEXAMETHASONE SODIUM PHOSPHATE 4 MG/ML
4 INJECTION, SOLUTION INTRA-ARTICULAR; INTRALESIONAL; INTRAMUSCULAR; INTRAVENOUS; SOFT TISSUE
Status: COMPLETED | OUTPATIENT
Start: 2025-01-13 | End: 2025-01-13

## 2025-01-13 RX ORDER — METHYLPREDNISOLONE 4 MG/1
TABLET ORAL
Qty: 21 EACH | Refills: 0 | Status: SHIPPED | OUTPATIENT
Start: 2025-01-13 | End: 2025-02-03

## 2025-01-13 RX ADMIN — DEXAMETHASONE SODIUM PHOSPHATE 4 MG: 4 INJECTION, SOLUTION INTRA-ARTICULAR; INTRALESIONAL; INTRAMUSCULAR; INTRAVENOUS; SOFT TISSUE at 04:01

## 2025-01-13 NOTE — PATIENT INSTRUCTIONS
Khari Carver,     If you are due for any health screening(s) below please notify me so we can arrange them to be ordered and scheduled. Most healthy patients at your age complete them, but you are free to accept or refuse.     If you can't do it, I'll definitely understand. If you can, I'd certainly appreciate it!    Tests to Keep You Healthy    Mammogram: Met on 3/12/2024  Colon Cancer Screening: Met on 9/23/2020  Last Blood Pressure <= 139/89 (1/13/2025): NO      Lets manage your high blood pressure     Your blood pressure was above 140/90 today during your visit. We recommend that you schedule a nurse visit in two weeks to check your blood pressure and discuss ways to support your health goals.     You can also manage your health and record your blood pressure from the comfort of home by keeping a daily blood pressure log. These results are shared with and reviewed by your provider. Please print this form (Daily Blood Pressure Log) to assist you in keeping track of your blood pressure at home.     Schedule your nurse visit in two weeks to learn more about how to track and manage high blood pressure.    Daily Blood Pressure Log    Name:__________________________________                  Date of Birth:_________    Average Blood Pressure:  __________      Date: Time  (a.m.) Blood  Pressure: Pulse  Rate: Time  (p.m.) Blood  Pressure : Pulse  Rate:   Sample 8:37 127/83 84

## 2025-01-13 NOTE — PROGRESS NOTES
Patient ID: [id number]     Chief Complaint: Follow-up (Blisters around the eye)        HPI:     Wen Otoole is a 69 y.o. female here today complaining of blisters and swelling around eyes x 1-2 weeks. Was seen by NP and was diagnosed with HSV and treated with Acyclovir without resolution of symptoms. She has been putting Mupirocin on her eyelids. She reports itching and crusting. She denies visual changes. She is scheduled for US Abdomen on 01/14/2025 due to elevated LFTs. She is UTD on eye exam for 2024.   - She is obese, doing well with Mounjaro, no side effects. She is not interested in seeing a dietician.   - Patient is without any other complaints today.         -------------------------------------    Binge eating disorder    Generalized anxiety disorder    Hypertensive disorder    Insomnia    Obesity, unspecified    BENIGNO (obstructive sleep apnea)    Personal history of colonic polyps        Past Surgical History:   Procedure Laterality Date    BACK SURGERY      CHOLECYSTECTOMY      COLONOSCOPY  09/23/2020    KNEE ARTHROSCOPY      LUMBAR FUSION      NECK SURGERY         Review of patient's allergies indicates:  No Known Allergies    Outpatient Medications Marked as Taking for the 1/13/25 encounter (Office Visit) with Marianela Ulrich MD   Medication Sig Dispense Refill    acyclovir (ZOVIRAX) 400 MG tablet Take 1 tablet (400 mg total) by mouth 3 (three) times daily. for 7 days 21 tablet 0    atorvastatin (LIPITOR) 20 MG tablet TAKE ONE TABLET BY MOUTH EVERY DAY 90 tablet 3    busPIRone (BUSPAR) 15 MG tablet TAKE 1 TABLET(15 MG) BY MOUTH TWICE DAILY 60 tablet 2    cholecalciferol, vitamin D3, 1,250 mcg (50,000 unit) capsule TAKE ONE CAPSULE BY MOUTH ONCE WEEKLY 12 capsule 1    diltiaZEM (TIAZAC) 240 MG Cs24 Take 1 capsule (240 mg total) by mouth once daily. 90 capsule 3    levocetirizine (XYZAL) 5 MG tablet Take 1 tablet (5 mg total) by mouth every evening. 90 tablet 3    losartan-hydrochlorothiazide  100-25 mg (HYZAAR) 100-25 mg per tablet Take 0.5 tablets by mouth once daily. 90 tablet 3    meclizine (ANTIVERT) 25 mg tablet Take 1 tablet (25 mg total) by mouth 3 (three) times daily as needed for Dizziness (PRN dizziness). 30 tablet 0    MOUNJARO 15 mg/0.5 mL PnIj Inject 15 mg into the skin every 7 days. 4 Pen 11    phenazopyridine (PYRIDIUM) 200 MG tablet Take 1 tablet (200 mg total) by mouth 3 (three) times daily as needed for Pain. 6 tablet 0    suvorexant (BELSOMRA) 20 mg Tab Take 20 mg by mouth nightly as needed (insomnia). 30 tablet 5    valACYclovir (VALTREX) 1000 MG tablet TAKE 2 TABLETS(2000 MG) BY MOUTH EVERY 12 HOURS FOR 2 DOSES 4 tablet 1     Current Facility-Administered Medications for the 1/13/25 encounter (Office Visit) with Marianela Ulrich MD   Medication Dose Route Frequency Provider Last Rate Last Admin    [COMPLETED] dexAMETHasone injection 4 mg  4 mg Intramuscular 1 time in Clinic/HOD Marianela Ulrich MD   4 mg at 01/13/25 1640    [DISCONTINUED] dexAMETHasone injection 4 mg  4 mg Intramuscular 1 time in Clinic/HOD         [DISCONTINUED] dexAMETHasone injection 4 mg  4 mg Intravenous 1 time in Clinic/HOD Marianela Ulrihc MD           Social History     Socioeconomic History    Marital status: Single   Tobacco Use    Smoking status: Former     Types: Cigarettes     Passive exposure: Past    Smokeless tobacco: Never   Substance and Sexual Activity    Alcohol use: Never    Drug use: Never    Sexual activity: Not Currently     Partners: Female     Birth control/protection: None     Social Drivers of Health     Financial Resource Strain: Low Risk  (9/23/2024)    Overall Financial Resource Strain (CARDIA)     Difficulty of Paying Living Expenses: Not very hard   Food Insecurity: No Food Insecurity (9/23/2024)    Hunger Vital Sign     Worried About Running Out of Food in the Last Year: Never true     Ran Out of Food in the Last Year: Never true   Transportation Needs: No  Transportation Needs (6/19/2024)    TRANSPORTATION NEEDS     Transportation : No   Physical Activity: Insufficiently Active (9/23/2024)    Exercise Vital Sign     Days of Exercise per Week: 2 days     Minutes of Exercise per Session: 60 min   Stress: No Stress Concern Present (9/23/2024)    [employer] - Occupational Stress Questionnaire     Feeling of Stress : Only a little   Housing Stability: Low Risk  (9/23/2024)    Housing Stability Vital Sign     Unable to Pay for Housing in the Last Year: No     Homeless in the Last Year: No        Family History   Problem Relation Name Age of Onset    Hypertension Mother Magnolia     Diabetes Mother Magnolia     Coronary artery disease Mother Magnolia     Coronary artery disease Brother          Subjective:       Review of Systems:    See HPI for details    Constitutional: Denies Change in appetite. Denies Chills. Denies Fever. Denies Night sweats.  Eye: Denies Blurred vision. Denies Discharge. Denies Eye pain.  ENT: Denies Decreased hearing. Denies Sore throat. Denies Swollen glands.  Respiratory: Denies Cough. Denies Shortness of breath. Denies Shortness of breath with exertion. Denies Wheezing.  Cardiovascular: Denies Chest pain at rest. Denies Chest pain with exertion. Denies Irregular heartbeat. Denies Palpitations.  Gastrointestinal: Denies Abdominal pain. Denies Diarrhea. Denies Nausea. Denies Vomiting. Denies Hematemesis or Hematochezia.  Genitourinary: Denies Dysuria. Denies Urinary frequency. Denies Urinary urgency. Denies Blood in urine.  Endocrine: Denies Cold intolerance. Denies Excessive thirst. Denies Heat intolerance. Denies Weight loss. Denies Weight gain.  Musculoskeletal: Denies Painful joints. Denies Weakness.  Integumentary: Reports Rash. Reports Itching. Denies Dry skin.  Neurologic: Denies Dizziness. Denies Fainting. Denies Headache.  Psychiatric: Denies Depression. Denies Anxiety. Denies Suicidal/Homicidal ideations.    All Other ROS:  "Negative except as stated in HPI.       Objective:     BP (!) 158/72 (BP Location: Left arm, Patient Position: Sitting)   Pulse 66   Temp 97.6 °F (36.4 °C) (Oral)   Resp 16   Ht 5' 1" (1.549 m)   Wt 75.5 kg (166 lb 6.4 oz)   SpO2 98%   BMI 31.44 kg/m²     Physical Exam    General: Alert and oriented, No acute distress. Obese.   Head: Normocephalic, Atraumatic.  Eye: Pupils are equal, round and reactive to light, Extraocular movements are intact, Sclera non-icteric. Bilateral upper and lower eyelids with swelling, scant vesicular rash, non-tender, no purulent drainage consistent with allergic reaction versus auto-immune disease.  Ears/Nose/Throat: Normal, Mucosa moist,Clear.  Neck/Thyroid: Supple, Non-tender, No carotid bruit, No palpable thyromegaly or thyroid nodule, No lymphadenopathy, No JVD, Full range of motion.  Respiratory: Clear to auscultation bilaterally; No wheezes, rales or rhonchi,Non-labored respirations, Symmetrical chest wall expansion.  Cardiovascular: Regular rate and rhythm, S1/S2 normal, No murmurs, rubs or gallops.  Gastrointestinal: Soft, Non-tender, Non-distended, Normal bowel sounds, No palpable organomegaly.  Musculoskeletal: Normal range of motion.  Integumentary: Warm, Dry, Intact, No suspicious lesions or rashes.  Extremities: No clubbing, cyanosis or edema  Neurologic: No focal deficits, Cranial Nerves II-XII are grossly intact, Motor strength normal upper and lower extremities, Sensory exam intact.  Psychiatric: Normal interaction, Coherent speech, Euthymic mood, Appropriate affect         Assessment:       ICD-10-CM ICD-9-CM   1. Periorbital dermatitis  L30.9 692.9   2. Class 1 obesity due to excess calories with serious comorbidity and body mass index (BMI) of 31.0 to 31.9 in adult  E66.811 278.00    E66.09 V85.31    Z68.31         Plan:     Problem List Items Addressed This Visit    None  Visit Diagnoses       Periorbital dermatitis    -  Primary    Relevant Medications    " methylPREDNISolone (MEDROL DOSEPACK) 4 mg tablet    dexAMETHasone injection 4 mg (Completed)    Other Relevant Orders    C-Reactive Protein    Sedimentation rate    ANTINUCLEAR ANTIBODIES COMPREHENSIVE PANEL    CYCLIC CITRULLINATED PEPTIDE (CCP) ANTIBODY    RHEUMATOID FACTOR IGA    RHEUMATOID FACTOR IGM    Immunoglobulins (IgG, IgA, IgM) Quantitative    Anti-Neutrophilic Cytoplasmic Antibody    Class 1 obesity due to excess calories with serious comorbidity and body mass index (BMI) of 31.0 to 31.9 in adult             1. Periorbital dermatitis  - dexAMETHasone injection 4 mg IM x 1 today  - Rx trial of methylPREDNISolone (MEDROL DOSEPACK) 4 mg tablet; use as directed  Dispense: 21 each; Refill: 0  - C-Reactive Protein; Future  - Sedimentation rate; Future  - ANTINUCLEAR ANTIBODIES COMPREHENSIVE PANEL; Future  - CYCLIC CITRULLINATED PEPTIDE (CCP) ANTIBODY; Future  - RHEUMATOID FACTOR IGA; Future  - RHEUMATOID FACTOR IGM; Future  - Immunoglobulins (IgG, IgA, IgM) Quantitative; Future  - Anti-Neutrophilic Cytoplasmic Antibody; Future  - If no improvement and workup is negative, will proceed with Ophthalmology/Dermatology referral. Notify M.D. or ER if symptoms persist or worsen, visual changes, purulent drainage, temp >100.4, or any acute illness.        Wen was seen today for follow-up.    Diagnoses and all orders for this visit:    Periorbital dermatitis  -     Discontinue: dexAMETHasone injection 4 mg  -     methylPREDNISolone (MEDROL DOSEPACK) 4 mg tablet; use as directed  -     C-Reactive Protein; Future  -     Sedimentation rate; Future  -     ANTINUCLEAR ANTIBODIES COMPREHENSIVE PANEL; Future  -     CYCLIC CITRULLINATED PEPTIDE (CCP) ANTIBODY; Future  -     RHEUMATOID FACTOR IGA; Future  -     RHEUMATOID FACTOR IGM; Future  -     Immunoglobulins (IgG, IgA, IgM) Quantitative; Future  -     Anti-Neutrophilic Cytoplasmic Antibody; Future  -     Discontinue: dexAMETHasone injection 4 mg  -     dexAMETHasone  injection 4 mg    Class 1 obesity due to excess calories with serious comorbidity and body mass index (BMI) of 31.0 to 31.9 in adult          Medication List with Changes/Refills   New Medications    METHYLPREDNISOLONE (MEDROL DOSEPACK) 4 MG TABLET    use as directed       Start Date: 1/13/2025 End Date: 2/3/2025   Current Medications    ACYCLOVIR (ZOVIRAX) 400 MG TABLET    Take 1 tablet (400 mg total) by mouth 3 (three) times daily. for 7 days       Start Date: 1/3/2025  End Date: 1/13/2025    ATORVASTATIN (LIPITOR) 20 MG TABLET    TAKE ONE TABLET BY MOUTH EVERY DAY       Start Date: 6/24/2024 End Date: --    BUSPIRONE (BUSPAR) 15 MG TABLET    TAKE 1 TABLET(15 MG) BY MOUTH TWICE DAILY       Start Date: 10/30/2024End Date: --    CHOLECALCIFEROL, VITAMIN D3, 1,250 MCG (50,000 UNIT) CAPSULE    TAKE ONE CAPSULE BY MOUTH ONCE WEEKLY       Start Date: 8/22/2024 End Date: --    DILTIAZEM (TIAZAC) 240 MG CS24    Take 1 capsule (240 mg total) by mouth once daily.       Start Date: 10/31/2023End Date: 1/13/2025    LEVOCETIRIZINE (XYZAL) 5 MG TABLET    Take 1 tablet (5 mg total) by mouth every evening.       Start Date: 9/17/2024 End Date: --    LOSARTAN-HYDROCHLOROTHIAZIDE 100-25 MG (HYZAAR) 100-25 MG PER TABLET    Take 0.5 tablets by mouth once daily.       Start Date: 9/26/2024 End Date: --    MECLIZINE (ANTIVERT) 25 MG TABLET    Take 1 tablet (25 mg total) by mouth 3 (three) times daily as needed for Dizziness (PRN dizziness).       Start Date: 7/15/2024 End Date: --    MOUNJARO 15 MG/0.5 ML PNIJ    Inject 15 mg into the skin every 7 days.       Start Date: 9/26/2024 End Date: --    PHENAZOPYRIDINE (PYRIDIUM) 200 MG TABLET    Take 1 tablet (200 mg total) by mouth 3 (three) times daily as needed for Pain.       Start Date: 3/4/2024  End Date: --    SUVOREXANT (BELSOMRA) 20 MG TAB    Take 20 mg by mouth nightly as needed (insomnia).       Start Date: 5/10/2023 End Date: --    VALACYCLOVIR (VALTREX) 1000 MG TABLET    TAKE 2  TABLETS(2000 MG) BY MOUTH EVERY 12 HOURS FOR 2 DOSES       Start Date: 9/16/2024 End Date: --          Follow up Keep appointment as scheduled or sooner if symptoms worsen or fail to improve.

## 2025-01-14 ENCOUNTER — HOSPITAL ENCOUNTER (OUTPATIENT)
Dept: RADIOLOGY | Facility: HOSPITAL | Age: 70
Discharge: HOME | End: 2025-01-14
Attending: FAMILY MEDICINE
Payer: MEDICARE

## 2025-01-14 DIAGNOSIS — R74.8 ELEVATED LIVER ENZYMES: ICD-10-CM

## 2025-01-14 PROCEDURE — 76705 ECHO EXAM OF ABDOMEN: CPT | Mod: TC

## 2025-01-15 DIAGNOSIS — K86.89 PANCREATIC DUCT DILATED: Primary | ICD-10-CM

## 2025-01-15 DIAGNOSIS — R76.8 HEPATITIS B ANTIBODY POSITIVE: Primary | ICD-10-CM

## 2025-01-15 DIAGNOSIS — K76.0 FATTY LIVER: ICD-10-CM

## 2025-01-16 ENCOUNTER — PATIENT MESSAGE (OUTPATIENT)
Dept: FAMILY MEDICINE | Facility: CLINIC | Age: 70
End: 2025-01-16
Payer: MEDICARE

## 2025-01-16 ENCOUNTER — TELEPHONE (OUTPATIENT)
Dept: FAMILY MEDICINE | Facility: CLINIC | Age: 70
End: 2025-01-16
Payer: MEDICARE

## 2025-01-16 NOTE — TELEPHONE ENCOUNTER
----- Message from Marianela Ulrich MD sent at 1/15/2025  7:50 PM CST -----  IgG, IgA, IgM, CRP, and sed rate are normal. Remaining auto-immune disease is still pending. Thank you for choosing [facility] for your medical needs. Have a great day!    -Marianela Ulrich MD, FAAFP

## 2025-01-16 NOTE — TELEPHONE ENCOUNTER
----- Message from Marianela Ulrich MD sent at 1/15/2025  7:49 PM CST -----  Ultrasound right upper quadrant shows that she has fatty deposits in her liver. In most cases, the fat buildup doesn't cause serious problems or prevent your liver from functioning normally and typically doesn't cause symptoms. There's no specific treatment or medications for fatty liver. Instead, we focus on helping you manage risk factors that contribute to the condition. This includes making lifestyle changes that can improve your health.  These include:  - Avoid alcohol: Steer clear of alcohol even if your SLD isn't related to alcohol use.  - Lose weight: Exercising, changing what you eat and drink (under the supervision of a nutritionist) and taking medications, such as GLP1RA, can help with weight loss. You may qualify for bariatric surgery, which can also help you lose weight. Follow a balanced diet to lose weight slowly but steadily. Healthcare providers often recommend steering clear of sugar and trying the Mediterranean diet, which is high in vegetables, fruits and good fats. Other foods and diets rich with nuts, seeds, whole grains, and fish and chicken are helpful food choices for fatty liver. It's also important to avoid eating too much red meat or drinking sugary beverages.  - Take medications to manage metabolic conditions: Take prescribed medicines to manage diabetes, cholesterol and triglycerides (fat in the blood). You may also need to take vitamin E and thiazolidinediones (drugs used to treat diabetes, such as Actos® and Avandia®) in specific instances.     - She does have slight prominence of the pancreatic duct, order for MRECP is in file for further evaluation and treatment recommendations.     - Remaining Ultrasound results are normal.     Thank you for choosing [facility] for your medical needs. Have a great day!    -Marianela Ulrich MD, FAAFP

## 2025-01-16 NOTE — TELEPHONE ENCOUNTER
----- Message from Marianela Ulrich MD sent at 1/15/2025  7:59 PM CST -----  - Hepatitis B testing is reactive, order for Hepatitis B viral load placed for further evaluation and to determine if there is active infection, my office staff will contact the patient to add.   Thank you for choosing [facility] for your medical needs. Have a great day!    -Marianela Ulrich MD, FAAFP

## 2025-01-17 ENCOUNTER — PATIENT MESSAGE (OUTPATIENT)
Dept: FAMILY MEDICINE | Facility: CLINIC | Age: 70
End: 2025-01-17
Payer: MEDICARE

## 2025-01-27 ENCOUNTER — HOSPITAL ENCOUNTER (OUTPATIENT)
Dept: RADIOLOGY | Facility: HOSPITAL | Age: 70
Discharge: HOME | End: 2025-01-27
Attending: FAMILY MEDICINE
Payer: MEDICARE

## 2025-01-27 DIAGNOSIS — K76.0 FATTY LIVER: ICD-10-CM

## 2025-01-27 DIAGNOSIS — K86.89 PANCREATIC DUCT DILATED: ICD-10-CM

## 2025-01-27 DIAGNOSIS — K86.89 PANCREATIC DUCT DILATED: Primary | ICD-10-CM

## 2025-01-27 PROCEDURE — 74181 MRI ABDOMEN W/O CONTRAST: CPT | Mod: TC

## 2025-01-28 ENCOUNTER — OFFICE VISIT (OUTPATIENT)
Dept: FAMILY MEDICINE | Facility: CLINIC | Age: 70
End: 2025-01-28
Payer: MEDICARE

## 2025-01-28 ENCOUNTER — TELEPHONE (OUTPATIENT)
Dept: FAMILY MEDICINE | Facility: CLINIC | Age: 70
End: 2025-01-28

## 2025-01-28 VITALS
SYSTOLIC BLOOD PRESSURE: 179 MMHG | OXYGEN SATURATION: 95 % | HEIGHT: 61 IN | DIASTOLIC BLOOD PRESSURE: 93 MMHG | HEART RATE: 75 BPM | TEMPERATURE: 99 F | BODY MASS INDEX: 31.34 KG/M2 | WEIGHT: 166 LBS | RESPIRATION RATE: 18 BRPM

## 2025-01-28 DIAGNOSIS — E21.3 HYPERPARATHYROIDISM: ICD-10-CM

## 2025-01-28 DIAGNOSIS — E66.812 CLASS 2 SEVERE OBESITY DUE TO EXCESS CALORIES WITH SERIOUS COMORBIDITY AND BODY MASS INDEX (BMI) OF 37.0 TO 37.9 IN ADULT: ICD-10-CM

## 2025-01-28 DIAGNOSIS — R73.03 PREDIABETES: Primary | ICD-10-CM

## 2025-01-28 DIAGNOSIS — E55.9 VITAMIN D DEFICIENCY: ICD-10-CM

## 2025-01-28 DIAGNOSIS — E66.01 CLASS 2 SEVERE OBESITY DUE TO EXCESS CALORIES WITH SERIOUS COMORBIDITY AND BODY MASS INDEX (BMI) OF 37.0 TO 37.9 IN ADULT: ICD-10-CM

## 2025-01-28 DIAGNOSIS — E78.49 ESSENTIAL FAMILIAL HYPERLIPIDEMIA: ICD-10-CM

## 2025-01-28 PROCEDURE — 3008F BODY MASS INDEX DOCD: CPT | Mod: CPTII,,, | Performed by: FAMILY MEDICINE

## 2025-01-28 PROCEDURE — 3077F SYST BP >= 140 MM HG: CPT | Mod: CPTII,,, | Performed by: FAMILY MEDICINE

## 2025-01-28 PROCEDURE — 1160F RVW MEDS BY RX/DR IN RCRD: CPT | Mod: CPTII,,, | Performed by: FAMILY MEDICINE

## 2025-01-28 PROCEDURE — 1126F AMNT PAIN NOTED NONE PRSNT: CPT | Mod: CPTII,,, | Performed by: FAMILY MEDICINE

## 2025-01-28 PROCEDURE — 1101F PT FALLS ASSESS-DOCD LE1/YR: CPT | Mod: CPTII,,, | Performed by: FAMILY MEDICINE

## 2025-01-28 PROCEDURE — 3288F FALL RISK ASSESSMENT DOCD: CPT | Mod: CPTII,,, | Performed by: FAMILY MEDICINE

## 2025-01-28 PROCEDURE — 3080F DIAST BP >= 90 MM HG: CPT | Mod: CPTII,,, | Performed by: FAMILY MEDICINE

## 2025-01-28 PROCEDURE — 3044F HG A1C LEVEL LT 7.0%: CPT | Mod: CPTII,,, | Performed by: FAMILY MEDICINE

## 2025-01-28 PROCEDURE — 1159F MED LIST DOCD IN RCRD: CPT | Mod: CPTII,,, | Performed by: FAMILY MEDICINE

## 2025-01-28 PROCEDURE — 99214 OFFICE O/P EST MOD 30 MIN: CPT | Mod: ,,, | Performed by: FAMILY MEDICINE

## 2025-01-28 RX ORDER — TIRZEPATIDE 15 MG/.5ML
15 INJECTION, SOLUTION SUBCUTANEOUS
Qty: 4 PEN | Refills: 11 | Status: SHIPPED | OUTPATIENT
Start: 2025-01-28 | End: 2025-01-28

## 2025-01-28 RX ORDER — TIRZEPATIDE 15 MG/.5ML
15 INJECTION, SOLUTION SUBCUTANEOUS
Qty: 4 PEN | Refills: 11 | Status: SHIPPED | OUTPATIENT
Start: 2025-01-28

## 2025-01-28 NOTE — PROGRESS NOTES
Patient ID: [id number]     Chief Complaint: Follow-up and Diabetes        HPI:     Wen Otoole is a 69 y.o. female here today for a follow up blisters and swelling around eyes, she reports that the blisters and swelling have resolved with treatment and with discontinuing Mupirocin.   - HTN/HLD is stable and asymptomatic with current Rx and followed by Cardiology (Dr. Vidal).  She needs orders for labs.    - She has pre-diabetes and she is obese, no weight changes since last visit, she has lost 50 pounds since starting Mounjaro, but would like lose more weight, she is doing well with Mounjaro, no side effects, she wants to stay at her current dose, she needs Rx refill today. She denies family history of medullary thyroid cancer, or MEN II, or personal history of pancreatitis. She is not interested in surgical weight loss or seeing a dietician.   - Hyperparathyroidism is asymptomatic, followed by Endocrinology (Dr. Duque).   - She has Vitamin D deficiency, controlled with Rx, no side effects, she needs Rx refill of Vitamin D.   - Patient is without any other complaints today.          -------------------------------------    Binge eating disorder    Generalized anxiety disorder    Hypertensive disorder    Insomnia    Obesity, unspecified    BENIGNO (obstructive sleep apnea)    Personal history of colonic polyps        Past Surgical History:   Procedure Laterality Date    BACK SURGERY      CHOLECYSTECTOMY      COLONOSCOPY  09/23/2020    KNEE ARTHROSCOPY      LUMBAR FUSION      NECK SURGERY         Review of patient's allergies indicates:  No Known Allergies    Outpatient Medications Marked as Taking for the 1/28/25 encounter (Office Visit) with Marianela Ulrich MD   Medication Sig Dispense Refill    acyclovir (ZOVIRAX) 400 MG tablet Take 1 tablet (400 mg total) by mouth 3 (three) times daily. for 7 days 21 tablet 0    atorvastatin (LIPITOR) 20 MG tablet TAKE ONE TABLET BY MOUTH EVERY DAY 90 tablet 3     busPIRone (BUSPAR) 15 MG tablet TAKE 1 TABLET(15 MG) BY MOUTH TWICE DAILY 60 tablet 2    cholecalciferol, vitamin D3, 1,250 mcg (50,000 unit) capsule TAKE ONE CAPSULE BY MOUTH ONCE WEEKLY 12 capsule 1    diltiaZEM (TIAZAC) 240 MG Cs24 Take 1 capsule (240 mg total) by mouth once daily. 90 capsule 3    levocetirizine (XYZAL) 5 MG tablet Take 1 tablet (5 mg total) by mouth every evening. 90 tablet 3    losartan-hydrochlorothiazide 100-25 mg (HYZAAR) 100-25 mg per tablet Take 0.5 tablets by mouth once daily. 90 tablet 3    meclizine (ANTIVERT) 25 mg tablet Take 1 tablet (25 mg total) by mouth 3 (three) times daily as needed for Dizziness (PRN dizziness). 30 tablet 0    methylPREDNISolone (MEDROL DOSEPACK) 4 mg tablet use as directed 21 each 0    phenazopyridine (PYRIDIUM) 200 MG tablet Take 1 tablet (200 mg total) by mouth 3 (three) times daily as needed for Pain. 6 tablet 0    suvorexant (BELSOMRA) 20 mg Tab Take 20 mg by mouth nightly as needed (insomnia). 30 tablet 5    valACYclovir (VALTREX) 1000 MG tablet TAKE 2 TABLETS(2000 MG) BY MOUTH EVERY 12 HOURS FOR 2 DOSES 4 tablet 1    [DISCONTINUED] MOUNJARO 15 mg/0.5 mL PnIj Inject 15 mg into the skin every 7 days. 4 Pen 11       Social History     Socioeconomic History    Marital status: Single   Tobacco Use    Smoking status: Former     Types: Cigarettes     Passive exposure: Past    Smokeless tobacco: Never   Substance and Sexual Activity    Alcohol use: Never    Drug use: Never    Sexual activity: Not Currently     Partners: Female     Birth control/protection: None     Social Drivers of Health     Financial Resource Strain: Low Risk  (9/23/2024)    Overall Financial Resource Strain (CARDIA)     Difficulty of Paying Living Expenses: Not very hard   Food Insecurity: No Food Insecurity (9/23/2024)    Hunger Vital Sign     Worried About Running Out of Food in the Last Year: Never true     Ran Out of Food in the Last Year: Never true   Transportation Needs: No  Transportation Needs (6/19/2024)    TRANSPORTATION NEEDS     Transportation : No   Physical Activity: Insufficiently Active (9/23/2024)    Exercise Vital Sign     Days of Exercise per Week: 2 days     Minutes of Exercise per Session: 60 min   Stress: No Stress Concern Present (9/23/2024)    [employer] - Occupational Stress Questionnaire     Feeling of Stress : Only a little   Housing Stability: Low Risk  (9/23/2024)    Housing Stability Vital Sign     Unable to Pay for Housing in the Last Year: No     Homeless in the Last Year: No        Family History   Problem Relation Name Age of Onset    Hypertension Mother Magnolia     Diabetes Mother Magnolia     Coronary artery disease Mother Magnolia     Coronary artery disease Brother          Subjective:       Review of Systems:    See HPI for details    Constitutional: Denies Change in appetite. Denies Chills. Denies Fever. Denies Night sweats.  Eye: Denies Blurred vision. Denies Discharge. Denies Eye pain.  ENT: Denies Decreased hearing. Denies Sore throat. Denies Swollen glands.  Respiratory: Denies Cough. Denies Shortness of breath. Denies Shortness of breath with exertion. Denies Wheezing.  Cardiovascular: Denies Chest pain at rest. Denies Chest pain with exertion. Denies Irregular heartbeat. Denies Palpitations.  Gastrointestinal: Denies Abdominal pain. Denies Diarrhea. Denies Nausea. Denies Vomiting. Denies Hematemesis or Hematochezia.  Genitourinary: Denies Dysuria. Denies Urinary frequency. Denies Urinary urgency. Denies Blood in urine.  Endocrine: Denies Cold intolerance. Denies Excessive thirst. Denies Heat intolerance. Denies Weight loss. Denies Weight gain.  Musculoskeletal: Denies Painful joints. Denies Weakness.  Integumentary: Denies Rash. Denies Itching. Denies Dry skin.  Neurologic: Denies Dizziness. Denies Fainting. Denies Headache.  Psychiatric: Denies Depression. Denies Anxiety. Denies Suicidal/Homicidal ideations.    All Other ROS:  "Negative except as stated in HPI.       Objective:     BP (!) 176/95 (BP Location: Left arm, Patient Position: Sitting)   Pulse 75   Temp 98.6 °F (37 °C) (Oral)   Resp 18   Ht 5' 1" (1.549 m)   Wt 75.3 kg (166 lb)   SpO2 95%   BMI 31.37 kg/m²     Physical Exam    General: Alert and oriented, No acute distress. Obese.  Head: Normocephalic, Atraumatic.  Eye: Pupils are equal, round and reactive to light, Extraocular movements are intact, Sclera non-icteric.  Ears/Nose/Throat: Normal, Mucosa moist,Clear.  Neck/Thyroid: Supple, Non-tender, No carotid bruit, No palpable thyromegaly or thyroid nodule, No lymphadenopathy, No JVD, Full range of motion.  Respiratory: Clear to auscultation bilaterally; No wheezes, rales or rhonchi,Non-labored respirations, Symmetrical chest wall expansion.  Cardiovascular: Regular rate and rhythm, S1/S2 normal, No murmurs, rubs or gallops.  Gastrointestinal: Soft, Non-tender, Non-distended, Normal bowel sounds, No palpable organomegaly.  Musculoskeletal: Normal range of motion.  Integumentary: Warm, Dry, Intact, No suspicious lesions or rashes.  Extremities: No clubbing, cyanosis or edema  Neurologic: No focal deficits, Cranial Nerves II-XII are grossly intact, Motor strength normal upper and lower extremities, Sensory exam intact.  Psychiatric: Normal interaction, Coherent speech, Euthymic mood, Appropriate affect         Assessment:       ICD-10-CM ICD-9-CM   1. Prediabetes  R73.03 790.29   2. Essential familial hyperlipidemia  E78.49 272.2   3. Vitamin D deficiency  E55.9 268.9   4. Hyperparathyroidism  E21.3 252.00   5. Class 2 severe obesity due to excess calories with serious comorbidity and body mass index (BMI) of 37.0 to 37.9 in adult  E66.812 278.01    E66.01 V85.37    Z68.37         Plan:     Problem List Items Addressed This Visit          Cardiac/Vascular    Essential familial hyperlipidemia    Relevant Orders    Lipid Panel    Comprehensive Metabolic Panel    CK       " Endocrine    Prediabetes - Primary    Relevant Medications    MOUNJARO 15 mg/0.5 mL PnIj    Hyperparathyroidism    Relevant Orders    Comprehensive Metabolic Panel    PTH, intact    Vitamin D deficiency    Relevant Orders    Vitamin D     Other Visit Diagnoses       Class 2 severe obesity due to excess calories with serious comorbidity and body mass index (BMI) of 37.0 to 37.9 in adult        Relevant Medications    MOUNJARO 15 mg/0.5 mL PnIj         1. Prediabetes  - MOUNJARO 15 mg/0.5 mL PnIj; Inject 15 mg into the skin every 7 days.  Dispense: 4 Pen; Refill: 11  - Diet, exercise, and 10% weight loss encouraged. Continue Rx Mounjaro to 15 mg weekly with close monitoring to help with pre-diabetes/insulin resistance and obesity. Will titrate Rx Mounjaro as needed/tolerated until max dose achieved. Recheck CMP, HgA1C in 07/2025. Notify M.D. or ER if symptoms persist or worsen, adverse Rx side effects, temp greater than 100.4, or any acute illness.     Lab Results   Component Value Date    HGBA1C 5.3 01/06/2025      Continue   Follow ADA Diet. Avoid soda, simple sweets, and limit rice/pasta/breads/starches.  Maintain healthy weight with goal BMI <30.  Exercise 5 times per week for 30 minutes per day.    2. Essential familial hyperlipidemia  - Lipid Panel; Future  - Comprehensive Metabolic Panel; Future  - CK; Future  - Continue Lipitor, will treat pending results.   Continue  Stressed importance of dietary modifications. Follow a low cholesterol, low saturated fat diet with less that 200mg of cholesterol a day.  Avoid fried foods and high saturated fats (high saturated fats less than 7% of calories).  Add Flax Seed/Fish Oil supplements to diet. Increase dietary fiber.  Regular exercise can reduce LDL and raise HDL. Stressed importance of physical activity 5 times per week for 30 minutes per day.      3. Vitamin D deficiency  - Vitamin D; Future, will treat pending results, get 15 minutes of sunlight daily wearing  sunscreen    4. Hyperparathyroidism  - Comprehensive Metabolic Panel; Future  - PTH, intact; Future  - Will treat pending results.     5. Class 2 severe obesity due to excess calories with serious comorbidity and body mass index (BMI) of 37.0 to 37.9 in adult  - MOUNJARO 15 mg/0.5 mL PnIj; Inject 15 mg into the skin every 7 days.  Dispense: 4 Pen; Refill: 11  - Same as #1.   Body mass index is 31.37 kg/m².  Goal BMI <30.  Exercise 5 times a week for 30 minutes per day.  Avoid soda, simple sugars, excessive rice, potatoes or bread. Limit fast foods and fried foods.  Choose complex carbs in moderation (example: green vegetables, beans, oatmeal). Eat plenty of fresh fruits and vegetables with lean meats daily.  Do not skip meals. Eat a balanced portion size.  Avoid fad diets. Consider permanent healthy life style changes.        Wen was seen today for follow-up and diabetes.    Diagnoses and all orders for this visit:    Prediabetes  -     Discontinue: MOUNJARO 15 mg/0.5 mL PnIj; Inject 15 mg into the skin every 7 days.  -     MOUNJARO 15 mg/0.5 mL PnIj; Inject 15 mg into the skin every 7 days.    Essential familial hyperlipidemia  -     Lipid Panel; Future  -     Comprehensive Metabolic Panel; Future  -     CK; Future    Vitamin D deficiency  -     Vitamin D; Future    Hyperparathyroidism  -     Comprehensive Metabolic Panel; Future  -     PTH, intact; Future    Class 2 severe obesity due to excess calories with serious comorbidity and body mass index (BMI) of 37.0 to 37.9 in adult  -     Discontinue: MOUNJARO 15 mg/0.5 mL PnIj; Inject 15 mg into the skin every 7 days.  -     MOUNJARO 15 mg/0.5 mL PnIj; Inject 15 mg into the skin every 7 days.          Medication List with Changes/Refills   Current Medications    ACYCLOVIR (ZOVIRAX) 400 MG TABLET    Take 1 tablet (400 mg total) by mouth 3 (three) times daily. for 7 days       Start Date: 1/3/2025  End Date: 1/28/2025    ATORVASTATIN (LIPITOR) 20 MG TABLET    TAKE  ONE TABLET BY MOUTH EVERY DAY       Start Date: 6/24/2024 End Date: --    BUSPIRONE (BUSPAR) 15 MG TABLET    TAKE 1 TABLET(15 MG) BY MOUTH TWICE DAILY       Start Date: 10/30/2024End Date: --    CHOLECALCIFEROL, VITAMIN D3, 1,250 MCG (50,000 UNIT) CAPSULE    TAKE ONE CAPSULE BY MOUTH ONCE WEEKLY       Start Date: 8/22/2024 End Date: --    DILTIAZEM (TIAZAC) 240 MG CS24    Take 1 capsule (240 mg total) by mouth once daily.       Start Date: 10/31/2023End Date: 1/28/2025    LEVOCETIRIZINE (XYZAL) 5 MG TABLET    Take 1 tablet (5 mg total) by mouth every evening.       Start Date: 9/17/2024 End Date: --    LOSARTAN-HYDROCHLOROTHIAZIDE 100-25 MG (HYZAAR) 100-25 MG PER TABLET    Take 0.5 tablets by mouth once daily.       Start Date: 9/26/2024 End Date: --    MECLIZINE (ANTIVERT) 25 MG TABLET    Take 1 tablet (25 mg total) by mouth 3 (three) times daily as needed for Dizziness (PRN dizziness).       Start Date: 7/15/2024 End Date: --    METHYLPREDNISOLONE (MEDROL DOSEPACK) 4 MG TABLET    use as directed       Start Date: 1/13/2025 End Date: 2/3/2025    PHENAZOPYRIDINE (PYRIDIUM) 200 MG TABLET    Take 1 tablet (200 mg total) by mouth 3 (three) times daily as needed for Pain.       Start Date: 3/4/2024  End Date: --    SUVOREXANT (BELSOMRA) 20 MG TAB    Take 20 mg by mouth nightly as needed (insomnia).       Start Date: 5/10/2023 End Date: --    VALACYCLOVIR (VALTREX) 1000 MG TABLET    TAKE 2 TABLETS(2000 MG) BY MOUTH EVERY 12 HOURS FOR 2 DOSES       Start Date: 9/16/2024 End Date: --   Changed and/or Refilled Medications    Modified Medication Previous Medication    MOUNJARO 15 MG/0.5 ML PNIJ MOUNJARO 15 mg/0.5 mL PnIj       Inject 15 mg into the skin every 7 days.    Inject 15 mg into the skin every 7 days.       Start Date: 1/28/2025 End Date: --    Start Date: 9/26/2024 End Date: 1/28/2025          Follow up in about 6 months (around 7/28/2025) for Wellness.

## 2025-01-28 NOTE — TELEPHONE ENCOUNTER
----- Message from Marianela Ulrich MD sent at 1/27/2025  5:50 PM CST -----  MRI MRCP shows dilatation of the common bile duct without evidence of a mass or stone, appears to be an effect from her gallbladder removal surgery.     She also has fatty deposits in her liver. In most cases, the fat buildup doesn't cause serious problems or prevent your liver from functioning normally and typically doesn't cause symptoms. There's no specific treatment or medications for fatty liver. Instead, we focus on helping you manage risk factors that contribute to the condition. This includes making lifestyle changes that can improve your health.  These include:  - Avoid alcohol: Steer clear of alcohol even if your SLD isn't related to alcohol use.  - Lose weight: Exercising, changing what you eat and drink (under the supervision of a nutritionist) and taking medications, such as GLP1RA, can help with weight loss. You may qualify for bariatric surgery, which can also help you lose weight. Follow a balanced diet to lose weight slowly but steadily. Healthcare providers often recommend steering clear of sugar and trying the Mediterranean diet, which is high in vegetables, fruits and good fats. Other foods and diets rich with nuts, seeds, whole grains, and fish and chicken are helpful food choices for fatty liver. It's also important to avoid eating too much red meat or drinking sugary beverages.  - Take medications to manage metabolic conditions: Take prescribed medicines to manage diabetes, cholesterol and triglycerides (fat in the blood). You may also need to take vitamin E and thiazolidinediones (drugs used to treat diabetes, such as Actos® and Avandia®) in specific instances.     - I placed a referral to Gastro specialist for evaluation and treatment.     -Remaining findings are normal.     - Thank you [email or web address] for your medical needs. Have a great day!    -Marianela Ulrich MD, FAAFP

## 2025-01-29 ENCOUNTER — TELEPHONE (OUTPATIENT)
Dept: FAMILY MEDICINE | Facility: CLINIC | Age: 70
End: 2025-01-29
Payer: MEDICARE

## 2025-01-29 ENCOUNTER — HOSPITAL ENCOUNTER (EMERGENCY)
Facility: HOSPITAL | Age: 70
Discharge: HOME | End: 2025-01-29
Attending: EMERGENCY MEDICINE
Payer: MEDICARE

## 2025-01-29 VITALS
OXYGEN SATURATION: 97 % | WEIGHT: 165 LBS | BODY MASS INDEX: 31.15 KG/M2 | DIASTOLIC BLOOD PRESSURE: 92 MMHG | RESPIRATION RATE: 18 BRPM | SYSTOLIC BLOOD PRESSURE: 184 MMHG | TEMPERATURE: 98 F | HEART RATE: 91 BPM | HEIGHT: 61 IN

## 2025-01-29 DIAGNOSIS — I10 UNCONTROLLED HYPERTENSION: Primary | ICD-10-CM

## 2025-01-29 PROCEDURE — 25000003 PHARM REV CODE 250: Performed by: NURSE PRACTITIONER

## 2025-01-29 PROCEDURE — 99283 EMERGENCY DEPT VISIT LOW MDM: CPT

## 2025-01-29 RX ORDER — CLONIDINE HYDROCHLORIDE 0.1 MG/1
0.1 TABLET ORAL 2 TIMES DAILY PRN
Qty: 25 TABLET | Refills: 0 | Status: SHIPPED | OUTPATIENT
Start: 2025-01-29

## 2025-01-29 RX ORDER — CLONIDINE HYDROCHLORIDE 0.1 MG/1
0.1 TABLET ORAL
Status: COMPLETED | OUTPATIENT
Start: 2025-01-29 | End: 2025-01-29

## 2025-01-29 RX ADMIN — CLONIDINE HYDROCHLORIDE 0.1 MG: 0.1 TABLET ORAL at 03:01

## 2025-01-29 NOTE — DISCHARGE INSTRUCTIONS
Monitor and decrease salt intake, DASH diet, drink more water. Try to exercise 2-3 times per week for 20-30 minutes.   Continue to take your blood pressure medication as prescribed. If your blood pressure if > 180/ > 90 then take the clonidine 0.1mg pill.   Continue to log your blood pressure but only take twice a day (morning and night).     Call Dr. Vidal office for follow up visit regarding blood pressure. If you develop dizziness, chest pain, headache then return to ER

## 2025-01-29 NOTE — ED PROVIDER NOTES
Encounter Date: 1/29/2025       History     Chief Complaint   Patient presents with    Hypertension     Complaining of HTN since yesterday; hx of htn; compliant with meds; denies other symptoms     See MDM    The history is provided by the patient. No  was used.     Review of patient's allergies indicates:  No Known Allergies  Past Medical History:   Diagnosis Date    Binge eating disorder     Generalized anxiety disorder     Hypertensive disorder     Insomnia     Obesity, unspecified     BENIGNO (obstructive sleep apnea)     Personal history of colonic polyps      Past Surgical History:   Procedure Laterality Date    BACK SURGERY      CHOLECYSTECTOMY      COLONOSCOPY  09/23/2020    KNEE ARTHROSCOPY      LUMBAR FUSION      NECK SURGERY       Family History   Problem Relation Name Age of Onset    Hypertension Mother Magnolia     Diabetes Mother Magnolia     Coronary artery disease Mother Magnolia     Coronary artery disease Brother       Social History     Tobacco Use    Smoking status: Former     Types: Cigarettes     Passive exposure: Past    Smokeless tobacco: Never   Substance Use Topics    Alcohol use: Never    Drug use: Never     Review of Systems   Constitutional:         Elevated blood pressure    All other systems reviewed and are negative.      Physical Exam     Initial Vitals [01/29/25 1327]   BP Pulse Resp Temp SpO2   (!) 161/87 90 18 97.9 °F (36.6 °C) 98 %      MAP       --         Physical Exam    Nursing note and vitals reviewed.  Constitutional: She appears well-developed and well-nourished.   Eyes: Conjunctivae are normal.   Cardiovascular:  Normal rate, regular rhythm and normal heart sounds.           Pulmonary/Chest: Breath sounds normal. No respiratory distress.     Neurological: She is alert and oriented to person, place, and time.   Skin: Skin is warm and dry.   Psychiatric: She has a normal mood and affect.         ED Course   Procedures  Labs Reviewed - No data to display       Imaging  Results    None          Medications   cloNIDine tablet 0.1 mg (0.1 mg Oral Given 1/29/25 8824)     Medical Decision Making  69-year-old female with a past medical history of hypertension on diltiazem, valsartan, hydrochlorothiazide presents with continued elevation of her blood pressure readings at home.  She denies any headache dizziness chest pain shortness a breath vision changes.  She was just seen yesterday by her primary care provider where her blood pressure was noted to be somewhat elevated as well.  She was told by the primary care provider to take her blood pressure at home.  It appears per her log she has been taking her blood pressure every 2 hours.  Today her blood pressure continued to be elevated she became nervous and came in.  Dr. Vidal is who manages her blood pressure and she thinks she has an appointment coming up but she has not quite sure.     Reviewed labs from the 6 which does not show any alarming findings.  She is asymptomatic currently.  We will give her a dose of clonidine here.  We discussed lifestyle modifications including the dash diet less than 2.4 g of sodium per day exercise.  We also discussed for her to call Dr. Quinn levels office and see when her next appointment is and if she can get in sooner for management of her uncontrolled blood pressure.  We also discussed sending her home with a p.r.n. clonidine for blood pressure that would be greater than 180/90.  ER precautions were given for any type of symptoms such as dizziness lightheadedness slurred speech chest pain for her to return to the emergency department.  We also discussed for her to manage her blood pressure she should only be taking her blood pressure twice a day and documenting that. She is comfortable with this plan.    Amount and/or Complexity of Data Reviewed  External Data Reviewed: labs and notes.     Details: Reviewed labs from 1/6/25 and office visit notes yesterday with pcp     Risk  Prescription drug  management.      Additional MDM:   Differential Diagnosis:   Other: The following diagnoses were also considered and will be evaluated: anxiety, uncontrolled hypertension and hypertensive emergency.                                   Clinical Impression:  Final diagnoses:  [I10] Uncontrolled hypertension (Primary)          ED Disposition Condition    Discharge Stable          ED Prescriptions       Medication Sig Dispense Start Date End Date Auth. Provider    cloNIDine (CATAPRES) 0.1 MG tablet Take 1 tablet (0.1 mg total) by mouth 2 (two) times daily as needed (if blood pressure > 180/ > 90). 25 tablet 1/29/2025 -- Alla Magaña FNP          Follow-up Information       Follow up With Specialties Details Why Contact Info    Marianela Ulrich MD Family Medicine   [id number] [facility]  [address] [place] [address] [id number] [phone]      Dr. Vidal  Call in 1 day               Alla Magaña FNP  01/29/25 1673

## 2025-02-03 ENCOUNTER — TELEPHONE (OUTPATIENT)
Dept: FAMILY MEDICINE | Facility: CLINIC | Age: 70
End: 2025-02-03
Payer: MEDICARE

## 2025-02-13 DIAGNOSIS — I10 HYPERTENSION, UNSPECIFIED TYPE: ICD-10-CM

## 2025-02-13 RX ORDER — DILTIAZEM HYDROCHLORIDE 240 MG/1
240 CAPSULE, EXTENDED RELEASE ORAL
Qty: 90 CAPSULE | Refills: 3 | Status: SHIPPED | OUTPATIENT
Start: 2025-02-13

## 2025-02-19 ENCOUNTER — RESULTS FOLLOW-UP (OUTPATIENT)
Dept: FAMILY MEDICINE | Facility: CLINIC | Age: 70
End: 2025-02-19
Payer: MEDICARE

## 2025-02-19 ENCOUNTER — LAB VISIT (OUTPATIENT)
Dept: LAB | Facility: HOSPITAL | Age: 70
End: 2025-02-19
Attending: INTERNAL MEDICINE
Payer: MEDICARE

## 2025-02-19 DIAGNOSIS — R76.8 HEPATITIS B ANTIBODY POSITIVE: ICD-10-CM

## 2025-02-19 DIAGNOSIS — E21.3 HYPERPARATHYROIDISM: ICD-10-CM

## 2025-02-19 DIAGNOSIS — E55.9 VITAMIN D DEFICIENCY: ICD-10-CM

## 2025-02-19 DIAGNOSIS — E78.49 ESSENTIAL FAMILIAL HYPERLIPIDEMIA: ICD-10-CM

## 2025-02-19 LAB
25(OH)D3+25(OH)D2 SERPL-MCNC: 48 NG/ML (ref 30–80)
ALBUMIN SERPL-MCNC: 4.4 G/DL (ref 3.4–4.8)
ALBUMIN/GLOB SERPL: 1.2 RATIO (ref 1.1–2)
ALP SERPL-CCNC: 95 UNIT/L (ref 40–150)
ALT SERPL-CCNC: 18 UNIT/L (ref 0–55)
ANION GAP SERPL CALC-SCNC: 10 MEQ/L
AST SERPL-CCNC: 20 UNIT/L (ref 5–34)
BILIRUB SERPL-MCNC: 0.9 MG/DL
BUN SERPL-MCNC: 14.3 MG/DL (ref 9.8–20.1)
CALCIUM SERPL-MCNC: 10.2 MG/DL (ref 8.4–10.2)
CHLORIDE SERPL-SCNC: 104 MMOL/L (ref 98–107)
CHOLEST SERPL-MCNC: 217 MG/DL
CHOLEST/HDLC SERPL: 2 {RATIO} (ref 0–5)
CK SERPL-CCNC: 137 U/L (ref 29–168)
CO2 SERPL-SCNC: 29 MMOL/L (ref 23–31)
CREAT SERPL-MCNC: 1.32 MG/DL (ref 0.55–1.02)
CREAT/UREA NIT SERPL: 11
GFR SERPLBLD CREATININE-BSD FMLA CKD-EPI: 44 ML/MIN/1.73/M2
GLOBULIN SER-MCNC: 3.8 GM/DL (ref 2.4–3.5)
GLUCOSE SERPL-MCNC: 93 MG/DL (ref 82–115)
HDLC SERPL-MCNC: 98 MG/DL (ref 35–60)
LDLC SERPL CALC-MCNC: 106 MG/DL (ref 50–140)
POTASSIUM SERPL-SCNC: 4 MMOL/L (ref 3.5–5.1)
PROT SERPL-MCNC: 8.2 GM/DL (ref 5.8–7.6)
PTH-INTACT SERPL-MCNC: 83 PG/ML (ref 8.7–77)
SODIUM SERPL-SCNC: 143 MMOL/L (ref 136–145)
TRIGL SERPL-MCNC: 67 MG/DL (ref 37–140)
VLDLC SERPL CALC-MCNC: 13 MG/DL

## 2025-02-19 PROCEDURE — 80061 LIPID PANEL: CPT

## 2025-02-19 PROCEDURE — 36415 COLL VENOUS BLD VENIPUNCTURE: CPT

## 2025-02-19 PROCEDURE — 82550 ASSAY OF CK (CPK): CPT

## 2025-02-19 PROCEDURE — 83970 ASSAY OF PARATHORMONE: CPT

## 2025-02-19 PROCEDURE — 82306 VITAMIN D 25 HYDROXY: CPT

## 2025-02-19 PROCEDURE — 80053 COMPREHEN METABOLIC PANEL: CPT

## 2025-02-19 PROCEDURE — 87517 HEPATITIS B DNA QUANT: CPT

## 2025-02-20 NOTE — TELEPHONE ENCOUNTER
----- Message from Marianela Ulrich MD sent at 2/19/2025  7:56 PM CST -----  - Intact PTH is elevated and suggestive of hyperparathyroidism, please forward results to her Endocrinologist (Dr. Duque) for review/treatment recommendations.     - Cholesterol is controlled, continue current cholesterol treatment plan, recheck CMP, FLP, CPK in 08/2025.     - Hep B testing is still pending.     - Remaining labs are stable from previous.     - Thank you for choosing [facility] for your medical needs. Have a great day!   -Marianela Ulrich MD, FAAFP    ----- Message -----  From: Lab, Background User  Sent: 2/19/2025   1:47 PM CST  To: Marianela Ulrich MD

## 2025-02-23 DIAGNOSIS — F41.1 GENERALIZED ANXIETY DISORDER: ICD-10-CM

## 2025-02-24 ENCOUNTER — PATIENT MESSAGE (OUTPATIENT)
Dept: FAMILY MEDICINE | Facility: CLINIC | Age: 70
End: 2025-02-24
Payer: MEDICARE

## 2025-02-24 LAB — HBV DNA SERPL NAA+PROBE-ACNC: NORMAL IU/ML

## 2025-02-24 RX ORDER — BUSPIRONE HYDROCHLORIDE 15 MG/1
15 TABLET ORAL 2 TIMES DAILY
Qty: 60 TABLET | Refills: 2 | Status: SHIPPED | OUTPATIENT
Start: 2025-02-24

## 2025-02-26 ENCOUNTER — DOCUMENTATION ONLY (OUTPATIENT)
Dept: FAMILY MEDICINE | Facility: CLINIC | Age: 70
End: 2025-02-26
Payer: MEDICARE

## 2025-03-06 DIAGNOSIS — E78.49 ESSENTIAL FAMILIAL HYPERLIPIDEMIA: ICD-10-CM

## 2025-03-06 RX ORDER — ATORVASTATIN CALCIUM 20 MG/1
20 TABLET, FILM COATED ORAL
Qty: 90 TABLET | Refills: 3 | Status: SHIPPED | OUTPATIENT
Start: 2025-03-06

## 2025-04-02 ENCOUNTER — LAB VISIT (OUTPATIENT)
Dept: LAB | Facility: HOSPITAL | Age: 70
End: 2025-04-02
Attending: INTERNAL MEDICINE
Payer: MEDICARE

## 2025-04-02 DIAGNOSIS — R94.5 NONSPECIFIC ABNORMAL RESULTS OF LIVER FUNCTION STUDY: Primary | ICD-10-CM

## 2025-04-02 DIAGNOSIS — K73.9 CHRONIC HEPATITIS: ICD-10-CM

## 2025-04-02 LAB
HAV AB SER QL IA: NONREACTIVE
HBV SURFACE AB SER-ACNC: ABNORMAL MIU/ML
HBV SURFACE AB SERPL IA-ACNC: REACTIVE M[IU]/ML
HBV SURFACE AG SERPL QL IA: NONREACTIVE

## 2025-04-02 PROCEDURE — 86705 HEP B CORE ANTIBODY IGM: CPT

## 2025-04-02 PROCEDURE — 86708 HEPATITIS A ANTIBODY: CPT

## 2025-04-02 PROCEDURE — 36415 COLL VENOUS BLD VENIPUNCTURE: CPT | Mod: 59

## 2025-04-02 PROCEDURE — 86707 HEPATITIS BE ANTIBODY: CPT

## 2025-04-02 PROCEDURE — 87340 HEPATITIS B SURFACE AG IA: CPT

## 2025-04-02 PROCEDURE — 87350 HEPATITIS BE AG IA: CPT

## 2025-04-02 PROCEDURE — 86706 HEP B SURFACE ANTIBODY: CPT

## 2025-04-04 ENCOUNTER — E-VISIT (OUTPATIENT)
Dept: FAMILY MEDICINE | Facility: CLINIC | Age: 70
End: 2025-04-04
Payer: MEDICARE

## 2025-04-04 ENCOUNTER — PATIENT MESSAGE (OUTPATIENT)
Dept: FAMILY MEDICINE | Facility: CLINIC | Age: 70
End: 2025-04-04

## 2025-04-04 ENCOUNTER — TELEPHONE (OUTPATIENT)
Dept: FAMILY MEDICINE | Facility: CLINIC | Age: 70
End: 2025-04-04
Payer: MEDICARE

## 2025-04-04 DIAGNOSIS — R73.03 PREDIABETES: Primary | ICD-10-CM

## 2025-04-04 LAB
HBV CORE IGM SERPL QL IA: NORMAL
HBV E AG SERPL QL IA: NEGATIVE
HBV E IGG SER QL IA: POSITIVE

## 2025-04-04 NOTE — TELEPHONE ENCOUNTER
An e-visit link has been sent to portal. I voiced to pt how to navigate the E-visit. Pt voiced thanks and understanding.

## 2025-04-04 NOTE — TELEPHONE ENCOUNTER
----- Message from Ozzy sent at 4/4/2025  8:21 AM CDT -----  Who Called: Wen Raeann is returning phone callWho Left Message for Patient:Does the patient know what this is regarding?:Preferred Method of Contact: Phone CallPatient's Preferred Phone Number on File: [phone] Best Call Back Number, if different:Additional Information: pt called to speak with nurse wants to come in soon for sore on face nothing on template soon pls advise

## 2025-04-09 RX ORDER — TIRZEPATIDE 12.5 MG/.5ML
12.5 INJECTION, SOLUTION SUBCUTANEOUS
Qty: 2 ML | Refills: 11 | Status: SHIPPED | OUTPATIENT
Start: 2025-04-09 | End: 2026-04-09

## 2025-04-09 NOTE — PROGRESS NOTES
Patient ID: Wen Otoole is a 69 y.o. female.    Chief Complaint: General Illness (Entered automatically based on patient selection in [employer].)    The patient initiated a request through [employer] on 4/4/2025 for evaluation and management with a chief complaint of General Illness (Entered automatically based on patient selection in [employer].)     I evaluated the questionnaire submission on 04/09/2025.    [facility]Medication    4/9/2025  4:57 PM CDT - Filed by Patient   What do you need help with? Medication Request   Do you agree to participate in an E-Visit? Yes   If you have any of the following symptoms, please present to your local emergency room or call 911:  I acknowledge   Medication requests for narcotics will not be addressed via an E-Visit.  Please schedule an appointment. I acknowledge   Do you want to address a new or existing medication? I would like to address a medication I currently take   What is the main issue you would like addressed today? I would like to change my Yakov prescription from 15 MG to 12.5 MG   Would you like to change or continue your medication? Change medication   What medication would you like changed?  Monjerro   What is your current dose? 15mg   How often do you take your medication? Less than 1 time weekly   Why do you need the medication changed? Side effects   List the side effects you had with the medication: Nausea   Have you stopped taking the medicine? Yes   Are you still having side effects? No    What medical condition is the  medication intended to treat? Weight loss   Are you able to take your vital signs? Yes   Systolic Blood Pressure:    Diastolic Blood Pressure:    Weight: 160   Height:    Pulse:    Temperature:    Respiration rate:    Pulse Oxygen:          Encounter Diagnosis   Name Primary?    Prediabetes Yes        No orders of the defined types were placed in this encounter.     Medications Ordered This Encounter   Medications     tirzepatide (MOUNJARO) 12.5 mg/0.5 mL PnIj     Sig: Inject 12.5 mg into the skin every 7 days.     Dispense:  2 mL     Refill:  11     Dose decrease. Thanks.      Diet, exercise, and 10% weight loss encouraged. I sent a prescription for a decreased dose of your Mounjaro to 12.5 mg weekly with close monitoring to help treat pre-diabetes and weight loss. Will titrate Rx Mounjaro as needed/tolerated until max dose achieved. Notify M.D. or ER if symptoms persist or worsen, adverse Rx side effects, pancreatitis, biliary colic, temp greater than 100.4, or any acute illness.     Lab Results   Component Value Date    HGBA1C 5.3 01/06/2025      Continue   Follow ADA Diet. Avoid soda, simple sweets, and limit rice/pasta/breads/starches.  There is no height or weight on file to calculate BMI.  Goal BMI <30.  Exercise 5 times a week for 30 minutes per day.  Avoid soda, simple sugars, excessive rice, potatoes or bread. Limit fast foods and fried foods.  Choose complex carbs in moderation (example: green vegetables, beans, oatmeal). Eat plenty of fresh fruits and vegetables with lean meats daily.  Do not skip meals. Eat a balanced portion size.  Avoid fad diets. Consider permanent healthy life style changes.      Follow up if symptoms worsen or fail to improve.      E-Visit Time Tracking:    Day 1 Time (in minutes): 5    Total Time (in minutes): 5

## 2025-04-10 LAB — MAYO GENERIC ORDERABLE RESULT: NORMAL

## 2025-04-28 ENCOUNTER — LAB VISIT (OUTPATIENT)
Dept: LAB | Facility: HOSPITAL | Age: 70
End: 2025-04-28
Attending: INTERNAL MEDICINE
Payer: MEDICARE

## 2025-04-28 DIAGNOSIS — R94.5 NONSPECIFIC ABNORMAL RESULTS OF LIVER FUNCTION STUDY: Primary | ICD-10-CM

## 2025-04-28 DIAGNOSIS — B18.1 HEPATITIS B CARRIER: ICD-10-CM

## 2025-04-28 LAB
HBV CORE AB SERPL QL IA: REACTIVE
HBV CORE IGM SERPL QL IA: REACTIVE
HBV SURFACE AB SER-ACNC: 9781.94 MIU/ML
HBV SURFACE AB SERPL IA-ACNC: REACTIVE M[IU]/ML
HBV SURFACE AG SERPL QL IA: NONREACTIVE

## 2025-04-28 PROCEDURE — 86706 HEP B SURFACE ANTIBODY: CPT

## 2025-04-28 PROCEDURE — 86705 HEP B CORE ANTIBODY IGM: CPT

## 2025-04-28 PROCEDURE — 86704 HEP B CORE ANTIBODY TOTAL: CPT

## 2025-04-28 PROCEDURE — 87340 HEPATITIS B SURFACE AG IA: CPT

## 2025-04-28 PROCEDURE — 87517 HEPATITIS B DNA QUANT: CPT

## 2025-04-28 PROCEDURE — 36415 COLL VENOUS BLD VENIPUNCTURE: CPT

## 2025-04-29 LAB — HBV DNA SERPL NAA+PROBE-ACNC: NOT DETECTED [IU]/ML

## 2025-05-12 ENCOUNTER — PATIENT MESSAGE (OUTPATIENT)
Facility: CLINIC | Age: 70
End: 2025-05-12
Payer: MEDICARE

## 2025-05-26 DIAGNOSIS — F41.1 GENERALIZED ANXIETY DISORDER: ICD-10-CM

## 2025-05-26 RX ORDER — BUSPIRONE HYDROCHLORIDE 15 MG/1
15 TABLET ORAL 2 TIMES DAILY
Qty: 60 TABLET | Refills: 2 | Status: SHIPPED | OUTPATIENT
Start: 2025-05-26

## 2025-05-28 DIAGNOSIS — E55.9 VITAMIN D DEFICIENCY: ICD-10-CM

## 2025-05-28 RX ORDER — ASPIRIN 325 MG
50000 TABLET, DELAYED RELEASE (ENTERIC COATED) ORAL
Qty: 12 CAPSULE | Refills: 1 | Status: SHIPPED | OUTPATIENT
Start: 2025-05-28

## 2025-06-17 ENCOUNTER — TELEPHONE (OUTPATIENT)
Dept: FAMILY MEDICINE | Facility: CLINIC | Age: 70
End: 2025-06-17
Payer: MEDICARE

## 2025-06-17 DIAGNOSIS — Z00.00 MEDICARE ANNUAL WELLNESS VISIT, SUBSEQUENT: Primary | ICD-10-CM

## 2025-06-18 ENCOUNTER — LAB VISIT (OUTPATIENT)
Dept: LAB | Facility: HOSPITAL | Age: 70
End: 2025-06-18
Attending: FAMILY MEDICINE
Payer: MEDICARE

## 2025-06-18 ENCOUNTER — RESULTS FOLLOW-UP (OUTPATIENT)
Dept: FAMILY MEDICINE | Facility: CLINIC | Age: 70
End: 2025-06-18

## 2025-06-18 DIAGNOSIS — Z00.00 MEDICARE ANNUAL WELLNESS VISIT, SUBSEQUENT: ICD-10-CM

## 2025-06-18 LAB
ALBUMIN SERPL-MCNC: 3.9 G/DL (ref 3.4–4.8)
ALBUMIN/GLOB SERPL: 1 RATIO (ref 1.1–2)
ALP SERPL-CCNC: 78 UNIT/L (ref 40–150)
ALT SERPL-CCNC: 24 UNIT/L (ref 0–55)
ANION GAP SERPL CALC-SCNC: 8 MEQ/L
AST SERPL-CCNC: 18 UNIT/L (ref 11–45)
BACTERIA #/AREA URNS AUTO: ABNORMAL /HPF
BASOPHILS # BLD AUTO: 0.03 X10(3)/MCL
BASOPHILS NFR BLD AUTO: 0.5 %
BILIRUB SERPL-MCNC: 0.7 MG/DL
BILIRUB UR QL STRIP.AUTO: NEGATIVE
BUN SERPL-MCNC: 19 MG/DL (ref 9.8–20.1)
CALCIUM SERPL-MCNC: 9.7 MG/DL (ref 8.4–10.2)
CHLORIDE SERPL-SCNC: 106 MMOL/L (ref 98–107)
CHOLEST SERPL-MCNC: 239 MG/DL
CHOLEST/HDLC SERPL: 2 {RATIO} (ref 0–5)
CK SERPL-CCNC: 89 U/L (ref 29–168)
CLARITY UR: ABNORMAL
CO2 SERPL-SCNC: 27 MMOL/L (ref 23–31)
COLOR UR AUTO: YELLOW
CREAT SERPL-MCNC: 1.02 MG/DL (ref 0.55–1.02)
CREAT/UREA NIT SERPL: 19
EOSINOPHIL # BLD AUTO: 0.22 X10(3)/MCL (ref 0–0.9)
EOSINOPHIL NFR BLD AUTO: 3.9 %
ERYTHROCYTE [DISTWIDTH] IN BLOOD BY AUTOMATED COUNT: 14.1 % (ref 11.5–17)
EST. AVERAGE GLUCOSE BLD GHB EST-MCNC: 105.4 MG/DL
GFR SERPLBLD CREATININE-BSD FMLA CKD-EPI: 59 ML/MIN/1.73/M2
GLOBULIN SER-MCNC: 4 GM/DL (ref 2.4–3.5)
GLUCOSE SERPL-MCNC: 76 MG/DL (ref 82–115)
GLUCOSE UR QL STRIP: NEGATIVE
HBA1C MFR BLD: 5.3 %
HCT VFR BLD AUTO: 45.2 % (ref 37–47)
HDLC SERPL-MCNC: 108 MG/DL (ref 35–60)
HGB BLD-MCNC: 14.9 G/DL (ref 12–16)
HGB UR QL STRIP: ABNORMAL
IMM GRANULOCYTES # BLD AUTO: 0.01 X10(3)/MCL (ref 0–0.04)
IMM GRANULOCYTES NFR BLD AUTO: 0.2 %
KETONES UR QL STRIP: NEGATIVE
LDLC SERPL CALC-MCNC: 119 MG/DL (ref 50–140)
LEUKOCYTE ESTERASE UR QL STRIP: NEGATIVE
LYMPHOCYTES # BLD AUTO: 2.64 X10(3)/MCL (ref 0.6–4.6)
LYMPHOCYTES NFR BLD AUTO: 46.5 %
MCH RBC QN AUTO: 30.3 PG (ref 27–31)
MCHC RBC AUTO-ENTMCNC: 33 G/DL (ref 33–36)
MCV RBC AUTO: 92.1 FL (ref 80–94)
MONOCYTES # BLD AUTO: 0.46 X10(3)/MCL (ref 0.1–1.3)
MONOCYTES NFR BLD AUTO: 8.1 %
NEUTROPHILS # BLD AUTO: 2.32 X10(3)/MCL (ref 2.1–9.2)
NEUTROPHILS NFR BLD AUTO: 40.8 %
NITRITE UR QL STRIP: NEGATIVE
NRBC BLD AUTO-RTO: 0 %
PH UR STRIP: 6 [PH]
PLATELET # BLD AUTO: 197 X10(3)/MCL (ref 130–400)
PMV BLD AUTO: 9.7 FL (ref 7.4–10.4)
POTASSIUM SERPL-SCNC: 4.1 MMOL/L (ref 3.5–5.1)
PROT SERPL-MCNC: 7.9 GM/DL (ref 5.8–7.6)
PROT UR QL STRIP: NEGATIVE
RBC # BLD AUTO: 4.91 X10(6)/MCL (ref 4.2–5.4)
RBC #/AREA URNS AUTO: ABNORMAL /HPF
SODIUM SERPL-SCNC: 141 MMOL/L (ref 136–145)
SP GR UR STRIP.AUTO: 1.02 (ref 1–1.03)
SQUAMOUS #/AREA URNS AUTO: ABNORMAL /HPF
TRIGL SERPL-MCNC: 59 MG/DL (ref 37–140)
TSH SERPL-ACNC: 3.19 UIU/ML (ref 0.35–4.94)
UROBILINOGEN UR STRIP-ACNC: 0.2
VLDLC SERPL CALC-MCNC: 12 MG/DL
WBC # BLD AUTO: 5.68 X10(3)/MCL (ref 4.5–11.5)
WBC #/AREA URNS AUTO: ABNORMAL /HPF

## 2025-06-18 PROCEDURE — 36415 COLL VENOUS BLD VENIPUNCTURE: CPT

## 2025-06-18 PROCEDURE — 81003 URINALYSIS AUTO W/O SCOPE: CPT

## 2025-06-18 PROCEDURE — 80061 LIPID PANEL: CPT

## 2025-06-18 PROCEDURE — 84443 ASSAY THYROID STIM HORMONE: CPT

## 2025-06-18 PROCEDURE — 83036 HEMOGLOBIN GLYCOSYLATED A1C: CPT

## 2025-06-18 PROCEDURE — 85025 COMPLETE CBC W/AUTO DIFF WBC: CPT

## 2025-06-18 PROCEDURE — 82550 ASSAY OF CK (CPK): CPT

## 2025-06-18 PROCEDURE — 80053 COMPREHEN METABOLIC PANEL: CPT

## 2025-06-19 ENCOUNTER — PATIENT MESSAGE (OUTPATIENT)
Dept: FAMILY MEDICINE | Facility: CLINIC | Age: 70
End: 2025-06-19
Payer: MEDICARE

## 2025-06-23 ENCOUNTER — OFFICE VISIT (OUTPATIENT)
Dept: FAMILY MEDICINE | Facility: CLINIC | Age: 70
End: 2025-06-23
Payer: MEDICARE

## 2025-06-23 ENCOUNTER — RESULTS FOLLOW-UP (OUTPATIENT)
Dept: FAMILY MEDICINE | Facility: CLINIC | Age: 70
End: 2025-06-23

## 2025-06-23 VITALS
BODY MASS INDEX: 29.83 KG/M2 | HEIGHT: 61 IN | DIASTOLIC BLOOD PRESSURE: 72 MMHG | WEIGHT: 158 LBS | OXYGEN SATURATION: 97 % | SYSTOLIC BLOOD PRESSURE: 122 MMHG | TEMPERATURE: 98 F | HEART RATE: 64 BPM | RESPIRATION RATE: 16 BRPM

## 2025-06-23 DIAGNOSIS — R31.21 ASYMPTOMATIC MICROSCOPIC HEMATURIA: ICD-10-CM

## 2025-06-23 DIAGNOSIS — Z00.00 MEDICARE ANNUAL WELLNESS VISIT, SUBSEQUENT: Primary | ICD-10-CM

## 2025-06-23 DIAGNOSIS — K76.0 FATTY LIVER: ICD-10-CM

## 2025-06-23 DIAGNOSIS — R82.71 BACTERIA IN URINE: ICD-10-CM

## 2025-06-23 DIAGNOSIS — B18.1 CHRONIC VIRAL HEPATITIS B WITHOUT DELTA AGENT AND WITHOUT COMA: ICD-10-CM

## 2025-06-23 DIAGNOSIS — E78.49 ESSENTIAL FAMILIAL HYPERLIPIDEMIA: ICD-10-CM

## 2025-06-23 DIAGNOSIS — N18.31 STAGE 3A CHRONIC KIDNEY DISEASE: ICD-10-CM

## 2025-06-23 DIAGNOSIS — Z12.11 SCREENING FOR COLON CANCER: ICD-10-CM

## 2025-06-23 DIAGNOSIS — L72.0 EPIDERMAL INCLUSION CYST: ICD-10-CM

## 2025-06-23 DIAGNOSIS — I10 HYPERTENSION, UNSPECIFIED TYPE: ICD-10-CM

## 2025-06-23 DIAGNOSIS — Z78.0 POSTMENOPAUSAL: ICD-10-CM

## 2025-06-23 DIAGNOSIS — F41.1 GENERALIZED ANXIETY DISORDER: ICD-10-CM

## 2025-06-23 DIAGNOSIS — Z13.820 SCREENING FOR OSTEOPOROSIS: ICD-10-CM

## 2025-06-23 DIAGNOSIS — R31.21 ASYMPTOMATIC MICROSCOPIC HEMATURIA: Primary | ICD-10-CM

## 2025-06-23 PROCEDURE — 3044F HG A1C LEVEL LT 7.0%: CPT | Mod: CPTII,,, | Performed by: FAMILY MEDICINE

## 2025-06-23 PROCEDURE — 3078F DIAST BP <80 MM HG: CPT | Mod: CPTII,,, | Performed by: FAMILY MEDICINE

## 2025-06-23 PROCEDURE — 1101F PT FALLS ASSESS-DOCD LE1/YR: CPT | Mod: CPTII,,, | Performed by: FAMILY MEDICINE

## 2025-06-23 PROCEDURE — 1159F MED LIST DOCD IN RCRD: CPT | Mod: CPTII,,, | Performed by: FAMILY MEDICINE

## 2025-06-23 PROCEDURE — 3288F FALL RISK ASSESSMENT DOCD: CPT | Mod: CPTII,,, | Performed by: FAMILY MEDICINE

## 2025-06-23 PROCEDURE — 87086 URINE CULTURE/COLONY COUNT: CPT | Performed by: FAMILY MEDICINE

## 2025-06-23 PROCEDURE — G0439 PPPS, SUBSEQ VISIT: HCPCS | Mod: ,,, | Performed by: FAMILY MEDICINE

## 2025-06-23 PROCEDURE — 3008F BODY MASS INDEX DOCD: CPT | Mod: CPTII,,, | Performed by: FAMILY MEDICINE

## 2025-06-23 PROCEDURE — 3074F SYST BP LT 130 MM HG: CPT | Mod: CPTII,,, | Performed by: FAMILY MEDICINE

## 2025-06-23 PROCEDURE — 99214 OFFICE O/P EST MOD 30 MIN: CPT | Mod: 25,,, | Performed by: FAMILY MEDICINE

## 2025-06-23 PROCEDURE — 1124F ACP DISCUSS-NO DSCNMKR DOCD: CPT | Mod: CPTII,,, | Performed by: FAMILY MEDICINE

## 2025-06-23 PROCEDURE — 1126F AMNT PAIN NOTED NONE PRSNT: CPT | Mod: CPTII,,, | Performed by: FAMILY MEDICINE

## 2025-06-23 PROCEDURE — 1160F RVW MEDS BY RX/DR IN RCRD: CPT | Mod: CPTII,,, | Performed by: FAMILY MEDICINE

## 2025-06-23 RX ORDER — BUSPIRONE HYDROCHLORIDE 30 MG/1
30 TABLET ORAL 2 TIMES DAILY
Qty: 60 TABLET | Refills: 11 | Status: SHIPPED | OUTPATIENT
Start: 2025-06-23 | End: 2026-06-23

## 2025-06-23 RX ORDER — TIZANIDINE HYDROCHLORIDE 4 MG/1
4 CAPSULE, GELATIN COATED ORAL 3 TIMES DAILY
COMMUNITY

## 2025-06-23 RX ORDER — ZOLPIDEM TARTRATE 10 MG/1
10 TABLET ORAL NIGHTLY PRN
COMMUNITY
Start: 2025-05-28

## 2025-06-23 RX ORDER — HYDROCODONE BITARTRATE AND ACETAMINOPHEN 10; 325 MG/1; MG/1
1 TABLET ORAL EVERY 12 HOURS PRN
COMMUNITY

## 2025-06-23 RX ORDER — MELOXICAM 7.5 MG/1
7.5 TABLET ORAL DAILY
COMMUNITY

## 2025-06-23 RX ORDER — DOXYCYCLINE 100 MG/1
100 CAPSULE ORAL 2 TIMES DAILY
COMMUNITY
Start: 2025-06-17

## 2025-06-23 NOTE — PROGRESS NOTES
Patient ID: [id number]     Chief Complaint: Medicare AWV        HPI:     Wen Otoole is a 69 y.o. female here today for a Medicare Wellness.   Well Adult History   The patient presents for well adult exam. The patient's general health status is described as good. The patient's diet is described as balanced. Exercise: occasional. Associated symptoms consist of denies weight loss, denies weight gain, denies fatigue, denies headache, denies hearing loss and denies vision changes. Last menstrual period: Postmenopausal, she is UTD on DEXA scan at [facility] in 2023, she needs ordered, followed by GYN. She is UTD on MMG in 03/2025 with GYN at [facility] or [facility], [facility]. Additional pertinent history: last dental exam: 03/2023, last eye exam: 01/2023 (wears contacts), last pap smear : 12/23/2021 (WN with GYN- Dr. Mckenzie), last Colonoscopy: 2020 with [facility], due for 5 year Colonscopy, GYN (Dr. Tariq), seat belt use, daily caffeine use (coffee), tobacco use none, quit 17 years ago, occasional alcohol use (wine) and She had labs done on 06/18/2025, here to discuss the results. She would not like STD screening. She refuses Prevnar 13, Pneumovax, Tdap, or Shingrix today, she will consider vaccines at this time. HTN/cholesterol is stable and asymptomatic with current Rx and followed by Cardiology (Dr. Vidal). Insomnia is controlled with Rx Ambien, no side effects. She has a history of fatty liver, asymptomatic, compliant with diet, asymptomatic.  She is no longer obese, lost 50 pounds with Rx Mounjaro, no side effects, she has Rx at home. She has tried diet and exercise without long term success. She has pre-diabetes, she has Rx at home. She denies family history of medullary thyroid cancer, or MEN II, or personal history of pancreatitis. She is not interested in surgical weight loss or seeing a dietician.   - She reports increased anxiety x several months. She is taking Buspirone 15mg x  bid with some improvement of symptoms, no side effects, would like to proceed with increased dose. She is not interested in outpatient counseling. She denies SI/HI, AH/VH, or depression.   - She complains of draining cystic lesion on right shoulder for years, currently draining yellow material. She denies fever, chills, erythema, or pain. She is not interested in surgical removal at this time.   - Patient is without any other complaints today.    denies Urinary leakage.  denies Recent falls or balance difficulty.   admits to Daily exercise or physical activity.  admits to Depression, stress, anxiety, or emotional lability.   denies The need for healthcare treatment including a cane/walker, blood pressure monitoring, or regular vision/hearing tests.     A separate E/M code has been provided to evaluate additional complaints that the patient would like addressed during the dedicated Medicare Wellness Exam.    Patient Care Team:  Marianela Ulrich MD as PCP - General (Family Medicine)  Cesar Tariq MD as Consulting Physician (Gastroenterology)  Eduard Fonseca [facility] -  Marianela Ulrich MD as Consulting Physician (Family Medicine)     Opioid Screening: Patient medication list reviewed, patient is taking prescription opioids, followed by pain management. Patient is not using additional opioids than prescribed. Patient is at low risk of substance abuse based on this opioid use history.      Advance Care Planning     Date: 06/23/2025  Patient did not wish or was not able to name a surrogate decision maker or provide an Advance Care Plan.        -------------------------------------    Binge eating disorder    Generalized anxiety disorder    Hypertensive disorder    Insomnia    Obesity, unspecified    BENIGNO (obstructive sleep apnea)    Personal history of colonic polyps        Past Surgical History:   Procedure Laterality Date    BACK SURGERY      CHOLECYSTECTOMY      COLONOSCOPY  09/23/2020     KNEE ARTHROSCOPY      LUMBAR FUSION      NECK SURGERY         Review of patient's allergies indicates:  No Known Allergies    Outpatient Medications Marked as Taking for the 6/23/25 encounter (Office Visit) with Marianela Ulrich MD   Medication Sig Dispense Refill    atorvastatin (LIPITOR) 20 MG tablet TAKE ONE TABLET BY MOUTH EVERY DAY 90 tablet 3    cholecalciferol, vitamin D3, 1,250 mcg (50,000 unit) capsule TAKE ONE CAPSULE BY MOUTH ONCE WEEKLY 12 capsule 1    cloNIDine (CATAPRES) 0.1 MG tablet Take 1 tablet (0.1 mg total) by mouth 2 (two) times daily as needed (if blood pressure > 180/ > 90). 25 tablet 0    diltiaZEM (TIAZAC) 240 MG Cs24 TAKE ONE CAPSULE BY MOUTH EVERY DAY 90 capsule 3    doxycycline (MONODOX) 100 MG capsule Take 100 mg by mouth 2 (two) times daily.      HYDROcodone-acetaminophen (NORCO)  mg per tablet Take 1 tablet by mouth every 12 (twelve) hours as needed for Pain.      levocetirizine (XYZAL) 5 MG tablet Take 1 tablet (5 mg total) by mouth every evening. 90 tablet 3    lidocaine/menthol (ZYLOTROL TOP) Apply 1 each topically Daily.      losartan-hydrochlorothiazide 100-25 mg (HYZAAR) 100-25 mg per tablet Take 0.5 tablets by mouth once daily. 90 tablet 3    meclizine (ANTIVERT) 25 mg tablet Take 1 tablet (25 mg total) by mouth 3 (three) times daily as needed for Dizziness (PRN dizziness). 30 tablet 0    meloxicam (MOBIC) 7.5 MG tablet Take 7.5 mg by mouth once daily.      phenazopyridine (PYRIDIUM) 200 MG tablet Take 1 tablet (200 mg total) by mouth 3 (three) times daily as needed for Pain. 6 tablet 0    tirzepatide (MOUNJARO) 12.5 mg/0.5 mL PnIj Inject 12.5 mg into the skin every 7 days. 2 mL 11    tiZANidine 4 mg Cap Take 4 mg by mouth 3 (three) times daily.      valACYclovir (VALTREX) 1000 MG tablet TAKE 2 TABLETS(2000 MG) BY MOUTH EVERY 12 HOURS FOR 2 DOSES 4 tablet 1    zolpidem (AMBIEN) 10 mg Tab Take 10 mg by mouth nightly as needed.      [DISCONTINUED] busPIRone (BUSPAR) 15  "MG tablet TAKE 1 TABLET(15 MG) BY MOUTH TWICE DAILY 60 tablet 2    [DISCONTINUED] suvorexant (BELSOMRA) 20 mg Tab Take 20 mg by mouth nightly as needed (insomnia). 30 tablet 5       Social History[1]     Family History   Problem Relation Name Age of Onset    Hypertension Mother Magnolia     Diabetes Mother Magnolia     Coronary artery disease Mother Magnolia     Coronary artery disease Brother          Subjective:     ROS      See HPI for details    Constitutional: Denies Change in appetite. Denies Chills. Denies Fever. Denies Night sweats.  Eye: Denies Blurred vision. Denies Discharge. Denies Eye pain.  ENT: Denies Decreased hearing. Denies Sore throat. Denies Swollen glands.  Respiratory: Denies Cough. Denies Shortness of breath. Denies Shortness of breath with exertion. Denies Wheezing.  Cardiovascular: Denies Chest pain at rest. Denies Chest pain with exertion. Denies Irregular heartbeat. Denies Palpitations.  Gastrointestinal: Denies Abdominal pain. Denies Diarrhea. Denies Nausea. Denies Vomiting. Denies Hematemesis or Hematochezia.  Genitourinary: Denies Dysuria. Denies Urinary frequency. Denies Urinary urgency. Denies Blood in urine.  Endocrine: Denies Cold intolerance. Denies Excessive thirst. Denies Heat intolerance. Denies Weight loss. Denies Weight gain.  Musculoskeletal: Denies Painful joints. Denies Weakness.  Integumentary: As per HPI. Denies Rash. Denies Itching. Denies Dry skin.  Neurologic: Denies Dizziness. Denies Fainting. Denies Headache.  Psychiatric: Denies Depression. Denies Anxiety. Denies Suicidal/Homicidal ideations.    All Other ROS: Negative except as stated in HPI.       Objective:     /72 (BP Location: Right arm, Patient Position: Sitting)   Pulse 64   Temp 98.1 °F (36.7 °C) (Oral)   Resp 16   Ht 5' 1" (1.549 m)   Wt 71.7 kg (158 lb)   SpO2 97%   BMI 29.85 kg/m²     Physical Exam    General: Alert and oriented, No acute distress.  Head: Normocephalic, Atraumatic.  Eye: Pupils are equal, " round and reactive to light, Extraocular movements are intact, Sclera non-icteric.  Ears/Nose/Throat: Normal, Mucosa moist,Clear.  Neck/Thyroid: Supple, Non-tender, No carotid bruit, No palpable thyromegaly or thyroid nodule, No lymphadenopathy, No JVD, Full range of motion.  Respiratory: Clear to auscultation bilaterally; No wheezes, rales or rhonchi,Non-labored respirations, Symmetrical chest wall expansion.  Cardiovascular: Regular rate and rhythm, S1/S2 normal, No murmurs, rubs or gallops.  Gastrointestinal: Soft, Non-tender, Non-distended, Normal bowel sounds, No palpable organomegaly.  Musculoskeletal: Normal range of motion.  Integumentary: Warm, Dry, Intact, No suspicious lesions or rashes. Right shoulder with suspected epidermal inclusion cyst with drainage of thin yellow material, NT, no erythema, no warmth, no fluctuance.   Extremities: No clubbing, cyanosis or edema  Neurologic: No focal deficits, Cranial Nerves II-XII are grossly intact, Motor strength normal upper and lower extremities, Sensory exam intact.  Psychiatric: Normal interaction, Coherent speech, Euthymic mood, Appropriate affect.     *Lab results from 06/18/2025 were reviewed and discussed with patient and patient voices understanding.*      Assessment:       ICD-10-CM ICD-9-CM   1. Medicare annual wellness visit, subsequent  Z00.00 V70.0   2. Screening for osteoporosis  Z13.820 V82.81   3. Postmenopausal  Z78.0 V49.81   4. Screening for colon cancer  Z12.11 V76.51   5. Generalized anxiety disorder  F41.1 300.02   6. Stage 3a chronic kidney disease  N18.31 585.3   7. Fatty liver  K76.0 571.8   8. Essential familial hyperlipidemia  E78.49 272.2   9. Hypertension, unspecified type  I10 401.9   10. Asymptomatic microscopic hematuria  R31.21 599.72   11. Chronic viral hepatitis B without delta agent and without coma  B18.1 070.32   12. Epidermal inclusion cyst  L72.0 706.2        Plan:       Medicare Annual Wellness and Personalized Prevention  Plan:     Fall Risk + Home Safety + Hearing Impairment + Depression Screen + Cognitive Impairment Screen + Health Risk Assessment all reviewed.          No data to display                  6/23/2025     1:09 PM 1/28/2025     2:03 PM 1/13/2025     3:28 PM 1/3/2025    10:31 AM 9/26/2024     1:52 PM 7/15/2024     9:35 AM 6/19/2024     1:25 PM   Depression Screeening PHQ2   Over the last two weeks how often have you been bothered by little interest or pleasure in doing things 0 0 0 0 0 0 0   Over the last two weeks how often have you been bothered by feeling down, depressed or hopeless 0 0 0 0 0 0 0   PHQ-2 Total Score 0 0 0 0 0 0 0         6/23/2025     1:00 PM 1/28/2025     1:45 PM 1/13/2025     3:15 PM 1/3/2025    10:30 AM 9/26/2024     1:45 PM 7/15/2024     9:30 AM 6/19/2024     1:00 PM   Fall Risk Assessment - Outpatient   Mobility Status Ambulatory Ambulatory Ambulatory Ambulatory Ambulatory Ambulatory Ambulatory   Number of falls 0 0 0 0 0 0 0   Identified as fall risk False False False False False False False                               Alcohol/Tobacco Use - Stressed importance of smoking cessation and limiting alcohol intake.  CVD Risk Factors - Reviewed  Obesity/Physical Activity - Normal BMI. Encouraged daily 30 minute physical activity x 5 days per week.      Health Maintenance Topics with due status: Not Due       Topic Last Completion Date    Hemoglobin A1c (Prediabetes) 06/18/2025    Lipid Panel 06/18/2025        Vaccinations -   Immunization History   Administered Date(s) Administered    COVID-19, MRNA, LN-S, PF (Pfizer) (Purple Cap) 01/20/2022    COVID-19, vector-nr, rS-Ad26, PF ([employer]) 03/05/2021          1. Medicare annual wellness visit, subsequent  - Monthly breast self exam encouraged. Diet, exercise, and 10% weight loss encouraged. Keep appointment for dental exams x q6 months as scheduled. Keep appointment for annual eye exam as scheduled. Keep appointment with specialists as scheduled.  Notify M.D. or ER if temp greater than 100.4, or any acute illness.      2. Screening for osteoporosis  - DXA Bone Density Axial Skeleton 1 or more sites; Future  - DXA Bone Density Axial Skeleton 1 or more sites    3. Postmenopausal  - DXA Bone Density Axial Skeleton 1 or more sites; Future  - DXA Bone Density Axial Skeleton 1 or more sites    4. Screening for colon cancer  - Ambulatory referral/consult to Gastroenterology; Future for 5 year Colonoscopy.    5. Generalized anxiety disorder, not controlled  - Rx trial of increased dose of busPIRone (BUSPAR) to 30 MG Tab; Take 1 tablet (30 mg total) by mouth 2 (two) times daily.  Dispense: 60 tablet; Refill: 11. Continue relaxation techniques. Will titrate medication as needed/tolerated. Notify M.D. or ER if symptoms persist or worsen, SI/HI, temp greater than 100.4, or any acute illness.    Start   /  Continue   Practice deep breathing or abdominal breathing exercises when anxiety occurs.  Exercise daily. Get sunlight daily.  Avoid caffeine, alcohol and stimulants.  Practice positive phrases and repeat throughout the day, yoga, lavender scents or Chamomile tea will help anxiety.  Set healthy boundaries, avoid people and conversations that increase stress.  Reports any symptoms of suicidal or homicidal ideations immediately, if clinic is closed go to nearest emergency room.     6. Stage 3a chronic kidney disease  - US Retroperitoneal Limited; Future  Stable from renal standpoint.  Continue  Follow renoprotective measures including Renal Diet (reduce intake of nuts, peanut butter, milk, cheese, dried beans, peas) and Low Sodium Diet (less than 2 grams per day).  Avoid NSAIDs (Aleve, Mobic, Celebrex, Ibuprofen, Advil, Toradol and Diclofenac). May take Tylenol as needed for headache/pain.  Control DM with goal A1C <7. BP goal <130/80. LDL goal < 100.  Stay well hydrated. Avoid alcohol and soda. Limit tea and coffee.  Smoking Cessation recommended.     7. Fatty liver  -  Continue followup with GI as scheduled; annual US Abdomen in 01/2026.   She also has fatty deposits in her liver. In most cases, the fat buildup doesn't cause serious problems or prevent your liver from functioning normally and typically doesn't cause symptoms. There's no specific treatment or medications for fatty liver. Instead, we focus on helping you manage risk factors that contribute to the condition. This includes making lifestyle changes that can improve your health.  These include:  - Avoid alcohol: Steer clear of alcohol even if your SLD isn't related to alcohol use.  - Lose weight: Exercising, changing what you eat and drink (under the supervision of a nutritionist) and taking medications, such as GLP1RA, can help with weight loss. You may qualify for bariatric surgery, which can also help you lose weight. Follow a balanced diet to lose weight slowly but steadily. Healthcare providers often recommend steering clear of sugar and trying the Mediterranean diet, which is high in vegetables, fruits and good fats. Other foods and diets rich with nuts, seeds, whole grains, and fish and chicken are helpful food choices for fatty liver. It's also important to avoid eating too much red meat or drinking sugary beverages.  - Take medications to manage metabolic conditions: Take prescribed medicines to manage diabetes, cholesterol and triglycerides (fat in the blood). You may also need to take vitamin E and thiazolidinediones (drugs used to treat diabetes, such as Actos® and Avandia®) in specific instances.     8. Essential familial hyperlipidemia  - Controlled, continue Lipitor as prescribed, recheck CMP, FLP, CPK in 06/2026.   Continue  Stressed importance of dietary modifications. Follow a low cholesterol, low saturated fat diet with less that 200mg of cholesterol a day.  Avoid fried foods and high saturated fats (high saturated fats less than 7% of calories).  Add Flax Seed/Fish Oil supplements to diet. Increase  dietary fiber.  Regular exercise can reduce LDL and raise HDL. Stressed importance of physical activity 5 times per week for 30 minutes per day.      9. Hypertension, unspecified type  - BP is well controlled. Keep daily BP log. Continue Clonidine, Tiazac as prescribed. Continue followup with Cardiology as scheduled. Notify M.D. or ER if BP >170/100 or <90/60, chest pain, palpitations, headache, SOB, temp greater than 100.4, or any acute illness.   Continue  Low Sodium Diet (DASH Diet - Less than 2 grams of sodium per day).  Monitor blood pressure daily and log. Report consistent numbers greater than 140/90.  Smoking cessation encouraged to aid in BP reduction.  Maintain healthy weight with goal BMI <30. Exercise 30 minutes per day, 5 days per week.      10. Asymptomatic microscopic hematuria  - Recheck Urinalysis, Reflex to Urine Culture Urine, Clean Catch; Future; if still present, will proceed with CT abdomen/pelvis without contrast to evaluate her kidneys and bladder.     11. Chronic viral hepatitis B without delta agent and without coma  - Asymptomatic, continue followup with GI as scheduled.     12. Epidermal inclusion cyst  - Wound care encouraged; Wound Culture; Future taken today to rule out bacterial skin infection; patient to consider General Surgery referral for excision. Notify M.D. or ER if symptoms persist or worsen, increased erythema, pain, bleeding, purulent drainage, temp >100.4, or any acute illness.          Medication List with Changes/Refills   New Medications    BUSPIRONE (BUSPAR) 30 MG TAB    Take 1 tablet (30 mg total) by mouth 2 (two) times daily.       Start Date: 6/23/2025 End Date: 6/23/2026   Current Medications    ACYCLOVIR (ZOVIRAX) 400 MG TABLET    Take 1 tablet (400 mg total) by mouth 3 (three) times daily. for 7 days       Start Date: 1/3/2025  End Date: 1/28/2025    ATORVASTATIN (LIPITOR) 20 MG TABLET    TAKE ONE TABLET BY MOUTH EVERY DAY       Start Date: 3/6/2025  End Date: --     CHOLECALCIFEROL, VITAMIN D3, 1,250 MCG (50,000 UNIT) CAPSULE    TAKE ONE CAPSULE BY MOUTH ONCE WEEKLY       Start Date: 5/28/2025 End Date: --    CLONIDINE (CATAPRES) 0.1 MG TABLET    Take 1 tablet (0.1 mg total) by mouth 2 (two) times daily as needed (if blood pressure > 180/ > 90).       Start Date: 1/29/2025 End Date: --    DILTIAZEM (TIAZAC) 240 MG CS24    TAKE ONE CAPSULE BY MOUTH EVERY DAY       Start Date: 2/13/2025 End Date: --    DOXYCYCLINE (MONODOX) 100 MG CAPSULE    Take 100 mg by mouth 2 (two) times daily.       Start Date: 6/17/2025 End Date: --    HYDROCODONE-ACETAMINOPHEN (NORCO)  MG PER TABLET    Take 1 tablet by mouth every 12 (twelve) hours as needed for Pain.       Start Date: --        End Date: --    LEVOCETIRIZINE (XYZAL) 5 MG TABLET    Take 1 tablet (5 mg total) by mouth every evening.       Start Date: 9/17/2024 End Date: --    LIDOCAINE/MENTHOL (ZYLOTROL TOP)    Apply 1 each topically Daily.       Start Date: --        End Date: --    LOSARTAN-HYDROCHLOROTHIAZIDE 100-25 MG (HYZAAR) 100-25 MG PER TABLET    Take 0.5 tablets by mouth once daily.       Start Date: 9/26/2024 End Date: --    MECLIZINE (ANTIVERT) 25 MG TABLET    Take 1 tablet (25 mg total) by mouth 3 (three) times daily as needed for Dizziness (PRN dizziness).       Start Date: 7/15/2024 End Date: --    MELOXICAM (MOBIC) 7.5 MG TABLET    Take 7.5 mg by mouth once daily.       Start Date: --        End Date: --    PHENAZOPYRIDINE (PYRIDIUM) 200 MG TABLET    Take 1 tablet (200 mg total) by mouth 3 (three) times daily as needed for Pain.       Start Date: 3/4/2024  End Date: --    TIRZEPATIDE (MOUNJARO) 12.5 MG/0.5 ML PNIJ    Inject 12.5 mg into the skin every 7 days.       Start Date: 4/9/2025  End Date: 4/9/2026    TIZANIDINE 4 MG CAP    Take 4 mg by mouth 3 (three) times daily.       Start Date: --        End Date: --    VALACYCLOVIR (VALTREX) 1000 MG TABLET    TAKE 2 TABLETS(2000 MG) BY MOUTH EVERY 12 HOURS FOR 2 DOSES        Start Date: 9/16/2024 End Date: --    ZOLPIDEM (AMBIEN) 10 MG TAB    Take 10 mg by mouth nightly as needed.       Start Date: 5/28/2025 End Date: --   Discontinued Medications    BUSPIRONE (BUSPAR) 15 MG TABLET    TAKE 1 TABLET(15 MG) BY MOUTH TWICE DAILY       Start Date: 5/26/2025 End Date: 6/23/2025    SUVOREXANT (BELSOMRA) 20 MG TAB    Take 20 mg by mouth nightly as needed (insomnia).       Start Date: 5/10/2023 End Date: 6/23/2025          The patient's Health Maintenance was reviewed and the following appears to be due at this time:   Health Maintenance Due   Topic Date Due    DEXA Scan  03/09/2025    Mammogram  03/12/2025    Colorectal Cancer Screening  09/23/2025         Follow up in about 3 months (around 9/23/2025) for Anxiety followup, Obesity Followup, In office Followup.            [1]   Social History  Socioeconomic History    Marital status: Single   Tobacco Use    Smoking status: Former     Types: Cigarettes     Passive exposure: Past    Smokeless tobacco: Never   Substance and Sexual Activity    Alcohol use: Never    Drug use: Never    Sexual activity: Not Currently     Partners: Female     Birth control/protection: None     Social Drivers of Health     Financial Resource Strain: Low Risk  (6/18/2025)    Overall Financial Resource Strain (CARDIA)     Difficulty of Paying Living Expenses: Not hard at all   Food Insecurity: No Food Insecurity (6/18/2025)    Hunger Vital Sign     Worried About Running Out of Food in the Last Year: Never true     Ran Out of Food in the Last Year: Never true   Transportation Needs: No Transportation Needs (6/18/2025)    PRAPARE - Transportation     Lack of Transportation (Medical): No     Lack of Transportation (Non-Medical): No   Physical Activity: Insufficiently Active (6/18/2025)    Exercise Vital Sign     Days of Exercise per Week: 3 days     Minutes of Exercise per Session: 30 min   Stress: Stress Concern Present (6/18/2025)    [employer] [place] [employer]  [employer]up[employer] - Occupational Stress Questionnaire     Feeling of Stress : To some extent   Housing Stability: Low Risk  (6/18/2025)    Housing Stability Vital Sign     Unable to Pay for Housing in the Last Year: No     Number of Times Moved in the Last Year: 0     Homeless in the Last Year: No

## 2025-06-24 ENCOUNTER — PATIENT MESSAGE (OUTPATIENT)
Dept: FAMILY MEDICINE | Facility: CLINIC | Age: 70
End: 2025-06-24
Payer: MEDICARE

## 2025-06-25 ENCOUNTER — PATIENT MESSAGE (OUTPATIENT)
Dept: FAMILY MEDICINE | Facility: CLINIC | Age: 70
End: 2025-06-25
Payer: MEDICARE

## 2025-06-26 LAB — BACTERIA UR CULT: NO GROWTH

## 2025-06-27 ENCOUNTER — PATIENT MESSAGE (OUTPATIENT)
Dept: FAMILY MEDICINE | Facility: CLINIC | Age: 70
End: 2025-06-27
Payer: MEDICARE

## 2025-06-27 ENCOUNTER — TELEPHONE (OUTPATIENT)
Dept: SURGERY | Facility: CLINIC | Age: 70
End: 2025-06-27
Payer: MEDICARE

## 2025-06-30 ENCOUNTER — PATIENT MESSAGE (OUTPATIENT)
Dept: FAMILY MEDICINE | Facility: CLINIC | Age: 70
End: 2025-06-30
Payer: MEDICARE

## 2025-07-03 ENCOUNTER — HOSPITAL ENCOUNTER (OUTPATIENT)
Dept: RADIOLOGY | Facility: HOSPITAL | Age: 70
Discharge: HOME | End: 2025-07-03
Attending: FAMILY MEDICINE
Payer: MEDICARE

## 2025-07-03 DIAGNOSIS — N18.31 STAGE 3A CHRONIC KIDNEY DISEASE: ICD-10-CM

## 2025-07-03 PROCEDURE — 76775 US EXAM ABDO BACK WALL LIM: CPT | Mod: TC

## 2025-07-07 DIAGNOSIS — R73.03 PREDIABETES: ICD-10-CM

## 2025-07-07 RX ORDER — TIRZEPATIDE 12.5 MG/.5ML
12.5 INJECTION, SOLUTION SUBCUTANEOUS
Qty: 2 ML | Refills: 11 | Status: SHIPPED | OUTPATIENT
Start: 2025-07-07 | End: 2026-07-07

## 2025-07-08 ENCOUNTER — PATIENT MESSAGE (OUTPATIENT)
Dept: FAMILY MEDICINE | Facility: CLINIC | Age: 70
End: 2025-07-08
Payer: MEDICARE

## 2025-07-08 NOTE — PROGRESS NOTES
"HISTORY & PHYSICAL  General Surgery    Patient Name: Wen Otoole  YOB: 1955    Date: 07/09/2025                     PRESENTING HISTORY     Chief Complaint/Reason for Admission: "I have a mass"    History of Present Illness:  Ms. Wen Otoole is a 69 y.o. female referred for evaluation of mass. female states female reports a mass on right shoulder  that has been present for quite some time, it has continued to increase in size and is now causing some discomfort. Denies trauma. female denies CP / SOB. Denies fever / chills.     Review of Systems:  12 point ROS negative except as stated in HPI    PAST HISTORY:     Past Medical History:   Diagnosis Date    Binge eating disorder     Epidermal cyst     Generalized anxiety disorder     Hypertensive disorder     Insomnia     Obesity, unspecified     BENIGNO (obstructive sleep apnea)     Personal history of colonic polyps        Past Surgical History:   Procedure Laterality Date    BACK SURGERY      CHOLECYSTECTOMY      COLONOSCOPY  09/23/2020    KNEE ARTHROSCOPY      LUMBAR FUSION      NECK SURGERY         Family History   Problem Relation Name Age of Onset    Hypertension Mother Magnolia     Diabetes Mother Magnolia     Coronary artery disease Mother Magnolia     Coronary artery disease Brother         Social History     Socioeconomic History    Marital status: Single   Tobacco Use    Smoking status: Former     Types: Cigarettes     Passive exposure: Past    Smokeless tobacco: Never   Substance and Sexual Activity    Alcohol use: Never    Drug use: Never    Sexual activity: Not Currently     Partners: Female     Birth control/protection: None     Social Drivers of Health     Financial Resource Strain: Low Risk  (6/18/2025)    Overall Financial Resource Strain (CARDIA)     Difficulty of Paying Living Expenses: Not hard at all   Food Insecurity: No Food Insecurity (6/18/2025)    Hunger Vital Sign     Worried About Running Out of Food in the Last Year: Never true     Ran " Out of Food in the Last Year: Never true   Transportation Needs: No Transportation Needs (6/18/2025)    PRAPARE - Transportation     Lack of Transportation (Medical): No     Lack of Transportation (Non-Medical): No   Physical Activity: Insufficiently Active (6/18/2025)    Exercise Vital Sign     Days of Exercise per Week: 3 days     Minutes of Exercise per Session: 30 min   Stress: Stress Concern Present (6/18/2025)    [employer] - Occupational Stress Questionnaire     Feeling of Stress : To some extent   Housing Stability: Low Risk  (6/18/2025)    Housing Stability Vital Sign     Unable to Pay for Housing in the Last Year: No     Number of Times Moved in the Last Year: 0     Homeless in the Last Year: No       MEDICATIONS & ALLERGIES:     Current Outpatient Medications on File Prior to Visit   Medication Sig    acyclovir (ZOVIRAX) 400 MG tablet Take 1 tablet (400 mg total) by mouth 3 (three) times daily. for 7 days    atorvastatin (LIPITOR) 20 MG tablet TAKE ONE TABLET BY MOUTH EVERY DAY    busPIRone (BUSPAR) 30 MG Tab Take 1 tablet (30 mg total) by mouth 2 (two) times daily.    cholecalciferol, vitamin D3, 1,250 mcg (50,000 unit) capsule TAKE ONE CAPSULE BY MOUTH ONCE WEEKLY    cloNIDine (CATAPRES) 0.1 MG tablet Take 1 tablet (0.1 mg total) by mouth 2 (two) times daily as needed (if blood pressure > 180/ > 90).    diltiaZEM (TIAZAC) 240 MG Cs24 TAKE ONE CAPSULE BY MOUTH EVERY DAY    doxycycline (MONODOX) 100 MG capsule Take 100 mg by mouth 2 (two) times daily.    HYDROcodone-acetaminophen (NORCO)  mg per tablet Take 1 tablet by mouth every 12 (twelve) hours as needed for Pain.    levocetirizine (XYZAL) 5 MG tablet Take 1 tablet (5 mg total) by mouth every evening.    lidocaine/menthol (ZYLOTROL TOP) Apply 1 each topically Daily.    losartan-hydrochlorothiazide 100-25 mg (HYZAAR) 100-25 mg per tablet Take 0.5 tablets by mouth once daily.    meclizine (ANTIVERT) 25 mg tablet Take  1 tablet (25 mg total) by mouth 3 (three) times daily as needed for Dizziness (PRN dizziness).    meloxicam (MOBIC) 7.5 MG tablet Take 7.5 mg by mouth once daily.    phenazopyridine (PYRIDIUM) 200 MG tablet Take 1 tablet (200 mg total) by mouth 3 (three) times daily as needed for Pain. (Patient not taking: Reported on 7/9/2025)    tirzepatide (MOUNJARO) 12.5 mg/0.5 mL PnIj Inject 12.5 mg into the skin every 7 days. (Patient taking differently: Inject 12.5 mg into the skin every 7 days. Mondays)    tiZANidine 4 mg Cap Take 4 mg by mouth 3 (three) times daily.    valACYclovir (VALTREX) 1000 MG tablet TAKE 2 TABLETS(2000 MG) BY MOUTH EVERY 12 HOURS FOR 2 DOSES    zolpidem (AMBIEN) 10 mg Tab Take 10 mg by mouth nightly as needed.     No current facility-administered medications on file prior to visit.       Review of patient's allergies indicates:  No Known Allergies    OBJECTIVE:     Vital Signs:  [unfilled]  Body mass index is 30.16 kg/m².     Physical Exam:  General:  Well developed, well nourished, no acute distress  HEENT:  Normocephalic, atraumatic, PERRL, EOMI, clear sclera, ears normal, neck supple, throat clear without erythema or exudates  CVS:  RRR, S1 and S2 normal, no murmurs, rubs, gallops  Resp:  Lungs clear to auscultation, no wheezes, rales, rhonchi, cough  GI:  Abdomen soft, non-tender, non-distended, normoactive bowel sounds, no masses  :  Deferred  MSK:  No muscle atrophy, cyanosis, peripheral edema, full range of motion  Skin:  No rashes, ulcers, erythema. R shoulder - 2 cm , raised, mobile, slightly keloid  Neuro:  CNII-XII grossly intact  Psych:  Alert and oriented to person, place, and time        ASSESSMENT & PLAN:   Ms. Wen Otoole is a 69 y.o. female with soft tissue mass, right shoulder 2cm     Plan:  - Excision of Soft Tissue Mass   - Preoperative workup

## 2025-07-09 ENCOUNTER — HOSPITAL ENCOUNTER (OUTPATIENT)
Dept: RADIOLOGY | Facility: HOSPITAL | Age: 70
Discharge: HOME | End: 2025-07-09
Attending: SURGERY
Payer: MEDICARE

## 2025-07-09 ENCOUNTER — TELEPHONE (OUTPATIENT)
Dept: SURGERY | Facility: CLINIC | Age: 70
End: 2025-07-09

## 2025-07-09 ENCOUNTER — OFFICE VISIT (OUTPATIENT)
Dept: SURGERY | Facility: CLINIC | Age: 70
End: 2025-07-09
Payer: MEDICARE

## 2025-07-09 VITALS
WEIGHT: 159.63 LBS | DIASTOLIC BLOOD PRESSURE: 79 MMHG | SYSTOLIC BLOOD PRESSURE: 147 MMHG | HEART RATE: 62 BPM | HEIGHT: 61 IN | BODY MASS INDEX: 30.14 KG/M2

## 2025-07-09 DIAGNOSIS — Z01.818 PREOP EXAMINATION: ICD-10-CM

## 2025-07-09 DIAGNOSIS — Z01.818 PREOP EXAMINATION: Primary | ICD-10-CM

## 2025-07-09 DIAGNOSIS — L72.0 EPIDERMAL INCLUSION CYST: ICD-10-CM

## 2025-07-09 DIAGNOSIS — M79.89 SOFT TISSUE MASS: ICD-10-CM

## 2025-07-09 PROCEDURE — 3077F SYST BP >= 140 MM HG: CPT | Mod: CPTII,,, | Performed by: SURGERY

## 2025-07-09 PROCEDURE — 1101F PT FALLS ASSESS-DOCD LE1/YR: CPT | Mod: CPTII,,, | Performed by: SURGERY

## 2025-07-09 PROCEDURE — 3078F DIAST BP <80 MM HG: CPT | Mod: CPTII,,, | Performed by: SURGERY

## 2025-07-09 PROCEDURE — 1160F RVW MEDS BY RX/DR IN RCRD: CPT | Mod: CPTII,,, | Performed by: SURGERY

## 2025-07-09 PROCEDURE — 99203 OFFICE O/P NEW LOW 30 MIN: CPT | Mod: ,,, | Performed by: SURGERY

## 2025-07-09 PROCEDURE — 1159F MED LIST DOCD IN RCRD: CPT | Mod: CPTII,,, | Performed by: SURGERY

## 2025-07-09 PROCEDURE — 71045 X-RAY EXAM CHEST 1 VIEW: CPT | Mod: TC

## 2025-07-09 PROCEDURE — 3044F HG A1C LEVEL LT 7.0%: CPT | Mod: CPTII,,, | Performed by: SURGERY

## 2025-07-09 PROCEDURE — 3288F FALL RISK ASSESSMENT DOCD: CPT | Mod: CPTII,,, | Performed by: SURGERY

## 2025-07-09 PROCEDURE — 3008F BODY MASS INDEX DOCD: CPT | Mod: CPTII,,, | Performed by: SURGERY

## 2025-07-09 NOTE — TELEPHONE ENCOUNTER
Spoke to patient during clinic appointment regarding holding Mounjaro for 14 days for upcoming procedure.  Verbalized understanding.

## 2025-07-10 ENCOUNTER — TELEPHONE (OUTPATIENT)
Dept: SURGERY | Facility: CLINIC | Age: 70
End: 2025-07-10
Payer: MEDICARE

## 2025-07-10 ENCOUNTER — PATIENT MESSAGE (OUTPATIENT)
Dept: FAMILY MEDICINE | Facility: CLINIC | Age: 70
End: 2025-07-10
Payer: MEDICARE

## 2025-07-10 DIAGNOSIS — M79.89 SOFT TISSUE MASS: Primary | ICD-10-CM

## 2025-07-23 DIAGNOSIS — H10.10 ALLERGIC CONJUNCTIVITIS, UNSPECIFIED LATERALITY: ICD-10-CM

## 2025-07-23 RX ORDER — LEVOCETIRIZINE DIHYDROCHLORIDE 5 MG/1
5 TABLET, FILM COATED ORAL NIGHTLY
Qty: 90 TABLET | Refills: 3 | Status: SHIPPED | OUTPATIENT
Start: 2025-07-23

## 2025-07-30 ENCOUNTER — DOCUMENTATION ONLY (OUTPATIENT)
Dept: FAMILY MEDICINE | Facility: CLINIC | Age: 70
End: 2025-07-30
Payer: MEDICARE

## 2025-07-30 LAB
CRC RECOMMENDATION EXT: NORMAL
EGD FOLLOW UP EXTERNAL: NORMAL

## 2025-08-05 ENCOUNTER — TELEPHONE (OUTPATIENT)
Dept: FAMILY MEDICINE | Facility: CLINIC | Age: 70
End: 2025-08-05
Payer: MEDICARE

## 2025-08-05 NOTE — TELEPHONE ENCOUNTER
Copied from CRM #[id number]. Topic: Medications - Medication Question  >> Aug 5, 2025  9:01 AM Pau wrote:  .Who Called: Wen Otoole    Caller is requesting assistance/information from provider's office.    Symptoms (please be specific): N/A   How long has patient had these symptoms:  N/A  List of preferred pharmacies on file (remove unneeded): [unfilled]  If different, enter pharmacy into here including location and phone number: N/A      Preferred Method of Contact: Phone Call  Patient's Preferred Phone Number on File: [phone]     Best Call Back Number, if different:    Additional Information: Pt wants to know if she would be qualify for medication ingrezzia that she seen on TV that relaxes the body. Please advise, thank you

## 2025-08-06 ENCOUNTER — TELEPHONE (OUTPATIENT)
Dept: FAMILY MEDICINE | Facility: CLINIC | Age: 70
End: 2025-08-06
Payer: MEDICARE

## 2025-08-06 DIAGNOSIS — R25.1 TREMOR OF BOTH HANDS: Primary | ICD-10-CM

## 2025-08-06 NOTE — TELEPHONE ENCOUNTER
Good morning patient does need a referral to a neurologist. She states that her main concern is the hand shakes/ trembles, please advise thanks !

## 2025-08-06 NOTE — TELEPHONE ENCOUNTER
Copied from CRM #[id number]. Topic: General Inquiry - Patient Advice  >> Aug 6, 2025 11:06 AM Alina wrote:  .Who Called: Wen Otoole    Does the patient already have the specialty appointment scheduled?:no  If yes, what is the date of that appointment?:na  Referral to What Specialty:na  Reason for Referral:na  Does the patient want the referral with a specific physician?:no  If yes, which provider?:   [facility] the [facility]    Preferred Method of Contact: Phone Call  Patient's Preferred Phone Number on File: [phone]   Best Call Back Number, if different:  Additional Information: p[t wants nurse to give her a call regarding referral. Referral needs to be sent to Neurologist- Dr. Jere Farias-[phone]